# Patient Record
Sex: FEMALE | Race: WHITE | Employment: OTHER | URBAN - METROPOLITAN AREA
[De-identification: names, ages, dates, MRNs, and addresses within clinical notes are randomized per-mention and may not be internally consistent; named-entity substitution may affect disease eponyms.]

---

## 2018-02-09 ENCOUNTER — OFFICE VISIT (OUTPATIENT)
Dept: NEUROLOGY | Age: 78
End: 2018-02-09

## 2018-02-09 VITALS — BODY MASS INDEX: 28.49 KG/M2 | HEIGHT: 65 IN | RESPIRATION RATE: 20 BRPM | WEIGHT: 171 LBS

## 2018-02-09 DIAGNOSIS — R44.3 HALLUCINATIONS: ICD-10-CM

## 2018-02-09 DIAGNOSIS — F03.91 DEMENTIA WITH BEHAVIORAL DISTURBANCE, UNSPECIFIED DEMENTIA TYPE: Primary | ICD-10-CM

## 2018-02-09 RX ORDER — NITROGLYCERIN 80 MG/1
PATCH TRANSDERMAL
Refills: 0 | COMMUNITY
Start: 2018-02-06 | End: 2021-08-26

## 2018-02-09 RX ORDER — RANITIDINE 150 MG/1
TABLET, FILM COATED ORAL
Refills: 0 | COMMUNITY
Start: 2018-01-22 | End: 2021-08-26

## 2018-02-09 RX ORDER — CETIRIZINE HCL 10 MG
10 TABLET ORAL DAILY
COMMUNITY
End: 2021-09-11

## 2018-02-09 RX ORDER — QUETIAPINE FUMARATE 25 MG/1
TABLET, FILM COATED ORAL
Refills: 0 | COMMUNITY
Start: 2018-01-12 | End: 2018-02-09 | Stop reason: SDUPTHER

## 2018-02-09 RX ORDER — LEVOTHYROXINE SODIUM 50 UG/1
50 TABLET ORAL
Refills: 0 | COMMUNITY
Start: 2018-01-12

## 2018-02-09 RX ORDER — MEMANTINE HYDROCHLORIDE 10 MG/1
10 TABLET ORAL DAILY
Qty: 60 TAB | Refills: 5 | Status: SHIPPED | OUTPATIENT
Start: 2018-02-09 | End: 2021-08-26

## 2018-02-09 RX ORDER — GABAPENTIN 300 MG/1
CAPSULE ORAL
Refills: 0 | COMMUNITY
Start: 2018-01-20 | End: 2021-08-26

## 2018-02-09 RX ORDER — ERGOCALCIFEROL 1.25 MG/1
CAPSULE ORAL
COMMUNITY
End: 2021-08-26

## 2018-02-09 RX ORDER — LOSARTAN POTASSIUM 25 MG/1
TABLET ORAL
Refills: 0 | COMMUNITY
Start: 2017-12-30 | End: 2021-08-26

## 2018-02-09 RX ORDER — SODIUM BICARBONATE 325 MG/1
325 TABLET ORAL 2 TIMES DAILY
Refills: 0 | COMMUNITY
Start: 2018-01-20

## 2018-02-09 RX ORDER — QUETIAPINE FUMARATE 25 MG/1
50 TABLET, FILM COATED ORAL 2 TIMES DAILY
Qty: 120 TAB | Refills: 3 | Status: SHIPPED | OUTPATIENT
Start: 2018-02-09 | End: 2018-08-06 | Stop reason: SDUPTHER

## 2018-02-09 RX ORDER — ALPRAZOLAM 0.5 MG/1
TABLET ORAL
Refills: 0 | COMMUNITY
Start: 2018-01-16 | End: 2021-08-26

## 2018-02-09 NOTE — LETTER
Dear René Johnston MD, Thank you for allowing me to see your patient, Ania Rodriguez for a neurological consultation. Please see my impression and recommendations as outlined in my note. Sincerely, Wing Idania MD 
[facility] at [address] 
 
REFERRED BY: 
René Johnston MD 
 
CHIEF COMPLAINT: 
Dementia with hallucinations HISTORY OF PRESENT ILLNESS HISTORY PROVIDED BY: 
Patient Family Member: niece (pt lives with her) Tushar Peguero is a 65 y.o. female who I am asked to see in consultation for dementia with hallucinations. She has had memory issues since 2012 after she had a cardiac arrest. 
She lived in [facility] until recently. She has lived with family here for about 3 years ago. She did have an apartment on her own for some of the time but had family closely checking on her. She started to have issues with loud neighbors and the patient feeling like the neighbors were doing drugs. (niece reports this was not the caseparanoia on patient's part) Her long term memory is good. Her legs will get weak and her balance will be off and she will walk like she is drunk. This is not a constant event. She has no tremor. In the summer she had shingles and had to be hospitalized. She subsequently was home and was having hallucinations of seeing a man that wasn't there. This seemed to resolve, but then she had issues last night. Last night she was seeing a man on the couch when she got up to go the bathroom. This was obviously concerning to the patient. The patient does have a low-dose of Seroquel to take at night, but it does not seem to be helping. She had a significant cardiac event in 2012. She was on a ventilator during this time. She had a pacemaker placed. She went on HD. She is not on dialysis now. She has had memory issues since that time.   
Pt does confabulate details of her past.  
 She has never had memory medications or neuropsych. She had an episode 2 months ago with facial droop on the right that resolved. Family does cooking, cleaning, finances, and med management. She does her own ADLs. Patient is on Plavix due to her cardiac history Patient is taking gabapentin for neuropathy. [facility] Past Medical History:  
Diagnosis Date  Diabetes ([address]  Diverticulitis  Hypertension  Renal failure [address] Social History Social History  Marital status:  Spouse name: N/A  
 Number of children: N/A  
 Years of education: N/A Social History Main Topics  Smoking status: Never Smoker  Smokeless tobacco: Not on file  Alcohol use No  
 Drug use: Not on file  Sexual activity: Not on file Other Topics Concern  Not on file Social History Narrative  No narrative on file Keaton Wylie No significant family history ALLERGIES No Known Allergies CURRENT MEDS Current Outpatient Prescriptions Medication Sig Dispense Refill  gabapentin (NEURONTIN) 300 mg capsule Take one capsule by mouth in the morning and 2 capsules at night  0  
 levothyroxine (SYNTHROID) 50 mcg tablet Take one tablet by mouth in the am  0  
 losartan (COZAAR) 25 mg tablet take 1/2 tablet by mouth once daily  0  
 QUEtiapine (SEROQUEL) 25 mg tablet Take one tablet by mouth at HS  0  
 raNITIdine (ZANTAC) 150 mg tablet take 1 tablet by mouth at bedtime  0  
 sodium bicarbonate 325 mg tablet Take one tablet by mouth three times a day  0  
 ALPRAZolam (XANAX) 0.5 mg tablet take 1 tablet by mouth at bedtime if needed for 30 DAYS  0  
 BD INSULIN PEN NEEDLE UF SHORT 31 X 5/16 \" ndle   0  
 montelukast (SINGULAIR) 10 mg tablet   1  
 HUMALOG KWIKPEN 100 unit/mL kwikpen   4  
 LANTUS SOLOSTAR 100 unit/mL (3 mL) pen   4  
 sertraline (ZOLOFT) 50 mg tablet   0  
 spironolactone (ALDACTONE) 25 mg tablet   0  
 NOVOLOG FLEXPEN 100 unit/mL flexpen   0  
  clopidogrel (PLAVIX) 75 mg tablet   0  
 carvedilol (COREG) 12.5 mg tablet   0  
 amLODIPine (NORVASC) 2.5 mg tablet   0  
 aspirin 81 mg chewable tablet   0  
 atorvastatin (LIPITOR) 20 mg tablet   0  
 ONETOUCH ULTRA TEST strip   5  
 nitroglycerin (NITRODUR) 0.4 mg/hr apply 1 patch TO SKIN. REMOVE AFTER 24 HOURS ONCE A DAY  0  
 ofloxacin (FLOXIN) 0.3 % ophthalmic solution Administer 3 Drops to right eye four (4) times daily.  predniSONE (DELTASONE) 10 mg tablet Take 10 mg by mouth two (2) times a day.  cefUROXime (CEFTIN) 500 mg tablet Take 500 mg by mouth two (2) times a day.  NITROSTAT 0.4 mg SL tablet   0  
 hydrALAZINE (APRESOLINE) 50 mg tablet   0  
 fluticasone (FLONASE) 50 mcg/actuation nasal spray   0  
 amiodarone (CORDARONE) 200 mg tablet   0 REVIEW OF SYSTEMS:  
 
Y  N       Y  N  Y  N   Y  N 
  AIDS            Falls    Memory Loss     Shortness of breath Anxiety            Fatigue   Muscle Pain           Skipped beats Chest Pain     Frequent HA   Ms Weakness        Snoring Constipation  Hearing loss   Nause/Vomiting     Stomach Pain Cough           Hepatitis   Neuropathy            Swallowing difficulty Depression   Incontinence   Poor appetite         Vertigo Diarrhea         Joint Pain   Rash                      Visual disturbances Fainting          Leg Swelling   Ringing ears          Weight changes Unable to obtain  ROS due to  mental status change  sedated   intubated PREVIOUS WORKUP IMAGING: none LABS Results for orders placed or performed during the hospital encounter of 12/20/15 PROTHROMBIN TIME + INR Result Value Ref Range INR 1.1 0.9 - 1.1 Prothrombin time 10.4 9.0 - 11.1 sec PTT Result Value Ref Range aPTT 29.8 22.1 - 32.5 sec  
 aPTT, therapeutic range     58.0 - 77.0 SECS  
 
 
PHYSICAL EXAM 
Visit Vitals  Resp 20  
 Ht 5' 5\" (1.651 m)  Wt 77.6 kg (171 lb)  BMI 28.46 kg/m2 General:  Alert, cooperative, no distress. Head:  Normocephalic, without obvious abnormality, atraumatic. Eyes:  Conjunctivae/corneas clear. Pupils equal, round, reactive to light. Extraocular movements intact, VFF, NO papilledema Lungs: 
Heart:   Non labored breathing Regular rate and rhythm, no carotid bruits Abdomen:   Soft, non-distended Extremities: Extremities normal, atraumatic, no cyanosis or edema. Pulses: 2+ and symmetric all extremities. Skin: Skin color, texture, turgor normal. No rashes or lesions. Neurologic:  Gen: Attention normal 
           Language: naming, repetition, fluency normal 
           Memory: A&Ox3, 3/3 word recall Cranial Nerves: 
I: smell Not tested II: visual fields Full to confrontation II: pupils Equal, round, reactive to light II: optic disc No papilledema III,VII: ptosis none III,IV,VI: extraocular muscles  Full ROM V: mastication normal  
V: facial light touch sensation  normal  
VII: facial muscle function   symmetric VIII: hearing symmetric IX: soft palate elevation  normal  
XI: trapezius strength  5/5 XI: sternocleidomastoid strength 5/5 XI: neck flexion strength  5/5 XII: tongue  midline Motor: normal bulk and tone, no tremor Strength: moves all four extremities equally Sensory: intact to LT, PP, vibration, and temperature Coordination: FTN intact Gait: wide based Reflexes: 1+ throughout IMPRESSION Aydin Marie is a 65 y.o. female who presents for evaluation of dementia and hallucinations. She has never been formally evaluated for her dementia and is not having any medication for this. Additionally hallucinations are likely secondary to her dementia. She has had issues with vivid dreams so will not do Aricept. Will start Namenda. Will have to monitor closely to ensure hallucinations do not progress. We will also adjust Seroquel dosing to see if this will help patient with her hallucinations. RECOMMENDATIONS 1. CT head 2. Reversible memory labs completed by PCP and negative 3. We will check EEG 4.  Start Namenda 5 mg twice daily and titrate to 10 mg twice daily. Side effects discussed. Information given to family 5. We will hold off on Aricept due to history of vivid dreams 6. Will increase Seroquel up to 50 mg twice daily. Schedule given to family. Side effects discussed. 7.  Will do referral for neuropsychological testing. Family would like evaluation and help with safety factors, etc. 
 
FU 3 months Manuel Espinal MD 
 
CC: Serg Cuellar MD 
Fax: [phone] This note was created using voice recognition software. Despite editing, there may be syntax errors. This note will not be viewable in [address] Reviewed record in preparation for visit and have necessary documentation Pt did not bring medication to office visit for review Medication list reviewed and reconciled with patient Information was given to pt on Advanced Directives, Living Will 
opportunity was given for questions

## 2018-02-09 NOTE — MR AVS SNAPSHOT
[facility] 
 
 
 [place] [address] [address] [phone] Patient: Aisha Sanders MRN: [id number] :1940 Visit Information Date & Time Provider Department Dept. Phone Encounter #  
 2018  1:00 PM Doe Goode MD [facility] [phone] [id number] Your Appointments 2018  1:40 PM  
Follow Up with Doe Goode MD  
[facility]) Appt Note: memory loss [place]  [address] [address] [phone]  
  
   
 [place]  [address] [address] [address] Upcoming Health Maintenance Date Due DTaP/Tdap/Td series (1 - Tdap) 10/31/1960 ZOSTER VACCINE AGE 60> 1999 GLAUCOMA SCREENING Q2Y 10/31/2004 OSTEOPOROSIS SCREENING (DEXA) 10/31/2004 Pneumococcal 65+ Low/Medium Risk (1 of 2 - PCV13) 10/31/2004 MEDICARE YEARLY EXAM 10/31/2004 Influenza Age 5 to Adult 2017 Allergies as of 2018  Review Complete On: 2015 By: Inessa Read RN No Known Allergies Current Immunizations  Never Reviewed No immunizations on file. Not reviewed this visit You Were Diagnosed With   
  
 Codes Comments Dementia with behavioral disturbance, unspecified dementia type    -  Primary ICD-10-CM: F03.91 
ICD-9-CM: 294.21 Hallucinations     ICD-10-CM: R44.3 ICD-9-CM: 780.1 Vitals Resp Height(growth percentile) Weight(growth percentile) BMI OB Status Smoking Status 20 5' 5\" (1.651 m) 171 lb (77.6 kg) 28.46 kg/m2 Hysterectomy Never Smoker BMI and BSA Data Body Mass Index Body Surface Area  
 28.46 kg/m 2 1.89 m 2 Preferred Pharmacy Pharmacy Name Phone [place], [address] [place] [address] [place] [phone] Your Updated Medication List  
  
   
 This list is accurate as of: 2/9/18  1:50 PM.  Always use your most recent med list.  
  
  
  
  
 ALPRAZolam 0.5 mg tablet Commonly known as:  XANAX  
take 1 tablet by mouth at bedtime if needed for 30 DAYS  
  
 amLODIPine 2.5 mg tablet Commonly known as:  NORVASC  
  
 aspirin 81 mg chewable tablet  
  
 atorvastatin 20 mg tablet Commonly known as:  LIPITOR  
  
 BD INSULIN PEN NEEDLE UF SHORT 31 gauge x 5/16\" Ndle Generic drug:  Insulin Needles (Disposable)  
  
 carvedilol 12.5 mg tablet Commonly known as:  COREG  
  
 cetirizine 5 mg tablet Commonly known as:  ZYRTEC Take 5 mg by mouth daily. clopidogrel 75 mg Tab Commonly known as:  PLAVIX  
  
 ergocalciferol 50,000 unit capsule Commonly known as:  ERGOCALCIFEROL Take 50,000 Units by mouth every 7 days  
  
 gabapentin 300 mg capsule Commonly known as:  NEURONTIN Take one capsule by mouth in the morning and 2 capsules at night HumaLOG KwikPen 100 unit/mL kwikpen Generic drug:  insulin lispro  
  
 hydrALAZINE 50 mg tablet Commonly known as:  APRESOLINE  
  
 LANTUS SOLOSTAR 100 unit/mL (3 mL) Inpn Generic drug:  insulin glargine  
  
 levothyroxine 50 mcg tablet Commonly known as:  SYNTHROID Take one tablet by mouth in the am  
  
 losartan 25 mg tablet Commonly known as:  COZAAR  
take 1/2 tablet by mouth once daily  
  
 memantine 10 mg tablet Commonly known as:  Nabila Anis Take 1 Tab by mouth daily. montelukast 10 mg tablet Commonly known as:  SINGULAIR  
  
 * NITROSTAT 0.4 mg SL tablet Generic drug:  nitroglycerin * nitroglycerin 0.4 mg/hr Commonly known as:  NITRODUR  
apply 1 patch TO SKIN. REMOVE AFTER 24 HOURS ONCE A DAY NovoLOG Flexpen 100 unit/mL Inpn Generic drug:  insulin aspart ONETOUCH ULTRA TEST strip Generic drug:  glucose blood VI test strips QUEtiapine 25 mg tablet Commonly known as:  SEROquel Take 2 Tabs by mouth two (2) times a day. raNITIdine 150 mg tablet Commonly known as:  ZANTAC  
take 1 tablet by mouth at bedtime  
  
 sertraline 50 mg tablet Commonly known as:  ZOLOFT  
  
 sodium bicarbonate 325 mg tablet Take one tablet by mouth three times a day  
  
 spironolactone 25 mg tablet Commonly known as:  ALDACTONE  
  
 * Notice: This list has 2 medication(s) that are the same as other medications prescribed for you. Read the directions carefully, and ask your doctor or other care provider to review them with you. Prescriptions Sent to Pharmacy Refills  
 memantine (NAMENDA) 10 mg tablet 5 Sig: Take 1 Tab by mouth daily. Class: Normal  
 Pharmacy: [address] #: [phone] Route: Oral  
 QUEtiapine (SEROQUEL) 25 mg tablet 3 Sig: Take 2 Tabs by mouth two (2) times a day. Class: Normal  
 Pharmacy: [address] #: [phone] Route: Oral  
  
To-Do List   
 02/10/2018 Imaging:  CT HEAD WO CONT   
  
 02/10/2018 Neurology:  EEG Patient Instructions Jesse Ling 17[address] What is a living will? A living will is a legal form you use to write down the kind of care you want at the end of your life. It is used by the health professionals who will treat you if you aren't able to decide for yourself. If you put your wishes in writing, your loved ones and others will know what kind of care you want. They won't need to guess. This can ease your mind and be helpful to others. A living will is not the same as an estate or property will. An estate will explains what you want to happen with your money and property after you die. Is a living will a legal document? A living will is a legal document. Each state has its own laws about living frankel.  If you move to another state, make sure that your living will is legal in the state where you now live. Or you might use a universal form that has been approved by many states. This kind of form can sometimes be completed and stored online. Your electronic copy will then be available wherever you have a connection to the Internet. In most cases, doctors will respect your wishes even if you have a form from a different state. · You don't need an  to complete a living will. But legal advice can be helpful if your state's laws are unclear, your health history is complicated, or your family can't agree on what should be in your living will. · You can change your living will at any time. Some people find that their wishes about end-of-life care change as their health changes. · In addition to making a living will, think about completing a medical power of  form. This form lets you name the person you want to make end-of-life treatment decisions for you (your \"health care agent\") if you're not able to. Many hospitals and nursing homes will give you the forms you need to complete a living will and a medical power of . · Your living will is used only if you can't make or communicate decisions for yourself anymore. If you become able to make decisions again, you can accept or refuse any treatment, no matter what you wrote in your living will. · Your state may offer an online registry. This is a place where you can store your living will online so the doctors and nurses who need to treat you can find it right away. What should you think about when creating a living will? Talk about your end-of-life wishes with your family members and your doctor. Let them know what you want. That way the people making decisions for you won't be surprised by your choices. Think about these questions as you make your living will: · Do you know enough about life support methods that might be used?  If not, talk to your doctor so you know what might be done if you can't breathe on your own, your heart stops, or you're unable to swallow. · What things would you still want to be able to do after you receive life-support methods? Would you want to be able to walk? To speak? To eat on your own? To live without the help of machines? · If you have a choice, where do you want to be cared for? In your home? At a hospital or nursing home? · Do you want certain Alevism practices performed if you become very ill? · If you have a choice at the end of your life, where would you prefer to die? At home? In a hospital or nursing home? Somewhere else? · Would you prefer to be buried or cremated? · Do you want your organs to be donated after you die? What should you do with your living will? · Make sure that your family members and your health care agent have copies of your living will. · Give your doctor a copy of your living will to keep in your medical record. If you have more than one doctor, make sure that each one has a copy. · You may want to put a copy of your living will where it can be easily found. Where can you learn more? Go to http://carlos-summer.info/. Enter H037 in the search box to learn more about \"Learning About Living Perroy. \" Current as of: September 24, 2016 Content Version: 11.4 © 1551-2993 [employer]. Care instructions adapted under license by [employer] (which disclaims liability or warranty for this information). If you have questions about a medical condition or this instruction, always ask your healthcare professional. [place] [address] any warranty or liability for your use of this information. Advance Directives: Care Instructions Your Care Instructions An advance directive is a legal way to state your wishes at the end of your life. It tells your family and your doctor what to do if you can no longer say what you want. There are two main types of advance directives. You can change them any time that your wishes change. · A living will tells your family and your doctor your wishes about life support and other treatment. · A durable power of  for health care lets you name a person to make treatment decisions for you when you can't speak for yourself. This person is called a health care agent. If you do not have an advance directive, decisions about your medical care may be made by a doctor or a  who doesn't know you. It may help to think of an advance directive as a gift to the people who care for you. If you have one, they won't have to make tough decisions by themselves. Follow-up care is a key part of your treatment and safety. Be sure to make and go to all appointments, and call your doctor if you are having problems. It's also a good idea to know your test results and keep a list of the medicines you take. How can you care for yourself at home? · Discuss your wishes with your loved ones and your doctor. This way, there are no surprises. · Many states have a unique form. Or you might use a universal form that has been approved by many states. This kind of form can sometimes be completed and stored online. Your electronic copy will then be available wherever you have a connection to the Internet. In most cases, doctors will respect your wishes even if you have a form from a different state. · You don't need a  to do an advance directive. But you may want to get legal advice. · Think about these questions when you prepare an advance directive: ¨ Who do you want to make decisions about your medical care if you are not able to? Many people choose a family member or close friend. ¨ Do you know enough about life support methods that might be used? If not, talk to your doctor so you understand. ¨ What are you most afraid of that might happen?  You might be afraid of having pain, losing your independence, or being kept alive by machines. ¨ Where would you prefer to die? Choices include your home, a hospital, or a nursing home. ¨ Would you like to have information about hospice care to support you and your family? ¨ Do you want to donate organs when you die? ¨ Do you want certain Mandaen practices performed before you die? If so, put your wishes in the advance directive. · Read your advance directive every year, and make changes as needed. When should you call for help? Be sure to contact your doctor if you have any questions. Where can you learn more? Go to http://carlos-summer.info/. Enter R264 in the search box to learn more about \"Advance Directives: Care Instructions. \" Current as of: September 24, 2016 Content Version: 11.4 © 0086-0947 [employer]. Care instructions adapted under license by [employer] (which disclaims liability or warranty for this information). If you have questions about a medical condition or this instruction, always ask your healthcare professional. [place] [address] any warranty or liability for your use of this information. PRESCRIPTION REFILL POLICY [facility] Statement to Patients April 1, 2014 In an effort to ensure the large volume of patient prescription refills is processed in the most efficient and expeditious manner, we are asking our patients to assist us by calling your Pharmacy for all prescription refills, this will include also your  Mail Order Pharmacy. The pharmacy will contact our office electronically to continue the refill process. Please do not wait until the last minute to call your pharmacy. We need at least 48 hours (2days) to fill prescriptions. We also encourage you to call your pharmacy before going to  your prescription to make sure it is ready. With regard to controlled substance prescription refill requests (narcotic refills) that need to be picked up at our office, we ask your cooperation by providing us with at least 72 hours (3days) notice that you will need a refill. We will not refill narcotic prescription refill requests after 4:00pm on any weekday, Monday through Thursday, or after 2:00pm on Fridays, or on the weekends. We encourage everyone to explore another way of getting your prescription refill request processed using Gaopeng, our patient web portal through our electronic medical record system. Gaopeng is an efficient and effective way to communicate your medication request directly to the office and  downloadable as an duglas on your smart phone . [employer] also features a review functionality that allows you to view your medication list as well as leave messages for your physician. Are you ready to get connected? If so please review the attatched instructions or speak to any of our staff to get you set up right away! Thank you so much for your cooperation. Should you have any questions please contact our Practice Administrator. The Physicians and Staff,  [facility] Patient Instructions/Plans: For the Quetiapine 25 mg tabs: Take 1 tablet twice a day for 1 week then If needed take 1 tablet in a.m. and 2 tablets at night for 1 week then if needed Take 2 tablets twice a day For Namenda 10 mg tablets: Take one half tab twice a day for 2 weeks then Take 1 tablet twice a day Memantine (By mouth) Memantine (me-MAN-teen) Treats dementia associated with Alzheimer disease. Brand Name(s): Namenda, Namenda Titration Pack, Namenda XR, Namenda XR Titration Pack There may be other brand names for this medicine. When This Medicine Should Not Be Used: You should not use this medicine if you have had an allergic reaction to memantine. How to Use This Medicine:  
Long Acting Capsule, Liquid, Tablet · Take your medicine as directed. Your dose may need to be changed several times to find what works best for you. Most people need to wait at least one week between dose changes. · You may take this medicine with or without food. · Measure the oral liquid medicine with a marked measuring spoon, oral syringe, or medicine cup. · Swallow the extended-release capsule whole. Do not crush, break, or chew it. · If you cannot swallow the extended-release capsule, you may open it and pour the medicine into a small amount of soft food such as pudding, yogurt, or applesauce. Stir this mixture well and swallow it without chewing. · For the liquid and extended-release capsule form: Read and follow the patient instructions that come with this medicine. Talk to your doctor or pharmacist if you have any questions. If a dose is missed: · Take a dose as soon as you remember. If it is almost time for your next dose, wait until then and take a regular dose. Do not take extra medicine to make up for a missed dose. How to Store and Dispose of This Medicine: · Store the medicine in a closed container at room temperature, away from heat, moisture, and direct light. · Ask your pharmacist, doctor, or health caregiver about the best way to dispose of any outdated medicine or medicine no longer needed. · Keep all medicine out of the reach of children. Never share your medicine with anyone. Drugs and Foods to Avoid: Ask your doctor or pharmacist before using any other medicine, including over-the-counter medicines, vitamins, and herbal products. · Make sure your doctor knows if you are also using amantadine (Symmetrel®), cimetidine (Tagamet®), ketamine (Kristene Caldron), metformin (Glucophage®), quinidine (Cardioquin®, Alma Rosa Back), or ranitidine (Zantac®).  
· Tell your doctor if you are also using a diuretic or \"water pill\" (such as acetazolamide, hydrochlorothiazide [HCTZ], methazolamide, triamterene, Diamox®, Dyazide®, Dyrenium®, Maxzide®, or Neptazane®) or an antacid or laxative that contains sodium bicarbonate, baking soda, or bicarbonate of soda (such as Loretta-Laura®). · Tell your doctor if you smoke or if you are using a stop-smoking aid that contains nicotine (such as Nicoderm®, Nicorette®, or Nicotrol®) or a cold or cough medicine that contains dextromethorphan (DayQuil®, NyQuil®, Robitussin® DM, or TheraFlu®). Warnings While Using This Medicine: · Make sure your doctor knows if you are pregnant or breastfeeding, or if you have kidney disease, liver disease, bladder problems or difficulty with urination, or epilepsy or seizures. · Check with your doctor right away if you get a urinary tract infection. This includes any infection in your bladder or kidneys. Your dose of this medicine might need to be changed while you have an infection. · Your doctor will check your progress and the effects of this medicine at regular visits. Keep all appointments. Possible Side Effects While Using This Medicine:  
Call your doctor right away if you notice any of these side effects: · Allergic reaction: Itching or hives, swelling in your face or hands, swelling or tingling in your mouth or throat, chest tightness, trouble breathing · Change in how much or how often you urinate. · Chest pain. · Lightheadedness, dizziness, or fainting. · Seeing or hearing things that are not there. · Severe sleepiness, restlessness, or confusion. · Sudden or severe headache. If you notice these less serious side effects, talk with your doctor: · Back pain. · Constipation, diarrhea, vomiting, or stomach pain. · Feeling aggressive or depressed. · Mild headache. · Tiredness or weakness. · Weight gain. If you notice other side effects that you think are caused by this medicine, tell your doctor. Call your doctor for medical advice about side effects. You may report side effects to FDA at 4-193-FDA-9595 © 2017 [facility] Information is for End User's use only and may not be sold, redistributed or otherwise used for commercial purposes. The above information is an  only. It is not intended as medical advice for individual conditions or treatments. Talk to your doctor, nurse or pharmacist before following any medical regimen to see if it is safe and effective for you. Introducing [facility]! [employer] introduces BackType patient portal. Now you can access parts of your medical record, email your doctor's office, and request medication refills online. 1. In your internet browser, go to [email or web address]://[employer]. [employer]Page [employer] 2. Click on the First Time User? Click Here link in the Sign In box. You will see the New Member Sign Up page. 3. Enter your BackType Access Code exactly as it appears below. You will not need to use this code after youve completed the sign-up process. If you do not sign up before the expiration date, you must request a new code. · BackType Access Code: [email or web address] Expires: 5/10/2018 12:26 PM 
 
4. Enter the last four digits of your Social Security Number (xxxx) and Date of Birth (mm/dd/yyyy) as indicated and click Submit. You will be taken to the next sign-up page. 5. Create a BackType ID. This will be your BackType login ID and cannot be changed, so think of one that is secure and easy to remember. 6. Create a BackType password. You can change your password at any time. 7. Enter your Password Reset Question and Answer. This can be used at a later time if you forget your password. 8. Enter your e-mail address. You will receive e-mail notification when new information is available in [address] 9. Click Sign Up. You can now view and download portions of your medical record. 10. Click the Download Summary menu link to download a portable copy of your medical information. If you have questions, please visit the Frequently Asked Questions section of the [employer] website. Remember, WiFi Rail is NOT to be used for urgent needs. For medical emergencies, dial 911. Now available from your iPhone and Android! Please provide this summary of care documentation to your next provider. Your primary care clinician is listed as Jacque Ram. If you have any questions after today's visit, please call [phone].

## 2018-02-09 NOTE — PATIENT INSTRUCTIONS
Learning About Living Leslie  What is a living will? A living will is a legal form you use to write down the kind of care you want at the end of your life. It is used by the health professionals who will treat you if you aren't able to decide for yourself. If you put your wishes in writing, your loved ones and others will know what kind of care you want. They won't need to guess. This can ease your mind and be helpful to others. A living will is not the same as an estate or property will. An estate will explains what you want to happen with your money and property after you die. Is a living will a legal document? A living will is a legal document. Each state has its own laws about living frankel. If you move to another state, make sure that your living will is legal in the state where you now live. Or you might use a universal form that has been approved by many states. This kind of form can sometimes be completed and stored online. Your electronic copy will then be available wherever you have a connection to the Internet. In most cases, doctors will respect your wishes even if you have a form from a different state. · You don't need an  to complete a living will. But legal advice can be helpful if your state's laws are unclear, your health history is complicated, or your family can't agree on what should be in your living will. · You can change your living will at any time. Some people find that their wishes about end-of-life care change as their health changes. · In addition to making a living will, think about completing a medical power of  form. This form lets you name the person you want to make end-of-life treatment decisions for you (your \"health care agent\") if you're not able to. Many hospitals and nursing homes will give you the forms you need to complete a living will and a medical power of .   · Your living will is used only if you can't make or communicate decisions for yourself anymore. If you become able to make decisions again, you can accept or refuse any treatment, no matter what you wrote in your living will. · Your state may offer an online registry. This is a place where you can store your living will online so the doctors and nurses who need to treat you can find it right away. What should you think about when creating a living will? Talk about your end-of-life wishes with your family members and your doctor. Let them know what you want. That way the people making decisions for you won't be surprised by your choices. Think about these questions as you make your living will:  · Do you know enough about life support methods that might be used? If not, talk to your doctor so you know what might be done if you can't breathe on your own, your heart stops, or you're unable to swallow. · What things would you still want to be able to do after you receive life-support methods? Would you want to be able to walk? To speak? To eat on your own? To live without the help of machines? · If you have a choice, where do you want to be cared for? In your home? At a hospital or nursing home? · Do you want certain Episcopal practices performed if you become very ill? · If you have a choice at the end of your life, where would you prefer to die? At home? In a hospital or nursing home? Somewhere else? · Would you prefer to be buried or cremated? · Do you want your organs to be donated after you die? What should you do with your living will? · Make sure that your family members and your health care agent have copies of your living will. · Give your doctor a copy of your living will to keep in your medical record. If you have more than one doctor, make sure that each one has a copy. · You may want to put a copy of your living will where it can be easily found. Where can you learn more? Go to http://carlos-summer.info/.   Enter Q941 in the search box to learn more about \"Learning About Living Leslie. \"  Current as of: September 24, 2016  Content Version: 11.4  © 9112-5017 [employer]. Care instructions adapted under license by [employer] (which disclaims liability or warranty for this information). If you have questions about a medical condition or this instruction, always ask your healthcare professional. [address] any warranty or liability for your use of this information. Advance Directives: Care Instructions  Your Care Instructions  An advance directive is a legal way to state your wishes at the end of your life. It tells your family and your doctor what to do if you can no longer say what you want. There are two main types of advance directives. You can change them any time that your wishes change. · A living will tells your family and your doctor your wishes about life support and other treatment. · A durable power of  for health care lets you name a person to make treatment decisions for you when you can't speak for yourself. This person is called a health care agent. If you do not have an advance directive, decisions about your medical care may be made by a doctor or a  who doesn't know you. It may help to think of an advance directive as a gift to the people who care for you. If you have one, they won't have to make tough decisions by themselves. Follow-up care is a key part of your treatment and safety. Be sure to make and go to all appointments, and call your doctor if you are having problems. It's also a good idea to know your test results and keep a list of the medicines you take. How can you care for yourself at home? · Discuss your wishes with your loved ones and your doctor. This way, there are no surprises. · Many states have a unique form. Or you might use a universal form that has been approved by many states. This kind of form can sometimes be completed and stored online.  Your electronic copy will then be available wherever you have a connection to the Internet. In most cases, doctors will respect your wishes even if you have a form from a different state. · You don't need a  to do an advance directive. But you may want to get legal advice. · Think about these questions when you prepare an advance directive:  ¨ Who do you want to make decisions about your medical care if you are not able to? Many people choose a family member or close friend. ¨ Do you know enough about life support methods that might be used? If not, talk to your doctor so you understand. ¨ What are you most afraid of that might happen? You might be afraid of having pain, losing your independence, or being kept alive by machines. ¨ Where would you prefer to die? Choices include your home, a hospital, or a nursing home. ¨ Would you like to have information about hospice care to support you and your family? ¨ Do you want to donate organs when you die? ¨ Do you want certain Baptist practices performed before you die? If so, put your wishes in the advance directive. · Read your advance directive every year, and make changes as needed. When should you call for help? Be sure to contact your doctor if you have any questions. Where can you learn more? Go to http://carlos-summer.info/. Enter R264 in the search box to learn more about \"Advance Directives: Care Instructions. \"  Current as of: September 24, 2016  Content Version: 11.4  © 0506-0379 [employer]. Care instructions adapted under license by [employer] (which disclaims liability or warranty for this information). If you have questions about a medical condition or this instruction, always ask your healthcare professional. [place] [address] any warranty or liability for your use of this information.   [facility]   Statement to Patients  April 1, 2014      In an effort to ensure the large volume of patient prescription refills is processed in the most efficient and expeditious manner, we are asking our patients to assist us by calling your Pharmacy for all prescription refills, this will include also your  Mail Order Pharmacy. The pharmacy will contact our office electronically to continue the refill process. Please do not wait until the last minute to call your pharmacy. We need at least 48 hours (2days) to fill prescriptions. We also encourage you to call your pharmacy before going to  your prescription to make sure it is ready. With regard to controlled substance prescription refill requests (narcotic refills) that need to be picked up at our office, we ask your cooperation by providing us with at least 72 hours (3days) notice that you will need a refill. We will not refill narcotic prescription refill requests after 4:00pm on any weekday, Monday through Thursday, or after 2:00pm on Fridays, or on the weekends. We encourage everyone to explore another way of getting your prescription refill request processed using Gecko, our patient web portal through our electronic medical record system. Gecko is an efficient and effective way to communicate your medication request directly to the office and  downloadable as an duglas on your smart phone . Gecko also features a review functionality that allows you to view your medication list as well as leave messages for your physician. Are you ready to get connected? If so please review the attatched instructions or speak to any of our staff to get you set up right away! Thank you so much for your cooperation. Should you have any questions please contact our Practice Administrator. The Physicians and Staff,  [facility]   Patient Instructions/Plans: For the Quetiapine 25 mg tabs:   Take 1 tablet twice a day for 1 week then  If needed take 1 tablet in a.m. and 2 tablets at night for 1 week then if needed  Take 2 tablets twice a day     For Namenda 10 mg tablets: Take one half tab twice a day for 2 weeks then  Take 1 tablet twice a day    Memantine (By mouth)   Memantine (me-MAN-teen)  Treats dementia associated with Alzheimer disease. Brand Name(s): Namenda, Namenda Titration Pack, Namenda XR, Namenda XR Titration Pack   There may be other brand names for this medicine. When This Medicine Should Not Be Used: You should not use this medicine if you have had an allergic reaction to memantine. How to Use This Medicine:   Long Acting Capsule, Liquid, Tablet  · Take your medicine as directed. Your dose may need to be changed several times to find what works best for you. Most people need to wait at least one week between dose changes. · You may take this medicine with or without food. · Measure the oral liquid medicine with a marked measuring spoon, oral syringe, or medicine cup. · Swallow the extended-release capsule whole. Do not crush, break, or chew it. · If you cannot swallow the extended-release capsule, you may open it and pour the medicine into a small amount of soft food such as pudding, yogurt, or applesauce. Stir this mixture well and swallow it without chewing. · For the liquid and extended-release capsule form: Read and follow the patient instructions that come with this medicine. Talk to your doctor or pharmacist if you have any questions. If a dose is missed:   · Take a dose as soon as you remember. If it is almost time for your next dose, wait until then and take a regular dose. Do not take extra medicine to make up for a missed dose. How to Store and Dispose of This Medicine:   · Store the medicine in a closed container at room temperature, away from heat, moisture, and direct light. · Ask your pharmacist, doctor, or health caregiver about the best way to dispose of any outdated medicine or medicine no longer needed.   · Keep all medicine out of the reach of children. Never share your medicine with anyone. Drugs and Foods to Avoid:   Ask your doctor or pharmacist before using any other medicine, including over-the-counter medicines, vitamins, and herbal products. · Make sure your doctor knows if you are also using amantadine (Symmetrel®), cimetidine (Tagamet®), ketamine (Martinez Sabal), metformin (Glucophage®), quinidine (Cardioquin®, Rodell Chavez), or ranitidine (Zantac®). · Tell your doctor if you are also using a diuretic or \"water pill\" (such as acetazolamide, hydrochlorothiazide [HCTZ], methazolamide, triamterene, Diamox®, Dyazide®, Dyrenium®, Maxzide®, or Neptazane®) or an antacid or laxative that contains sodium bicarbonate, baking soda, or bicarbonate of soda (such as Loretta-Winchester®). · Tell your doctor if you smoke or if you are using a stop-smoking aid that contains nicotine (such as Nicoderm®, Nicorette®, or Nicotrol®) or a cold or cough medicine that contains dextromethorphan (DayQuil®, NyQuil®, Robitussin® DM, or TheraFlu®). Warnings While Using This Medicine:   · Make sure your doctor knows if you are pregnant or breastfeeding, or if you have kidney disease, liver disease, bladder problems or difficulty with urination, or epilepsy or seizures. · Check with your doctor right away if you get a urinary tract infection. This includes any infection in your bladder or kidneys. Your dose of this medicine might need to be changed while you have an infection. · Your doctor will check your progress and the effects of this medicine at regular visits. Keep all appointments. Possible Side Effects While Using This Medicine:   Call your doctor right away if you notice any of these side effects:  · Allergic reaction: Itching or hives, swelling in your face or hands, swelling or tingling in your mouth or throat, chest tightness, trouble breathing  · Change in how much or how often you urinate. · Chest pain. · Lightheadedness, dizziness, or fainting.   · Seeing or hearing things that are not there. · Severe sleepiness, restlessness, or confusion. · Sudden or severe headache. If you notice these less serious side effects, talk with your doctor:   · Back pain. · Constipation, diarrhea, vomiting, or stomach pain. · Feeling aggressive or depressed. · Mild headache. · Tiredness or weakness. · Weight gain. If you notice other side effects that you think are caused by this medicine, tell your doctor. Call your doctor for medical advice about side effects. You may report side effects to FDA at 7-281-LMI-0582  © 2017 [facility] Information is for End User's use only and may not be sold, redistributed or otherwise used for commercial purposes. The above information is an  only. It is not intended as medical advice for individual conditions or treatments. Talk to your doctor, nurse or pharmacist before following any medical regimen to see if it is safe and effective for you.

## 2018-02-09 NOTE — PROGRESS NOTES
NEUROLOGY NEW PATIENT CONSULTATION    REFERRED BY:  Rudolph Gaspar MD    CHIEF COMPLAINT:  Dementia with hallucinations    HISTORY OF PRESENT ILLNESS    HISTORY PROVIDED BY:  Patient  Family Member: niece (pt lives with her)      Martha Valente is a 65 y.o. female who I am asked to see in consultation for dementia with hallucinations. She has had memory issues since 2012 after she had a cardiac arrest.  She lived in [address] [place] [address] until recently. She has lived with family here for about 3 years ago. She did have an apartment on her own for some of the time but had family closely checking on her. She started to have issues with loud neighbors and the patient feeling like the neighbors were doing drugs. (niece reports this was not the caseparanoia on patient's part)  Her long term memory is good. Her legs will get weak and her balance will be off and she will walk like she is drunk. This is not a constant event. She has no tremor. In the summer she had shingles and had to be hospitalized. She subsequently was home and was having hallucinations of seeing a man that wasn't there. This seemed to resolve, but then she had issues last night. Last night she was seeing a man on the couch when she got up to go the bathroom. This was obviously concerning to the patient. The patient does have a low-dose of Seroquel to take at night, but it does not seem to be helping. She had a significant cardiac event in 2012. She was on a ventilator during this time. She had a pacemaker placed. She went on HD. She is not on dialysis now. She has had memory issues since that time. Pt does confabulate details of her past.   She has never had memory medications or neuropsych. She had an episode 2 months ago with facial droop on the right that resolved. Family does cooking, cleaning, finances, and med management. She does her own ADLs.   Patient is on Plavix due to her cardiac history  Patient is taking gabapentin for neuropathy. [facility]  Past Medical History:   Diagnosis Date    Diabetes ([address]     Diverticulitis     Hypertension     Renal failure        SH  Social History     Social History    Marital status:      Spouse name: N/A    Number of children: N/A    Years of education: N/A     Social History Main Topics    Smoking status: Never Smoker    Smokeless tobacco: Not on file    Alcohol use No    Drug use: Not on file    Sexual activity: Not on file     Other Topics Concern    Not on file     Social History Narrative    No narrative on file       FH  No significant family history    ALLERGIES  No Known Allergies    CURRENT MEDS  Current Outpatient Prescriptions   Medication Sig Dispense Refill    gabapentin (NEURONTIN) 300 mg capsule Take one capsule by mouth in the morning and 2 capsules at night  0    levothyroxine (SYNTHROID) 50 mcg tablet Take one tablet by mouth in the am  0    losartan (COZAAR) 25 mg tablet take 1/2 tablet by mouth once daily  0    QUEtiapine (SEROQUEL) 25 mg tablet Take one tablet by mouth at HS  0    raNITIdine (ZANTAC) 150 mg tablet take 1 tablet by mouth at bedtime  0    sodium bicarbonate 325 mg tablet Take one tablet by mouth three times a day  0    ALPRAZolam (XANAX) 0.5 mg tablet take 1 tablet by mouth at bedtime if needed for 30 DAYS  0    BD INSULIN PEN NEEDLE UF SHORT 31 X 5/16 \" ndle   0    montelukast (SINGULAIR) 10 mg tablet   1    HUMALOG KWIKPEN 100 unit/mL kwikpen   4    LANTUS SOLOSTAR 100 unit/mL (3 mL) pen   4    sertraline (ZOLOFT) 50 mg tablet   0    spironolactone (ALDACTONE) 25 mg tablet   0    NOVOLOG FLEXPEN 100 unit/mL flexpen   0    clopidogrel (PLAVIX) 75 mg tablet   0    carvedilol (COREG) 12.5 mg tablet   0    amLODIPine (NORVASC) 2.5 mg tablet   0    aspirin 81 mg chewable tablet   0    atorvastatin (LIPITOR) 20 mg tablet   0    ONETOUCH ULTRA TEST strip   5    nitroglycerin (NITRODUR) 0.4 mg/hr apply 1 patch TO SKIN.  REMOVE AFTER 24 HOURS ONCE A DAY  0    ofloxacin (FLOXIN) 0.3 % ophthalmic solution Administer 3 Drops to right eye four (4) times daily.  predniSONE (DELTASONE) 10 mg tablet Take 10 mg by mouth two (2) times a day.  cefUROXime (CEFTIN) 500 mg tablet Take 500 mg by mouth two (2) times a day.  NITROSTAT 0.4 mg SL tablet   0    hydrALAZINE (APRESOLINE) 50 mg tablet   0    fluticasone (FLONASE) 50 mcg/actuation nasal spray   0    amiodarone (CORDARONE) 200 mg tablet   0       REVIEW OF SYSTEMS:     Y  N       Y  N  Y  N   Y  N  [] [x] AIDS          [x] [] Falls  [x] [] Memory Loss  [x] []  Shortness of breath  [x] [] Anxiety          [x] [] Fatigue [x] [] Muscle Pain        [] [x]  Skipped beats  [x] [] Chest Pain   [x] [] Frequent HA [x] [] Ms Weakness     [x] []  Snoring  [] [x] Constipation [x] []Hearing loss [] [x] Nause/Vomiting  [] [x]  Stomach Pain  [x] [x] Cough          [] [x]Hepatitis [x] [x] Neuropathy         [] [x]  Swallowing difficulty  [x] [] Depression  [x] []Incontinence [x] [] Poor appetite      [x] []  Vertigo  [x] [] Diarrhea       [x] [] Joint Pain [] [x] Rash                   [] [x]  Visual disturbances  [x] [] Fainting        [] [x] Leg Swelling [x] [] Ringing ears       [] [x]  Weight changes      []Unable to obtain  ROS due to  []mental status change  []sedated   []intubated          PREVIOUS WORKUP  IMAGING: none      LABS  Results for orders placed or performed during the hospital encounter of 12/20/15   PROTHROMBIN TIME + INR   Result Value Ref Range    INR 1.1 0.9 - 1.1      Prothrombin time 10.4 9.0 - 11.1 sec   PTT   Result Value Ref Range    aPTT 29.8 22.1 - 32.5 sec    aPTT, therapeutic range     58.0 - 77.0 SECS       PHYSICAL EXAM  Visit Vitals    Resp 20    Ht 5' 5\" (1.651 m)    Wt 77.6 kg (171 lb)    BMI 28.46 kg/m2     General:  Alert, cooperative, no distress. Head:  Normocephalic, without obvious abnormality, atraumatic. Eyes:  Conjunctivae/corneas clear.  Pupils equal, round, reactive to light. Extraocular movements intact, VFF, NO papilledema   Lungs:  Heart:   Non labored breathing  Regular rate and rhythm, no carotid bruits   Abdomen:   Soft, non-distended   Extremities: Extremities normal, atraumatic, no cyanosis or edema. Pulses: 2+ and symmetric all extremities. Skin: Skin color, texture, turgor normal. No rashes or lesions. Neurologic:  Gen: Attention normal             Language: naming, repetition, fluency normal             Memory: A&Ox3, 3/3 word recall  Cranial Nerves:  I: smell Not tested   II: visual fields Full to confrontation   II: pupils Equal, round, reactive to light   II: optic disc No papilledema   III,VII: ptosis none   III,IV,VI: extraocular muscles  Full ROM   V: mastication normal   V: facial light touch sensation  normal   VII: facial muscle function   symmetric   VIII: hearing symmetric   IX: soft palate elevation  normal   XI: trapezius strength  5/5   XI: sternocleidomastoid strength 5/5   XI: neck flexion strength  5/5   XII: tongue  midline     Motor: normal bulk and tone, no tremor              Strength: moves all four extremities equally  Sensory: intact to LT, PP, vibration, and temperature  Coordination: FTN intact  Gait: wide based  Reflexes: 1+ throughout       [address] is a 65 y.o. female who presents for evaluation of dementia and hallucinations. She has never been formally evaluated for her dementia and is not having any medication for this. Additionally hallucinations are likely secondary to her dementia. She has had issues with vivid dreams so will not do Aricept. Will start Namenda. Will have to monitor closely to ensure hallucinations do not progress. We will also adjust Seroquel dosing to see if this will help patient with her hallucinations. RECOMMENDATIONS  1. CT head  2. Reversible memory labs completed by PCP and negative  3.   We will check EEG  4.  Start Namenda 5 mg twice daily and titrate to 10 mg twice daily. Side effects discussed. Information given to family  11. We will hold off on Aricept due to history of vivid dreams  6. Will increase Seroquel up to 50 mg twice daily. Schedule given to family. Side effects discussed. 7.  Will do referral for neuropsychological testing. Family would like evaluation and help with safety factors, etc.    FU 3 months    Daniel Taveras MD    CC: Mehran Rocha MD  Fax: [phone]    This note was created using voice recognition software. Despite editing, there may be syntax errors. This note will not be viewable in [address]

## 2018-09-04 RX ORDER — QUETIAPINE FUMARATE 25 MG/1
TABLET, FILM COATED ORAL
Qty: 120 TAB | Refills: 0 | Status: SHIPPED | OUTPATIENT
Start: 2018-09-04 | End: 2021-08-27

## 2018-12-03 ENCOUNTER — HOSPITAL ENCOUNTER (EMERGENCY)
Age: 78
Discharge: HOME | End: 2018-12-04
Attending: EMERGENCY MEDICINE
Payer: MEDICARE

## 2018-12-03 DIAGNOSIS — R07.9 CHEST PAIN, UNSPECIFIED TYPE: ICD-10-CM

## 2018-12-03 DIAGNOSIS — R73.9 HYPERGLYCEMIA: Primary | ICD-10-CM

## 2018-12-03 LAB
COMMENT, HOLDF: NORMAL
GLUCOSE BLD STRIP.AUTO-MCNC: 398 MG/DL (ref 65–100)
SAMPLES BEING HELD,HOLD: NORMAL
SERVICE CMNT-IMP: ABNORMAL
UR CULT HOLD, URHOLD: NORMAL

## 2018-12-03 PROCEDURE — 80053 COMPREHEN METABOLIC PANEL: CPT

## 2018-12-03 PROCEDURE — 83880 ASSAY OF NATRIURETIC PEPTIDE: CPT

## 2018-12-03 PROCEDURE — 94762 N-INVAS EAR/PLS OXIMTRY CONT: CPT

## 2018-12-03 PROCEDURE — 82962 GLUCOSE BLOOD TEST: CPT

## 2018-12-03 PROCEDURE — 93005 ELECTROCARDIOGRAM TRACING: CPT

## 2018-12-03 PROCEDURE — 84100 ASSAY OF PHOSPHORUS: CPT

## 2018-12-03 PROCEDURE — 36415 COLL VENOUS BLD VENIPUNCTURE: CPT

## 2018-12-03 PROCEDURE — 85025 COMPLETE CBC W/AUTO DIFF WBC: CPT

## 2018-12-03 PROCEDURE — 84484 ASSAY OF TROPONIN QUANT: CPT

## 2018-12-03 PROCEDURE — 83735 ASSAY OF MAGNESIUM: CPT

## 2018-12-03 PROCEDURE — 99285 EMERGENCY DEPT VISIT HI MDM: CPT

## 2018-12-03 PROCEDURE — 74011250637 HC RX REV CODE- 250/637: Performed by: EMERGENCY MEDICINE

## 2018-12-03 PROCEDURE — 81001 URINALYSIS AUTO W/SCOPE: CPT

## 2018-12-03 RX ORDER — GUAIFENESIN 100 MG/5ML
162 LIQUID (ML) ORAL
Status: COMPLETED | OUTPATIENT
Start: 2018-12-03 | End: 2018-12-03

## 2018-12-03 RX ORDER — SODIUM CHLORIDE 0.9 % (FLUSH) 0.9 %
5-10 SYRINGE (ML) INJECTION AS NEEDED
Status: DISCONTINUED | OUTPATIENT
Start: 2018-12-03 | End: 2018-12-04 | Stop reason: HOSPADM

## 2018-12-03 RX ORDER — SODIUM CHLORIDE 0.9 % (FLUSH) 0.9 %
5-10 SYRINGE (ML) INJECTION EVERY 8 HOURS
Status: DISCONTINUED | OUTPATIENT
Start: 2018-12-03 | End: 2018-12-04 | Stop reason: HOSPADM

## 2018-12-03 RX ADMIN — Medication 10 ML: at 23:08

## 2018-12-03 RX ADMIN — ASPIRIN 81 MG 162 MG: 81 TABLET ORAL at 23:30

## 2018-12-04 ENCOUNTER — APPOINTMENT (OUTPATIENT)
Dept: GENERAL RADIOLOGY | Age: 78
End: 2018-12-04
Attending: EMERGENCY MEDICINE
Payer: MEDICARE

## 2018-12-04 VITALS
HEIGHT: 67 IN | BODY MASS INDEX: 26.84 KG/M2 | DIASTOLIC BLOOD PRESSURE: 47 MMHG | RESPIRATION RATE: 18 BRPM | TEMPERATURE: 97.7 F | HEART RATE: 57 BPM | OXYGEN SATURATION: 96 % | WEIGHT: 171 LBS | SYSTOLIC BLOOD PRESSURE: 142 MMHG

## 2018-12-04 LAB
ALBUMIN SERPL-MCNC: 3.3 G/DL (ref 3.5–5)
ALBUMIN/GLOB SERPL: 1 {RATIO} (ref 1.1–2.2)
ALP SERPL-CCNC: 111 U/L (ref 45–117)
ALT SERPL-CCNC: 17 U/L (ref 12–78)
ANION GAP SERPL CALC-SCNC: 9 MMOL/L (ref 5–15)
APPEARANCE UR: CLEAR
AST SERPL-CCNC: 13 U/L (ref 15–37)
ATRIAL RATE: 76 BPM
BACTERIA URNS QL MICRO: ABNORMAL /HPF
BASOPHILS # BLD: 0 K/UL (ref 0–0.1)
BASOPHILS NFR BLD: 0 % (ref 0–1)
BILIRUB SERPL-MCNC: 0.4 MG/DL (ref 0.2–1)
BILIRUB UR QL: NEGATIVE
BNP SERPL-MCNC: 632 PG/ML (ref 0–450)
BUN SERPL-MCNC: 45 MG/DL (ref 6–20)
BUN/CREAT SERPL: 20 (ref 12–20)
CALCIUM SERPL-MCNC: 9.1 MG/DL (ref 8.5–10.1)
CALCULATED P AXIS, ECG09: 74 DEGREES
CALCULATED R AXIS, ECG10: -94 DEGREES
CALCULATED T AXIS, ECG11: 74 DEGREES
CHLORIDE SERPL-SCNC: 105 MMOL/L (ref 97–108)
CO2 SERPL-SCNC: 28 MMOL/L (ref 21–32)
COLOR UR: ABNORMAL
CREAT SERPL-MCNC: 2.3 MG/DL (ref 0.55–1.02)
DIAGNOSIS, 93000: NORMAL
DIFFERENTIAL METHOD BLD: ABNORMAL
EOSINOPHIL # BLD: 0.2 K/UL (ref 0–0.4)
EOSINOPHIL NFR BLD: 3 % (ref 0–7)
EPITH CASTS URNS QL MICRO: ABNORMAL /LPF
ERYTHROCYTE [DISTWIDTH] IN BLOOD BY AUTOMATED COUNT: 13 % (ref 11.5–14.5)
GLOBULIN SER CALC-MCNC: 3.3 G/DL (ref 2–4)
GLUCOSE BLD STRIP.AUTO-MCNC: 286 MG/DL (ref 65–100)
GLUCOSE SERPL-MCNC: 373 MG/DL (ref 65–100)
GLUCOSE UR STRIP.AUTO-MCNC: >1000 MG/DL
HCT VFR BLD AUTO: 34 % (ref 35–47)
HGB BLD-MCNC: 11 G/DL (ref 11.5–16)
HGB UR QL STRIP: NEGATIVE
IMM GRANULOCYTES # BLD: 0 K/UL (ref 0–0.04)
IMM GRANULOCYTES NFR BLD AUTO: 0 % (ref 0–0.5)
KETONES UR QL STRIP.AUTO: NEGATIVE MG/DL
LEUKOCYTE ESTERASE UR QL STRIP.AUTO: ABNORMAL
LYMPHOCYTES # BLD: 1.9 K/UL (ref 0.8–3.5)
LYMPHOCYTES NFR BLD: 27 % (ref 12–49)
MAGNESIUM SERPL-MCNC: 2 MG/DL (ref 1.6–2.4)
MCH RBC QN AUTO: 30.3 PG (ref 26–34)
MCHC RBC AUTO-ENTMCNC: 32.4 G/DL (ref 30–36.5)
MCV RBC AUTO: 93.7 FL (ref 80–99)
MONOCYTES # BLD: 0.6 K/UL (ref 0–1)
MONOCYTES NFR BLD: 8 % (ref 5–13)
NEUTS SEG # BLD: 4.3 K/UL (ref 1.8–8)
NEUTS SEG NFR BLD: 61 % (ref 32–75)
NITRITE UR QL STRIP.AUTO: NEGATIVE
NRBC # BLD: 0 K/UL (ref 0–0.01)
NRBC BLD-RTO: 0 PER 100 WBC
P-R INTERVAL, ECG05: 194 MS
PH UR STRIP: 7 [PH] (ref 5–8)
PHOSPHATE SERPL-MCNC: 3.9 MG/DL (ref 2.6–4.7)
PLATELET # BLD AUTO: 105 K/UL (ref 150–400)
PMV BLD AUTO: 13.7 FL (ref 8.9–12.9)
POTASSIUM SERPL-SCNC: 4.6 MMOL/L (ref 3.5–5.1)
PROT SERPL-MCNC: 6.6 G/DL (ref 6.4–8.2)
PROT UR STRIP-MCNC: NEGATIVE MG/DL
Q-T INTERVAL, ECG07: 474 MS
QRS DURATION, ECG06: 178 MS
QTC CALCULATION (BEZET), ECG08: 533 MS
RBC # BLD AUTO: 3.63 M/UL (ref 3.8–5.2)
RBC #/AREA URNS HPF: ABNORMAL /HPF (ref 0–5)
SERVICE CMNT-IMP: ABNORMAL
SODIUM SERPL-SCNC: 142 MMOL/L (ref 136–145)
SP GR UR REFRACTOMETRY: 1.01 (ref 1–1.03)
TROPONIN I SERPL-MCNC: <0.05 NG/ML
TROPONIN I SERPL-MCNC: <0.05 NG/ML
UROBILINOGEN UR QL STRIP.AUTO: 0.2 EU/DL (ref 0.2–1)
VENTRICULAR RATE, ECG03: 76 BPM
WBC # BLD AUTO: 7.1 K/UL (ref 3.6–11)
WBC URNS QL MICRO: ABNORMAL /HPF (ref 0–4)

## 2018-12-04 PROCEDURE — 82962 GLUCOSE BLOOD TEST: CPT

## 2018-12-04 PROCEDURE — 36415 COLL VENOUS BLD VENIPUNCTURE: CPT

## 2018-12-04 PROCEDURE — 84484 ASSAY OF TROPONIN QUANT: CPT

## 2018-12-04 PROCEDURE — 71045 X-RAY EXAM CHEST 1 VIEW: CPT

## 2018-12-04 PROCEDURE — 96360 HYDRATION IV INFUSION INIT: CPT

## 2018-12-04 PROCEDURE — 74011250636 HC RX REV CODE- 250/636: Performed by: EMERGENCY MEDICINE

## 2018-12-04 RX ADMIN — SODIUM CHLORIDE 500 ML: 900 INJECTION, SOLUTION INTRAVENOUS at 00:43

## 2018-12-04 NOTE — ED NOTES
Dr Sergio Pierre reviewed discharge instructions with the patient and caregiver. The patient and caregiver verbalized understanding.

## 2018-12-04 NOTE — ED TRIAGE NOTES
Pt arrives via EMS c/o high blood sugar and chest pain since this AM, has a nitro patch but it did not help. BG read \"High\" on EMS glucometer. Hx of CVA, pacemaker, dementia, stage 4 kidney disease. Pt received 350ml NS en route.

## 2018-12-04 NOTE — DISCHARGE INSTRUCTIONS
We hope that we have addressed all of your medical concerns. The examination and treatment you received in the Emergency Department were for an emergent problem and were not intended as complete care. It is important that you follow up with your healthcare provider(s) for ongoing care. If your symptoms worsen or do not improve as expected, and you are unable to reach your usual health care provider(s), you should return to the Emergency Department. Today's healthcare is undergoing tremendous change, and patient satisfaction surveys are one of the many tools to assess the quality of medical care. You may receive a survey from the [employer] regarding your experience in the Emergency Department. I hope that your experience has been completely positive, particularly the medical care that I provided. As such, please participate in the survey; anything less than excellent does not meet my expectations or intentions. [facility] and [facility] participate in nationally recognized quality of care measures. If your blood pressure is greater than 120/80, as reported below, we urge that you seek medical care to address the potential of high blood pressure, commonly known as hypertension. Hypertension can be hereditary or can be caused by certain medical conditions, pain, stress, or \"white coat syndrome. \"       Please make an appointment with your health care provider(s) for follow up of your Emergency Department visit. VITALS:   Patient Vitals for the past 8 hrs:   Temp Pulse Resp BP SpO2   12/04/18 0130 -- (!) 56 16 138/41 96 %   12/04/18 0100 -- 60 18 118/41 (!) 88 %   12/04/18 0030 -- (!) 58 18 148/78 93 %   12/04/18 0015 -- (!) 59 14 151/54 94 %   12/03/18 2317 -- 61 17 (!) 124/95 95 %   12/03/18 2309 97.7 °F (36.5 °C) 69 15 (!) 124/95 95 %          Thank you for allowing us to provide you with medical care today.   We realize that you have many choices for your emergency care needs. Please choose us in the future for any continued health care needs. Zulmacamille Garcia, [facility].   Office: [phone]            Recent Results (from the past 24 hour(s))   GLUCOSE, POC    Collection Time: 12/03/18 11:10 PM   Result Value Ref Range    Glucose (POC) 398 (H) 65 - 100 mg/dL    Performed by Vish Matthews    CBC WITH AUTOMATED DIFF    Collection Time: 12/03/18 11:25 PM   Result Value Ref Range    WBC 7.1 3.6 - 11.0 K/uL    RBC 3.63 (L) 3.80 - 5.20 M/uL    HGB 11.0 (L) 11.5 - 16.0 g/dL    HCT 34.0 (L) 35.0 - 47.0 %    MCV 93.7 80.0 - 99.0 FL    MCH 30.3 26.0 - 34.0 PG    MCHC 32.4 30.0 - 36.5 g/dL    RDW 13.0 11.5 - 14.5 %    PLATELET 103 (L) 112 - 400 K/uL    MPV 13.7 (H) 8.9 - 12.9 FL    NRBC 0.0 0  WBC    ABSOLUTE NRBC 0.00 0.00 - 0.01 K/uL    NEUTROPHILS 61 32 - 75 %    LYMPHOCYTES 27 12 - 49 %    MONOCYTES 8 5 - 13 %    EOSINOPHILS 3 0 - 7 %    BASOPHILS 0 0 - 1 %    IMMATURE GRANULOCYTES 0 0.0 - 0.5 %    ABS. NEUTROPHILS 4.3 1.8 - 8.0 K/UL    ABS. LYMPHOCYTES 1.9 0.8 - 3.5 K/UL    ABS. MONOCYTES 0.6 0.0 - 1.0 K/UL    ABS. EOSINOPHILS 0.2 0.0 - 0.4 K/UL    ABS. BASOPHILS 0.0 0.0 - 0.1 K/UL    ABS. IMM. GRANS. 0.0 0.00 - 0.04 K/UL    DF AUTOMATED     METABOLIC PANEL, COMPREHENSIVE    Collection Time: 12/03/18 11:25 PM   Result Value Ref Range    Sodium 142 136 - 145 mmol/L    Potassium 4.6 3.5 - 5.1 mmol/L    Chloride 105 97 - 108 mmol/L    CO2 28 21 - 32 mmol/L    Anion gap 9 5 - 15 mmol/L    Glucose 373 (H) 65 - 100 mg/dL    BUN 45 (H) 6 - 20 MG/DL    Creatinine 2.30 (H) 0.55 - 1.02 MG/DL    BUN/Creatinine ratio 20 12 - 20      GFR est AA 25 (L) >60 ml/min/1.73m2    GFR est non-AA 20 (L) >60 ml/min/1.73m2    Calcium 9.1 8.5 - 10.1 MG/DL    Bilirubin, total 0.4 0.2 - 1.0 MG/DL    ALT (SGPT) 17 12 - 78 U/L    AST (SGOT) 13 (L) 15 - 37 U/L    Alk.  phosphatase 111 45 - 117 U/L    Protein, total 6.6 6.4 - 8.2 g/dL    Albumin 3.3 (L) 3.5 - 5.0 g/dL    Globulin 3.3 2.0 - 4.0 g/dL    A-G Ratio 1.0 (L) 1.1 - 2.2     MAGNESIUM    Collection Time: 12/03/18 11:25 PM   Result Value Ref Range    Magnesium 2.0 1.6 - 2.4 mg/dL   PHOSPHORUS    Collection Time: 12/03/18 11:25 PM   Result Value Ref Range    Phosphorus 3.9 2.6 - 4.7 MG/DL   SAMPLES BEING HELD    Collection Time: 12/03/18 11:25 PM   Result Value Ref Range    SAMPLES BEING HELD SST,RD,MARILEE     COMMENT        Add-on orders for these samples will be processed based on acceptable specimen integrity and analyte stability, which may vary by analyte. TROPONIN I    Collection Time: 12/03/18 11:25 PM   Result Value Ref Range    Troponin-I, Qt. <0.05 <0.05 ng/mL   URINALYSIS W/MICROSCOPIC    Collection Time: 12/03/18 11:25 PM   Result Value Ref Range    Color YELLOW/STRAW      Appearance CLEAR CLEAR      Specific gravity 1.012 1.003 - 1.030      pH (UA) 7.0 5.0 - 8.0      Protein NEGATIVE  NEG mg/dL    Glucose >1,000 (A) NEG mg/dL    Ketone NEGATIVE  NEG mg/dL    Bilirubin NEGATIVE  NEG      Blood NEGATIVE  NEG      Urobilinogen 0.2 0.2 - 1.0 EU/dL    Nitrites NEGATIVE  NEG      Leukocyte Esterase TRACE (A) NEG      WBC 0-4 0 - 4 /hpf    RBC 0-5 0 - 5 /hpf    Epithelial cells FEW FEW /lpf    Bacteria 1+ (A) NEG /hpf   URINE CULTURE HOLD SAMPLE    Collection Time: 12/03/18 11:25 PM   Result Value Ref Range    Urine culture hold        URINE ON HOLD IN MICROBIOLOGY DEPT FOR 3 DAYS. IF UNPRESERVED URINE IS SUBMITTED, IT CANNOT BE USED FOR ADDITIONAL TESTING AFTER 24 HRS, RECOLLECTION WILL BE REQUIRED.    NT-PRO BNP    Collection Time: 12/03/18 11:25 PM   Result Value Ref Range    NT pro- (H) 0 - 450 PG/ML   GLUCOSE, POC    Collection Time: 12/04/18  1:09 AM   Result Value Ref Range    Glucose (POC) 286 (H) 65 - 100 mg/dL    Performed by Chiqui Dunlap    TROPONIN I    Collection Time: 12/04/18  1:25 AM   Result Value Ref Range    Troponin-I, Qt. <0.05 <0.05 ng/mL       Xr Chest Port    Result Date: 12/4/2018  EXAM:  XR CHEST PORT INDICATION:  chest pain COMPARISON:  September 2015 FINDINGS: A portable AP radiograph of the chest was obtained at 0012 hours. Right subclavian leads are seen in place. The patient is on a cardiac monitor. The lungs are clear. The cardiac and mediastinal contours and pulmonary vascularity are normal.  The bones and soft tissues are grossly within normal limits. IMPRESSION: No acute findings. Learning About High Blood Sugar  What is high blood sugar? Your body turns the food you eat into glucose (sugar), which it uses for energy. But if your body isn't able to use the sugar right away, it can build up in your blood and lead to high blood sugar. When the amount of sugar in your blood stays too high for too much of the time, you may have diabetes. Diabetes is a disease that can cause serious health problems. The good news is that lifestyle changes may help you get your blood sugar back to normal and avoid or delay diabetes. What causes high blood sugar? Sugar (glucose) can build up in your blood if you:  · Are overweight. · Have a family history of diabetes. · Take certain medicines, such as steroids. What are the symptoms? Having high blood sugar may not cause any symptoms at all. Or it may make you feel very thirsty or very hungry. You may also urinate more often than usual, have blurry vision, or lose weight without trying. How is high blood sugar treated? You can take steps to lower your blood sugar level if you understand what makes it get higher. Your doctor may want you to learn how to test your blood sugar level at home. Then you can see how illness, stress, or different kinds of food or medicine raise or lower your blood sugar level. Other tests may be needed to see if you have diabetes. How can you prevent high blood sugar? · Watch your weight.  If you're overweight, losing just a small amount of weight may help. Reducing fat around your waist is most important. · Limit the amount of calories, sweets, and unhealthy fat you eat. Ask your doctor if a dietitian can help you. A registered dietitian can help you create meal plans that fit your lifestyle. · Get at least 30 minutes of exercise on most days of the week. Exercise helps control your blood sugar. It also helps you maintain a healthy weight. Walking is a good choice. You also may want to do other activities, such as running, swimming, cycling, or playing tennis or team sports. · If your doctor prescribed medicines, take them exactly as prescribed. Call your doctor if you think you are having a problem with your medicine. You will get more details on the specific medicines your doctor prescribes. Follow-up care is a key part of your treatment and safety. Be sure to make and go to all appointments, and call your doctor if you are having problems. It's also a good idea to know your test results and keep a list of the medicines you take. Where can you learn more? Go to http://carlos-summer.info/. Enter O108 in the search box to learn more about \"Learning About High Blood Sugar. \"  Current as of: December 7, 2017  Content Version: 11.8  © 9194-2169 [employer]. Care instructions adapted under license by [employer] (which disclaims liability or warranty for this information). If you have questions about a medical condition or this instruction, always ask your healthcare professional. [place] [address] any warranty or liability for your use of this information. Chest Pain: Care Instructions  Your Care Instructions    There are many things that can cause chest pain. Some are not serious and will get better on their own in a few days. But some kinds of chest pain need more testing and treatment. Your doctor may have recommended a follow-up visit in the next 8 to 12 hours.  If you are not getting better, you may need more tests or treatment. Even though your doctor has released you, you still need to watch for any problems. The doctor carefully checked you, but sometimes problems can develop later. If you have new symptoms or if your symptoms do not get better, get medical care right away. If you have worse or different chest pain or pressure that lasts more than 5 minutes or you passed out (lost consciousness), call 911 or seek other emergency help right away. A medical visit is only one step in your treatment. Even if you feel better, you still need to do what your doctor recommends, such as going to all suggested follow-up appointments and taking medicines exactly as directed. This will help you recover and help prevent future problems. How can you care for yourself at home? · Rest until you feel better. · Take your medicine exactly as prescribed. Call your doctor if you think you are having a problem with your medicine. · Do not drive after taking a prescription pain medicine. When should you call for help? Call 911 if:    · You passed out (lost consciousness).     · You have severe difficulty breathing.     · You have symptoms of a heart attack. These may include:  ? Chest pain or pressure, or a strange feeling in your chest.  ? Sweating. ? Shortness of breath. ? Nausea or vomiting. ? Pain, pressure, or a strange feeling in your back, neck, jaw, or upper belly or in one or both shoulders or arms. ? Lightheadedness or sudden weakness. ? A fast or irregular heartbeat. After you call 911, the  may tell you to chew 1 adult-strength or 2 to 4 low-dose aspirin. Wait for an ambulance. Do not try to drive yourself.    Call your doctor today if:    · You have any trouble breathing.     · Your chest pain gets worse.     · You are dizzy or lightheaded, or you feel like you may faint.     · You are not getting better as expected.     · You are having new or different chest pain. Where can you learn more? Go to http://carlos-summer.info/. Enter A120 in the search box to learn more about \"Chest Pain: Care Instructions. \"  Current as of: November 20, 2017  Content Version: 11.8  © 0847-0940 [employer]. Care instructions adapted under license by [employer] (which disclaims liability or warranty for this information). If you have questions about a medical condition or this instruction, always ask your healthcare professional. [place] [address] any warranty or liability for your use of this information.

## 2018-12-04 NOTE — ED PROVIDER NOTES
This is a 65-year-old female who comes emergency room with chief complaint of hyperglycemia and chest pain. Patient has had chest pain and hyperglycemia since Monday morning. Patient put on a nitroglycerin patch but did not help her chest pain. Patient's blood sugars run high on her glucometer. EMS gave the patient 350 mL's of normal saline. Patient has a history of stroke, pacemaker, mild dementia, and chronic kidney disease. Patient lives with her niece and ambulates with a walker. The history is provided by the patient, the EMS personnel and a relative. No  was used. High Blood Sugar This is a new problem. The current episode started 12 to 24 hours ago. The problem occurs hourly. The problem has been gradually worsening. Associated symptoms include chest pain. Pertinent negatives include no fever, no diarrhea, no nausea, no vomiting, no constipation, no dysuria, no frequency, no hematuria, no myalgias and no back pain. Nothing worsens the pain. The pain is relieved by nothing. Her past medical history is significant for DM. The patient's surgical history includes appendectomy and hysterectomy. Chest Pain This is a new problem. The current episode started 12 to 24 hours ago. The problem has not changed since onset. The pain is at a severity of 8/10. The pain is moderate. The quality of the pain is described as dull. The pain does not radiate. Pertinent negatives include no abdominal pain, no back pain, no cough, no dizziness, no fever, no irregular heartbeat, no nausea, no numbness, no palpitations, no shortness of breath, no vomiting and no weakness. She has tried nitroglycerin for the symptoms. The treatment provided no relief. Risk factors include hypertension and diabetes mellitus. Her past medical history is significant for DM and HTN. Procedural history includes pacemaker. Past Medical History:  
Diagnosis Date  Diabetes ([facility] [address]  Diverticulitis  Hypertension  Renal failure Past Surgical History:  
Procedure Laterality Date  HX APPENDECTOMY  HX CYST REMOVAL    
 HX HYSTERECTOMY  HX IMPLANTABLE CARDIOVERTER DEFIBRILLATOR  HX PACEMAKER No family history on file. Social History Socioeconomic History  Marital status:  Spouse name: Not on file  Number of children: Not on file  Years of education: Not on file  Highest education level: Not on file Social Needs  Financial resource strain: Not on file  Food insecurity - worry: Not on file  Food insecurity - inability: Not on file  Transportation needs - medical: Not on file  Transportation needs - non-medical: Not on file Occupational History  Not on file Tobacco Use  Smoking status: Never Smoker Substance and Sexual Activity  Alcohol use: No  
 Drug use: Not on file  Sexual activity: Not on file Other Topics Concern  Not on file Social History Narrative  Not on file ALLERGIES: Codeine Review of Systems Constitutional: Negative for appetite change, chills, fever and unexpected weight change. HENT: Negative for ear pain, hearing loss, rhinorrhea and trouble swallowing. Eyes: Negative for pain and visual disturbance. Respiratory: Negative for cough, chest tightness and shortness of breath. Cardiovascular: Positive for chest pain. Negative for palpitations. Gastrointestinal: Negative for abdominal distention, abdominal pain, blood in stool, constipation, diarrhea, nausea and vomiting. Genitourinary: Negative for dysuria, frequency, hematuria and urgency. Musculoskeletal: Negative for back pain and myalgias. Skin: Negative for rash. Neurological: Negative for dizziness, syncope, weakness and numbness. Psychiatric/Behavioral: Negative for confusion and suicidal ideas. All other systems reviewed and are negative. Vitals:  
 12/04/18 0100 12/04/18 0130 12/04/18 0200 12/04/18 0230 BP: 118/41 138/41 (!) 128/38 142/47 Pulse: 60 (!) 56 63 (!) 57 Resp: 18 16 17 18 Temp:      
SpO2: (!) 88% 96% 94% 96% Weight:      
Height:      
      
 
Physical Exam  
Constitutional: She is oriented to person, place, and time. She appears well-developed and well-nourished. No distress. elderly HENT:  
Head: Normocephalic and atraumatic. Right Ear: External ear normal.  
Left Ear: External ear normal.  
Nose: Nose normal.  
Mouth/Throat: Oropharynx is clear and moist. No oropharyngeal exudate. Eyes: Conjunctivae and EOM are normal. Pupils are equal, round, and reactive to light. Right eye exhibits no discharge. Left eye exhibits no discharge. No scleral icterus. Neck: Normal range of motion. Neck supple. No JVD present. No tracheal deviation present. Cardiovascular: Normal rate, regular rhythm and intact distal pulses. Exam reveals no gallop and no friction rub. Murmur heard. Systolic murmur is present with a grade of 2/6. Pulmonary/Chest: Effort normal and breath sounds normal. No stridor. No respiratory distress. She has no decreased breath sounds. She has no wheezes. She has no rhonchi. She has no rales. She exhibits no tenderness. Abdominal: Soft. Bowel sounds are normal. She exhibits no distension. There is no tenderness. There is no rebound and no guarding. Musculoskeletal: Normal range of motion. She exhibits no edema or tenderness. Neurological: She is alert and oriented to person, place, and time. She has normal strength and normal reflexes. She displays normal reflexes. No cranial nerve deficit or sensory deficit. She exhibits normal muscle tone. Coordination normal. GCS eye subscore is 4. GCS verbal subscore is 5. GCS motor subscore is 6. Skin: Skin is warm and dry. Capillary refill takes less than 2 seconds. No rash noted. She is not diaphoretic. No erythema. No pallor. Psychiatric: She has a normal mood and affect.  Her behavior is normal. Judgment and thought content normal.  
Nursing note and vitals reviewed. MDM Number of Diagnoses or Management Options Amount and/or Complexity of Data Reviewed Clinical lab tests: ordered and reviewed Tests in the radiology section of CPT®: ordered and reviewed Tests in the medicine section of CPT®: ordered and reviewed Independent visualization of images, tracings, or specimens: yes (EKG) Risk of Complications, Morbidity, and/or Mortality Presenting problems: moderate Diagnostic procedures: low Management options: moderate Patient Progress Patient progress: stable Procedures Chief Complaint Patient presents with  
 High Blood Sugar  Chest Pain The patient's presenting problems have been discussed, and they are in agreement with the care plan formulated and outlined with them. I have encouraged them to ask questions as they arise throughout their visit. MEDICATIONS GIVEN: 
Medications  
sodium chloride (NS) flush 5-10 mL (10 mL IntraVENous Given 12/3/18 2808) sodium chloride (NS) flush 5-10 mL (not administered) aspirin chewable tablet 162 mg (162 mg Oral Given 12/3/18 2330)  
sodium chloride 0.9 % bolus infusion 500 mL (0 mL IntraVENous IV Completed 12/4/18 0150) LABS REVIEWED: 
Recent Results (from the past 24 hour(s)) GLUCOSE, POC Collection Time: 12/03/18 11:10 PM  
Result Value Ref Range Glucose (POC) 398 (H) 65 - 100 mg/dL Performed by Rodrigo Fernandes CBC WITH AUTOMATED DIFF Collection Time: 12/03/18 11:25 PM  
Result Value Ref Range WBC 7.1 3.6 - 11.0 K/uL  
 RBC 3.63 (L) 3.80 - 5.20 M/uL  
 HGB 11.0 (L) 11.5 - 16.0 g/dL HCT 34.0 (L) 35.0 - 47.0 % MCV 93.7 80.0 - 99.0 FL  
 MCH 30.3 26.0 - 34.0 PG  
 MCHC 32.4 30.0 - 36.5 g/dL  
 RDW 13.0 11.5 - 14.5 % PLATELET 036 (L) 990 - 400 K/uL MPV 13.7 (H) 8.9 - 12.9 FL  
 NRBC 0.0 0  WBC ABSOLUTE NRBC 0.00 0.00 - 0.01 K/uL NEUTROPHILS 61 32 - 75 % LYMPHOCYTES 27 12 - 49 % MONOCYTES 8 5 - 13 % EOSINOPHILS 3 0 - 7 % BASOPHILS 0 0 - 1 % IMMATURE GRANULOCYTES 0 0.0 - 0.5 % ABS. NEUTROPHILS 4.3 1.8 - 8.0 K/UL  
 ABS. LYMPHOCYTES 1.9 0.8 - 3.5 K/UL  
 ABS. MONOCYTES 0.6 0.0 - 1.0 K/UL  
 ABS. EOSINOPHILS 0.2 0.0 - 0.4 K/UL  
 ABS. BASOPHILS 0.0 0.0 - 0.1 K/UL  
 ABS. IMM. GRANS. 0.0 0.00 - 0.04 K/UL  
 DF AUTOMATED METABOLIC PANEL, COMPREHENSIVE Collection Time: 12/03/18 11:25 PM  
Result Value Ref Range Sodium 142 136 - 145 mmol/L Potassium 4.6 3.5 - 5.1 mmol/L Chloride 105 97 - 108 mmol/L  
 CO2 28 21 - 32 mmol/L Anion gap 9 5 - 15 mmol/L Glucose 373 (H) 65 - 100 mg/dL BUN 45 (H) 6 - 20 MG/DL Creatinine 2.30 (H) 0.55 - 1.02 MG/DL  
 BUN/Creatinine ratio 20 12 - 20 GFR est AA 25 (L) >60 ml/min/1.73m2 GFR est non-AA 20 (L) >60 ml/min/1.73m2 Calcium 9.1 8.5 - 10.1 MG/DL Bilirubin, total 0.4 0.2 - 1.0 MG/DL  
 ALT (SGPT) 17 12 - 78 U/L  
 AST (SGOT) 13 (L) 15 - 37 U/L Alk. phosphatase 111 45 - 117 U/L Protein, total 6.6 6.4 - 8.2 g/dL Albumin 3.3 (L) 3.5 - 5.0 g/dL Globulin 3.3 2.0 - 4.0 g/dL A-G Ratio 1.0 (L) 1.1 - 2.2 MAGNESIUM Collection Time: 12/03/18 11:25 PM  
Result Value Ref Range Magnesium 2.0 1.6 - 2.4 mg/dL PHOSPHORUS Collection Time: 12/03/18 11:25 PM  
Result Value Ref Range Phosphorus 3.9 2.6 - 4.7 MG/DL  
SAMPLES BEING HELD Collection Time: 12/03/18 11:25 PM  
Result Value Ref Range SAMPLES BEING HELD SST,RD,MARILEE   
 COMMENT Add-on orders for these samples will be processed based on acceptable specimen integrity and analyte stability, which may vary by analyte. TROPONIN I Collection Time: 12/03/18 11:25 PM  
Result Value Ref Range Troponin-I, Qt. <0.05 <0.05 ng/mL URINALYSIS W/MICROSCOPIC Collection Time: 12/03/18 11:25 PM  
Result Value Ref Range Color YELLOW/STRAW  Appearance CLEAR CLEAR    
 Specific gravity 1.012 1.003 - 1.030    
 pH (UA) 7.0 5.0 - 8.0 Protein NEGATIVE  NEG mg/dL Glucose >1,000 (A) NEG mg/dL Ketone NEGATIVE  NEG mg/dL Bilirubin NEGATIVE  NEG Blood NEGATIVE  NEG Urobilinogen 0.2 0.2 - 1.0 EU/dL Nitrites NEGATIVE  NEG Leukocyte Esterase TRACE (A) NEG    
 WBC 0-4 0 - 4 /hpf  
 RBC 0-5 0 - 5 /hpf Epithelial cells FEW FEW /lpf Bacteria 1+ (A) NEG /hpf URINE CULTURE HOLD SAMPLE Collection Time: 12/03/18 11:25 PM  
Result Value Ref Range Urine culture hold URINE ON HOLD IN MICROBIOLOGY DEPT FOR 3 DAYS. IF UNPRESERVED URINE IS SUBMITTED, IT CANNOT BE USED FOR ADDITIONAL TESTING AFTER 24 HRS, RECOLLECTION WILL BE REQUIRED. NT-PRO BNP Collection Time: 12/03/18 11:25 PM  
Result Value Ref Range NT pro- (H) 0 - 450 PG/ML  
GLUCOSE, POC Collection Time: 12/04/18  1:09 AM  
Result Value Ref Range Glucose (POC) 286 (H) 65 - 100 mg/dL Performed by Maria C Situ TROPONIN I Collection Time: 12/04/18  1:25 AM  
Result Value Ref Range Troponin-I, Qt. <0.05 <0.05 ng/mL VITAL SIGNS: 
Patient Vitals for the past 12 hrs: 
 Temp Pulse Resp BP SpO2  
12/04/18 0230  (!) 57 18 142/47 96 % 12/04/18 0200  63 17 (!) 128/38 94 % 12/04/18 0130  (!) 56 16 138/41 96 % 12/04/18 0100  60 18 118/41 (!) 88 % 12/04/18 0030  (!) 58 18 148/78 93 % 12/04/18 0015  (!) 59 14 151/54 94 % 12/03/18 2317  61 17 (!) 124/95 95 % 12/03/18 2309 97.7 °F (36.5 °C) 69 15 (!) 124/95 95 % RADIOLOGY RESULTS: 
The following have been ordered and reviewed: 
Xr Chest [facility] Result Date: 12/4/2018 EXAM:  XR CHEST PORT INDICATION:  chest pain COMPARISON:  September 2015 FINDINGS: A portable AP radiograph of the chest was obtained at 0012 hours. Right subclavian leads are seen in place. The patient is on a cardiac monitor. The lungs are clear.   The cardiac and mediastinal contours and pulmonary vascularity are normal.  The bones and soft tissues are grossly within normal limits. IMPRESSION: No acute findings. ED EKG interpretation: 
Rhythm: atrial-sensed ventricular-paced and occasional PVCs; and regular . Rate (approx.): 76; P wave: normal; QRS interval: normal ; ST/T wave: non-specific changes; Other findings: abnormal ekg. This EKG was interpreted by Lorna Cintron DO, ED Provider. PROGRESS NOTES: 
Discussed results and plan with patient and neice. Patient will be discharged home with PCP follow up. Patient instructed to return to the emergency room for any worsening symptoms or any other concerns. DIAGNOSIS: 
 
1. Hyperglycemia 2. Chest pain, unspecified type PLAN: 
Follow-up Information Follow up With Specialties Details Why Contact Info Keli Tijerina MD Family Practice Schedule an appointment as soon as possible for a visit  [address] [address] 
[phone] [facility] EMERGENCY DEPT Emergency Medicine  If symptoms worsen [address] 
[phone] Current Discharge Medication List  
  
CONTINUE these medications which have NOT CHANGED Details QUEtiapine (SEROQUEL) 25 mg tablet take 2 tablets by mouth twice a day 
Qty: 120 Tab, Refills: 0 Comments: Patient will need an appointment for further refills  
  
gabapentin (NEURONTIN) 300 mg capsule Take one capsule by mouth in the morning and 2 capsules at night Refills: 0  
  
levothyroxine (SYNTHROID) 50 mcg tablet Take one tablet by mouth in the am 
Refills: 0  
  
losartan (COZAAR) 25 mg tablet take 1/2 tablet by mouth once daily Refills: 0  
  
raNITIdine (ZANTAC) 150 mg tablet take 1 tablet by mouth at bedtime Refills: 0  
  
sodium bicarbonate 325 mg tablet Take one tablet by mouth three times a day Refills: 0 ALPRAZolam (XANAX) 0.5 mg tablet take 1 tablet by mouth at bedtime if needed for 30 DAYS Refills: 0  
  
nitroglycerin (NITRODUR) 0.4 mg/hr apply 1 patch TO SKIN. REMOVE AFTER 24 HOURS ONCE A DAY Refills: 0  
  
ergocalciferol (ERGOCALCIFEROL) 50,000 unit capsule Take 50,000 Units by mouth every 7 days  
  
cetirizine (ZYRTEC) 5 mg tablet Take 5 mg by mouth daily. memantine (NAMENDA) 10 mg tablet Take 1 Tab by mouth daily. Qty: 60 Tab, Refills: 5 Associated Diagnoses: Dementia with behavioral disturbance, unspecified dementia type; Hallucinations BD INSULIN PEN NEEDLE UF SHORT 31 X 5/16 \" ndle Refills: 0  
  
montelukast (SINGULAIR) 10 mg tablet Refills: 1 HUMALOG KWIKPEN 100 unit/mL kwikpen Refills: 4 LANTUS SOLOSTAR 100 unit/mL (3 mL) pen Refills: 4 NITROSTAT 0.4 mg SL tablet Refills: 0  
  
sertraline (ZOLOFT) 50 mg tablet Refills: 0  
  
spironolactone (ALDACTONE) 25 mg tablet Refills: 0 NOVOLOG FLEXPEN 100 unit/mL flexpen Refills: 0  
  
hydrALAZINE (APRESOLINE) 50 mg tablet Refills: 0  
  
clopidogrel (PLAVIX) 75 mg tablet Refills: 0  
  
carvedilol (COREG) 12.5 mg tablet Refills: 0  
  
amLODIPine (NORVASC) 2.5 mg tablet Refills: 0  
  
aspirin 81 mg chewable tablet Refills: 0  
  
atorvastatin (LIPITOR) 20 mg tablet Refills: 0  
  
ONETOUCH ULTRA TEST strip Refills: 5 ED COURSE: The patient's hospital course has been uncomplicated.

## 2021-08-26 ENCOUNTER — HOSPITAL ENCOUNTER (INPATIENT)
Age: 81
LOS: 16 days | Discharge: HOSPICE | DRG: 871 | End: 2021-09-11
Attending: EMERGENCY MEDICINE | Admitting: INTERNAL MEDICINE
Payer: MEDICARE

## 2021-08-26 ENCOUNTER — APPOINTMENT (OUTPATIENT)
Dept: GENERAL RADIOLOGY | Age: 81
DRG: 871 | End: 2021-08-26
Attending: EMERGENCY MEDICINE
Payer: MEDICARE

## 2021-08-26 DIAGNOSIS — E11.65 TYPE 2 DIABETES MELLITUS WITH HYPERGLYCEMIA, WITH LONG-TERM CURRENT USE OF INSULIN (HCC): ICD-10-CM

## 2021-08-26 DIAGNOSIS — A41.9 SEPSIS, DUE TO UNSPECIFIED ORGANISM, UNSPECIFIED WHETHER ACUTE ORGAN DYSFUNCTION PRESENT (HCC): Primary | ICD-10-CM

## 2021-08-26 DIAGNOSIS — F03.91 DEMENTIA WITH BEHAVIORAL DISTURBANCE, UNSPECIFIED DEMENTIA TYPE: ICD-10-CM

## 2021-08-26 DIAGNOSIS — F03.90 DEMENTIA WITHOUT BEHAVIORAL DISTURBANCE, UNSPECIFIED DEMENTIA TYPE: ICD-10-CM

## 2021-08-26 DIAGNOSIS — G93.41 METABOLIC ENCEPHALOPATHY: ICD-10-CM

## 2021-08-26 DIAGNOSIS — Z71.89 GOALS OF CARE, COUNSELING/DISCUSSION: ICD-10-CM

## 2021-08-26 DIAGNOSIS — Z79.4 TYPE 2 DIABETES MELLITUS WITH HYPERGLYCEMIA, WITH LONG-TERM CURRENT USE OF INSULIN (HCC): ICD-10-CM

## 2021-08-26 DIAGNOSIS — J18.9 PNEUMONIA OF BOTH LUNGS DUE TO INFECTIOUS ORGANISM, UNSPECIFIED PART OF LUNG: ICD-10-CM

## 2021-08-26 DIAGNOSIS — N18.9 CHRONIC KIDNEY DISEASE, UNSPECIFIED CKD STAGE: ICD-10-CM

## 2021-08-26 LAB
ALBUMIN SERPL-MCNC: 3.2 G/DL (ref 3.5–5)
ALBUMIN/GLOB SERPL: 0.8 {RATIO} (ref 1.1–2.2)
ALP SERPL-CCNC: 63 U/L (ref 45–117)
ALT SERPL-CCNC: 13 U/L (ref 12–78)
AMORPH CRY URNS QL MICRO: ABNORMAL
ANION GAP SERPL CALC-SCNC: 10 MMOL/L (ref 5–15)
APPEARANCE UR: CLEAR
AST SERPL-CCNC: 10 U/L (ref 15–37)
BACTERIA URNS QL MICRO: NEGATIVE /HPF
BASOPHILS # BLD: 0.1 K/UL (ref 0–0.1)
BASOPHILS NFR BLD: 0 % (ref 0–1)
BILIRUB SERPL-MCNC: 1.1 MG/DL (ref 0.2–1)
BILIRUB UR QL: NEGATIVE
BUN SERPL-MCNC: 45 MG/DL (ref 6–20)
BUN/CREAT SERPL: 19 (ref 12–20)
CALCIUM SERPL-MCNC: 9 MG/DL (ref 8.5–10.1)
CHLORIDE SERPL-SCNC: 106 MMOL/L (ref 97–108)
CO2 SERPL-SCNC: 25 MMOL/L (ref 21–32)
COLOR UR: ABNORMAL
COMMENT, HOLDF: NORMAL
COVID-19 RAPID TEST, COVR: NOT DETECTED
CREAT SERPL-MCNC: 2.42 MG/DL (ref 0.55–1.02)
DIFFERENTIAL METHOD BLD: ABNORMAL
EOSINOPHIL # BLD: 0.2 K/UL (ref 0–0.4)
EOSINOPHIL NFR BLD: 1 % (ref 0–7)
EPITH CASTS URNS QL MICRO: ABNORMAL /LPF
ERYTHROCYTE [DISTWIDTH] IN BLOOD BY AUTOMATED COUNT: 14.4 % (ref 11.5–14.5)
GLOBULIN SER CALC-MCNC: 3.8 G/DL (ref 2–4)
GLUCOSE SERPL-MCNC: 295 MG/DL (ref 65–100)
GLUCOSE UR STRIP.AUTO-MCNC: 250 MG/DL
HCT VFR BLD AUTO: 33.5 % (ref 35–47)
HGB BLD-MCNC: 10.9 G/DL (ref 11.5–16)
HGB UR QL STRIP: NEGATIVE
IMM GRANULOCYTES # BLD AUTO: 0.1 K/UL (ref 0–0.04)
IMM GRANULOCYTES NFR BLD AUTO: 1 % (ref 0–0.5)
KETONES UR QL STRIP.AUTO: NEGATIVE MG/DL
LACTATE BLD-SCNC: 2.6 MMOL/L (ref 0.4–2)
LACTATE SERPL-SCNC: 2 MMOL/L (ref 0.4–2)
LEUKOCYTE ESTERASE UR QL STRIP.AUTO: NEGATIVE
LYMPHOCYTES # BLD: 1.6 K/UL (ref 0.8–3.5)
LYMPHOCYTES NFR BLD: 8 % (ref 12–49)
MCH RBC QN AUTO: 29.5 PG (ref 26–34)
MCHC RBC AUTO-ENTMCNC: 32.5 G/DL (ref 30–36.5)
MCV RBC AUTO: 90.5 FL (ref 80–99)
MONOCYTES # BLD: 1.2 K/UL (ref 0–1)
MONOCYTES NFR BLD: 6 % (ref 5–13)
NEUTS SEG # BLD: 17 K/UL (ref 1.8–8)
NEUTS SEG NFR BLD: 84 % (ref 32–75)
NITRITE UR QL STRIP.AUTO: NEGATIVE
NRBC # BLD: 0 K/UL (ref 0–0.01)
NRBC BLD-RTO: 0 PER 100 WBC
PH UR STRIP: 6 [PH] (ref 5–8)
PLATELET # BLD AUTO: 105 K/UL (ref 150–400)
POTASSIUM SERPL-SCNC: 4.8 MMOL/L (ref 3.5–5.1)
PROT SERPL-MCNC: 7 G/DL (ref 6.4–8.2)
PROT UR STRIP-MCNC: 100 MG/DL
RBC # BLD AUTO: 3.7 M/UL (ref 3.8–5.2)
RBC #/AREA URNS HPF: ABNORMAL /HPF (ref 0–5)
SAMPLES BEING HELD,HOLD: NORMAL
SODIUM SERPL-SCNC: 141 MMOL/L (ref 136–145)
SOURCE, COVRS: NORMAL
SP GR UR REFRACTOMETRY: 1.02 (ref 1–1.03)
TROPONIN I SERPL-MCNC: <0.05 NG/ML
UR CULT HOLD, URHOLD: NORMAL
UROBILINOGEN UR QL STRIP.AUTO: 0.2 EU/DL (ref 0.2–1)
WBC # BLD AUTO: 20.2 K/UL (ref 3.6–11)
WBC URNS QL MICRO: ABNORMAL /HPF (ref 0–4)

## 2021-08-26 PROCEDURE — 87040 BLOOD CULTURE FOR BACTERIA: CPT

## 2021-08-26 PROCEDURE — 83605 ASSAY OF LACTIC ACID: CPT

## 2021-08-26 PROCEDURE — 96374 THER/PROPH/DIAG INJ IV PUSH: CPT

## 2021-08-26 PROCEDURE — 80053 COMPREHEN METABOLIC PANEL: CPT

## 2021-08-26 PROCEDURE — 99285 EMERGENCY DEPT VISIT HI MDM: CPT

## 2021-08-26 PROCEDURE — 87635 SARS-COV-2 COVID-19 AMP PRB: CPT

## 2021-08-26 PROCEDURE — 74011000250 HC RX REV CODE- 250: Performed by: EMERGENCY MEDICINE

## 2021-08-26 PROCEDURE — 65270000029 HC RM PRIVATE

## 2021-08-26 PROCEDURE — 74011250637 HC RX REV CODE- 250/637: Performed by: EMERGENCY MEDICINE

## 2021-08-26 PROCEDURE — 94640 AIRWAY INHALATION TREATMENT: CPT

## 2021-08-26 PROCEDURE — 74011250636 HC RX REV CODE- 250/636: Performed by: INTERNAL MEDICINE

## 2021-08-26 PROCEDURE — 93005 ELECTROCARDIOGRAM TRACING: CPT

## 2021-08-26 PROCEDURE — 71045 X-RAY EXAM CHEST 1 VIEW: CPT

## 2021-08-26 PROCEDURE — 81001 URINALYSIS AUTO W/SCOPE: CPT

## 2021-08-26 PROCEDURE — 36415 COLL VENOUS BLD VENIPUNCTURE: CPT

## 2021-08-26 PROCEDURE — 85025 COMPLETE CBC W/AUTO DIFF WBC: CPT

## 2021-08-26 PROCEDURE — 84484 ASSAY OF TROPONIN QUANT: CPT

## 2021-08-26 PROCEDURE — 74011250636 HC RX REV CODE- 250/636: Performed by: EMERGENCY MEDICINE

## 2021-08-26 RX ORDER — GABAPENTIN 300 MG/1
900 CAPSULE ORAL EVERY MORNING
COMMUNITY

## 2021-08-26 RX ORDER — ACETAMINOPHEN 500 MG
1000 TABLET ORAL
Status: COMPLETED | OUTPATIENT
Start: 2021-08-26 | End: 2021-08-26

## 2021-08-26 RX ORDER — SODIUM CHLORIDE 0.9 % (FLUSH) 0.9 %
5-40 SYRINGE (ML) INJECTION AS NEEDED
Status: DISCONTINUED | OUTPATIENT
Start: 2021-08-26 | End: 2021-09-11 | Stop reason: HOSPADM

## 2021-08-26 RX ORDER — ACETAMINOPHEN 650 MG/1
650 SUPPOSITORY RECTAL
Status: DISCONTINUED | OUTPATIENT
Start: 2021-08-26 | End: 2021-09-11 | Stop reason: HOSPADM

## 2021-08-26 RX ORDER — ESCITALOPRAM OXALATE 20 MG/1
20 TABLET ORAL DAILY
COMMUNITY
Start: 2021-08-02

## 2021-08-26 RX ORDER — NITROGLYCERIN 80 MG/1
1 PATCH TRANSDERMAL DAILY
COMMUNITY

## 2021-08-26 RX ORDER — SODIUM CHLORIDE 0.9 % (FLUSH) 0.9 %
5-40 SYRINGE (ML) INJECTION EVERY 8 HOURS
Status: DISCONTINUED | OUTPATIENT
Start: 2021-08-26 | End: 2021-09-11 | Stop reason: HOSPADM

## 2021-08-26 RX ORDER — FAMOTIDINE 40 MG/1
40 TABLET, FILM COATED ORAL
COMMUNITY

## 2021-08-26 RX ORDER — SODIUM CHLORIDE 0.9 % (FLUSH) 0.9 %
5-10 SYRINGE (ML) INJECTION AS NEEDED
Status: DISCONTINUED | OUTPATIENT
Start: 2021-08-26 | End: 2021-09-11 | Stop reason: HOSPADM

## 2021-08-26 RX ORDER — ALBUTEROL SULFATE 1.25 MG/3ML
1.25 SOLUTION RESPIRATORY (INHALATION)
COMMUNITY
Start: 2021-08-12

## 2021-08-26 RX ORDER — ACETAMINOPHEN 325 MG/1
650 TABLET ORAL
Status: DISCONTINUED | OUTPATIENT
Start: 2021-08-26 | End: 2021-09-11 | Stop reason: HOSPADM

## 2021-08-26 RX ORDER — GABAPENTIN 300 MG/1
600 CAPSULE ORAL
COMMUNITY

## 2021-08-26 RX ORDER — QUETIAPINE FUMARATE 100 MG/1
100 TABLET, FILM COATED ORAL 2 TIMES DAILY
COMMUNITY
Start: 2021-08-03

## 2021-08-26 RX ORDER — AMLODIPINE BESYLATE 5 MG/1
5 TABLET ORAL DAILY
COMMUNITY
Start: 2021-08-18

## 2021-08-26 RX ORDER — METOPROLOL SUCCINATE 25 MG/1
25 TABLET, EXTENDED RELEASE ORAL DAILY
COMMUNITY

## 2021-08-26 RX ORDER — POLYETHYLENE GLYCOL 3350 17 G/17G
17 POWDER, FOR SOLUTION ORAL DAILY PRN
Status: DISCONTINUED | OUTPATIENT
Start: 2021-08-26 | End: 2021-09-11 | Stop reason: HOSPADM

## 2021-08-26 RX ORDER — RANITIDINE 150 MG/1
150 CAPSULE ORAL AT BEDTIME
COMMUNITY
End: 2021-08-27

## 2021-08-26 RX ORDER — ERGOCALCIFEROL 1.25 MG/1
50000 CAPSULE ORAL
COMMUNITY
End: 2021-09-11

## 2021-08-26 RX ORDER — INSULIN ASPART 100 [IU]/ML
INJECTION, SOLUTION INTRAVENOUS; SUBCUTANEOUS
COMMUNITY

## 2021-08-26 RX ORDER — ALBUTEROL SULFATE 90 UG/1
4 AEROSOL, METERED RESPIRATORY (INHALATION)
Status: COMPLETED | OUTPATIENT
Start: 2021-08-26 | End: 2021-08-26

## 2021-08-26 RX ORDER — ALBUTEROL SULFATE 90 UG/1
2 AEROSOL, METERED RESPIRATORY (INHALATION)
COMMUNITY

## 2021-08-26 RX ADMIN — AZITHROMYCIN MONOHYDRATE 500 MG: 500 INJECTION, POWDER, LYOPHILIZED, FOR SOLUTION INTRAVENOUS at 20:33

## 2021-08-26 RX ADMIN — METHYLPREDNISOLONE SODIUM SUCCINATE 60 MG: 125 INJECTION, POWDER, FOR SOLUTION INTRAMUSCULAR; INTRAVENOUS at 21:00

## 2021-08-26 RX ADMIN — SODIUM CHLORIDE 1000 ML: 9 INJECTION, SOLUTION INTRAVENOUS at 19:48

## 2021-08-26 RX ADMIN — ACETAMINOPHEN 1000 MG: 500 TABLET, FILM COATED ORAL at 20:01

## 2021-08-26 RX ADMIN — ALBUTEROL SULFATE 4 PUFF: 90 AEROSOL, METERED RESPIRATORY (INHALATION) at 21:01

## 2021-08-26 RX ADMIN — SODIUM CHLORIDE 1000 ML: 9 INJECTION, SOLUTION INTRAVENOUS at 20:27

## 2021-08-26 RX ADMIN — WATER 1 G: 1 INJECTION INTRAMUSCULAR; INTRAVENOUS; SUBCUTANEOUS at 20:28

## 2021-08-26 RX ADMIN — PHENYLEPHRINE HYDROCHLORIDE 30 MCG/MIN: 10 INJECTION INTRAVENOUS at 23:20

## 2021-08-26 NOTE — ED PROVIDER NOTES
Pt arrives via wheelchair with niece. Pt has advanced dementia per niece, but pt has more confusion than normal. Pt began with fever yesterday, has had balance issues for about a week, normally walks without assistance. Per niece, pt has grabbed her chest and screamed out in pain since yesterday, but then denies pain when asked. Urine output is lower than normal, pt's intake is normal, 4-5 liters of water per day, per niece, pt drinks out of a cup from the hospital    79year-old female presenting ER with report of increased confusion with fever at home. Patient also reportedly grabbing at her chest intermittently complain of pain. Family member also reports patient has had urine output. Patient has reported history of COPD, diabetes, hypertension. Patient's oxygen saturation initially 91%. Increased respiratory rate. No respiratory distress. Patient denies any chest pain at this time. No abdominal pain no reported nausea or vomiting. No report of rash. Patient is poor and vague historian. Majority history obtained from medical records and family member. Past Medical History:   Diagnosis Date    Chronic obstructive pulmonary disease ([address]     Diabetes ([facility] [address]     Diverticulitis     DVT (deep venous thrombosis) (HCC)     History of heart artery stent     Hypertension     Hypothyroid     Renal failure        Past Surgical History:   Procedure Laterality Date    HX APPENDECTOMY      HX CYST REMOVAL      HX HYSTERECTOMY      HX IMPLANTABLE CARDIOVERTER DEFIBRILLATOR      HX PACEMAKER           History reviewed. No pertinent family history.     Social History     Socioeconomic History    Marital status:      Spouse name: Not on file    Number of children: Not on file    Years of education: Not on file    Highest education level: Not on file   Occupational History    Not on file   Tobacco Use    Smoking status: Never Smoker    Smokeless tobacco: Never Used   Substance and Sexual Activity    Alcohol use: No    Drug use: Never    Sexual activity: Not on file   Other Topics Concern    Not on file   Social History Narrative    Not on file     Social Determinants of Health     Financial Resource Strain:     Difficulty of Paying Living Expenses:    Food Insecurity:     Worried About Running Out of Food in the Last Year:     [address] in the Last Year:    Transportation Needs:     Lack of Transportation (Medical):  Lack of Transportation (Non-Medical):    Physical Activity:     Days of Exercise per Week:     Minutes of Exercise per Session:    Stress:     Feeling of Stress :    Social Connections:     Frequency of Communication with Friends and Family:     Frequency of Social Gatherings with Friends and Family:     Attends [employer] Services:     Active Member of Clubs or Organizations:     Attends Club or Organization Meetings:     Marital Status:    Intimate Partner Violence:     Fear of Current or Ex-Partner:     Emotionally Abused:     Physically Abused:     Sexually Abused: ALLERGIES: Codeine    Review of Systems   Unable to perform ROS: Dementia   Constitutional: Positive for chills, fatigue and fever. Respiratory: Positive for cough and shortness of breath. Psychiatric/Behavioral: Positive for confusion. Vitals:    08/26/21 2315 08/26/21 2330 08/26/21 2345 08/27/21 0001   BP: (!) 103/38 (!) 125/59 (!) 126/50 (!) 154/57   Pulse: (!) 58 (!) 55 (!) 55 61   Resp: 21 22 21 27   Temp:       SpO2: 93% 94% 95% (!) 89%   Weight:                Physical Exam  Constitutional:       Appearance: She is well-developed. HENT:      Head: Normocephalic. Mouth/Throat:      Mouth: Mucous membranes are dry. Eyes:      Conjunctiva/sclera: Conjunctivae normal.   Cardiovascular:      Rate and Rhythm: Regular rhythm. Bradycardia present. Pulmonary:      Effort: Pulmonary effort is normal. Tachypnea present. No respiratory distress. Breath sounds: Rhonchi present. Abdominal:      General: Bowel sounds are normal.      Palpations: Abdomen is soft. Tenderness: There is no abdominal tenderness. Musculoskeletal:         General: Normal range of motion. Cervical back: Normal range of motion and neck supple. Skin:     General: Skin is warm. Capillary Refill: Capillary refill takes less than 2 seconds. Findings: No rash. Neurological:      Mental Status: She is alert. She is disoriented.       Comments: No gross motor or sensory deficits          MDM  Number of Diagnoses or Management Options  Chronic kidney disease, unspecified CKD stage  Metabolic encephalopathy  Pneumonia of both lungs due to infectious organism, unspecified part of lung  Sepsis, due to unspecified organism, unspecified whether acute organ dysfunction present ([facility] [address]  Type 2 diabetes mellitus with hyperglycemia, with long-term current use of insulin ([facility] [address]  Diagnosis management comments: IMPRESSION:  Sepsis, due to unspecified organism, unspecified whether acute organ dysfunction present ([facility] [address]  (primary encounter diagnosis)  Pneumonia of both lungs due to infectious organism, unspecified part of lung   Type 2 diabetes mellitus with hyperglycemia, with long-term current use of insulin (HCC)  Metabolic encephalopathy  Chronic kidney disease, unspecified CKD stage    - Broad Spectrum Antibiotics ordered: Ceftriaxone and Azithromycin  - Repeat lactic acid ordered for time 2100  - Re-assessment performed at time 2200 and clinical condition worsening.    - Hypotension or Lactic Acidosis present (SBP<90, MAP<65, Lactate >4): YES IVF:  30cc/kg actual Body Weight  - Persistent Hypotension despite IVF resuscitation: YES  Vasopressors: Norepinephrine    Plan:  Admit to ICU    Total critical care time spent exclusive of procedures:  45 minutes    Tamie Carpio MD           Amount and/or Complexity of Data Reviewed  Clinical lab tests: reviewed  Tests in the radiology section of CPT®: reviewed  Tests in the medicine section of CPT®: reviewed      ED Course as of Aug 27 0055   Thu Aug 26, 2021   1915 Temp(!): 101.8 °F (38.8 °C) [ZD]   1915 O2 Sat (%): 90 % [ZD]   1927 WBC(!): 20.2 [ZD]   1939 EKG atrial sensed ventricular paced rhythm. Left axis, left bundle branch block pattern, no signs of ischemia. EKG interpreted by Magdaleno Aguillon MD      [ZD]   4858 Spoke with family. And the power of . They do not want a central line. They are okay with peripheral pressors at this time. [ZD]      ED Course User Index  [ZD] Jimbo Velasquez MD       Procedures               Perfect Serve Consult for Admission  8:30 PM    ED Room Number: WER03/03  Patient Name and age:  Soraida Suarez 79 y.o.  female  Working Diagnosis:   1. Sepsis, due to unspecified organism, unspecified whether acute organ dysfunction present ([facility] [address]    2. Pneumonia of both lungs due to infectious organism, unspecified part of lung    3. Type 2 diabetes mellitus with hyperglycemia, with long-term current use of insulin ([facility] [address]    4. Metabolic encephalopathy    5.  Chronic kidney disease, unspecified CKD stage        COVID-19 Suspicion:  Other pending Rapid   Sepsis present:  yes  Reassessment needed: no  Code Status:  Do Not Resuscitate  Readmission: no  Isolation Requirements:  Other  Recommended Level of Care:  telemetry  Department:[place] [facility] - [phone]  Other:      Recent Results (from the past 24 hour(s))   EKG, 12 LEAD, INITIAL    Collection Time: 08/26/21  6:48 PM   Result Value Ref Range    Ventricular Rate 69 BPM    Atrial Rate 69 BPM    P-R Interval 196 ms    QRS Duration 162 ms    Q-T Interval 438 ms    QTC Calculation (Bezet) 469 ms    Calculated P Axis 12 degrees    Calculated R Axis -81 degrees    Calculated T Axis 87 degrees    Diagnosis       Atrial-sensed ventricular-paced rhythm  Abnormal ECG  When compared with ECG of 03-DEC-2018 23:13,  premature ventricular complexes are no longer present  Vent. rate has decreased BY   7 BPM     CBC WITH AUTOMATED DIFF    Collection Time: 08/26/21  6:56 PM   Result Value Ref Range    WBC 20.2 (H) 3.6 - 11.0 K/uL    RBC 3.70 (L) 3.80 - 5.20 M/uL    HGB 10.9 (L) 11.5 - 16.0 g/dL    HCT 33.5 (L) 35.0 - 47.0 %    MCV 90.5 80.0 - 99.0 FL    MCH 29.5 26.0 - 34.0 PG    MCHC 32.5 30.0 - 36.5 g/dL    RDW 14.4 11.5 - 14.5 %    PLATELET 216 (L) 942 - 400 K/uL    NRBC 0.0 0.0  WBC    ABSOLUTE NRBC 0.00 0.00 - 0.01 K/uL    NEUTROPHILS 84 (H) 32 - 75 %    LYMPHOCYTES 8 (L) 12 - 49 %    MONOCYTES 6 5 - 13 %    EOSINOPHILS 1 0 - 7 %    BASOPHILS 0 0 - 1 %    IMMATURE GRANULOCYTES 1 (H) 0 - 0.5 %    ABS. NEUTROPHILS 17.0 (H) 1.8 - 8.0 K/UL    ABS. LYMPHOCYTES 1.6 0.8 - 3.5 K/UL    ABS. MONOCYTES 1.2 (H) 0.0 - 1.0 K/UL    ABS. EOSINOPHILS 0.2 0.0 - 0.4 K/UL    ABS. BASOPHILS 0.1 0.0 - 0.1 K/UL    ABS. IMM. GRANS. 0.1 (H) 0.00 - 0.04 K/UL    DF AUTOMATED     TROPONIN I    Collection Time: 08/26/21  6:56 PM   Result Value Ref Range    Troponin-I, Qt. <0.05 <0.59 ng/mL   METABOLIC PANEL, COMPREHENSIVE    Collection Time: 08/26/21  6:56 PM   Result Value Ref Range    Sodium 141 136 - 145 mmol/L    Potassium 4.8 3.5 - 5.1 mmol/L    Chloride 106 97 - 108 mmol/L    CO2 25 21 - 32 mmol/L    Anion gap 10 5 - 15 mmol/L    Glucose 295 (H) 65 - 100 mg/dL    BUN 45 (H) 6 - 20 MG/DL    Creatinine 2.42 (H) 0.55 - 1.02 MG/DL    BUN/Creatinine ratio 19 12 - 20      GFR est AA 23 (L) >60 ml/min/1.73m2    GFR est non-AA 19 (L) >60 ml/min/1.73m2    Calcium 9.0 8.5 - 10.1 MG/DL    Bilirubin, total 1.1 (H) 0.2 - 1.0 MG/DL    ALT (SGPT) 13 12 - 78 U/L    AST (SGOT) 10 (L) 15 - 37 U/L    Alk.  phosphatase 63 45 - 117 U/L    Protein, total 7.0 6.4 - 8.2 g/dL    Albumin 3.2 (L) 3.5 - 5.0 g/dL    Globulin 3.8 2.0 - 4.0 g/dL    A-G Ratio 0.8 (L) 1.1 - 2.2     SAMPLES BEING HELD    Collection Time: 08/26/21  6:56 PM   Result Value Ref Range    SAMPLES BEING HELD 1 RED 1 SST 1 BLUE 1 BC     COMMENT        Add-on orders for these samples will be processed based on acceptable specimen integrity and analyte stability, which may vary by analyte. POC LACTIC ACID    Collection Time: 08/26/21  7:26 PM   Result Value Ref Range    Lactic Acid (POC) 2.60 (HH) 0.40 - 2.00 mmol/L   LACTIC ACID    Collection Time: 08/26/21  7:44 PM   Result Value Ref Range    Lactic acid 2.0 0.4 - 2.0 MMOL/L   URINALYSIS W/MICROSCOPIC    Collection Time: 08/26/21  8:02 PM   Result Value Ref Range    Color YELLOW/STRAW      Appearance CLEAR CLEAR      Specific gravity 1.020 1.003 - 1.030      pH (UA) 6.0 5.0 - 8.0      Protein 100 (A) NEG mg/dL    Glucose 250 (A) NEG mg/dL    Ketone Negative NEG mg/dL    Bilirubin Negative NEG      Blood Negative NEG      Urobilinogen 0.2 0.2 - 1.0 EU/dL    Nitrites Negative NEG      Leukocyte Esterase Negative NEG      WBC 0-4 0 - 4 /hpf    RBC 0-5 0 - 5 /hpf    Epithelial cells FEW FEW /lpf    Bacteria Negative NEG /hpf    Amorphous Crystals FEW (A) NEG     URINE CULTURE HOLD SAMPLE    Collection Time: 08/26/21  8:02 PM    Specimen: Serum; Urine   Result Value Ref Range    Urine culture hold        Urine on hold in Microbiology dept for 2 days. If unpreserved urine is submitted, it cannot be used for addtional testing after 24 hours, recollection will be required. COVID-19 RAPID TEST    Collection Time: 08/26/21  8:02 PM   Result Value Ref Range    Specimen source Nasopharyngeal      COVID-19 rapid test Not detected NOTD         XR CHEST PORT    Result Date: 8/26/2021  INDICATION: fever, cough EXAM:  AP CHEST RADIOGRAPH COMPARISON: December 4, 2018 FINDINGS: AP portable view of the chest demonstrates right subclavian pacemaker. Heart size is normal. Patchy airspace disease throughout the right lung and left lung base. No pneumothorax. The osseous structures are unremarkable. Patchy bilateral airspace disease.

## 2021-08-26 NOTE — ED TRIAGE NOTES
Pt arrives via wheelchair with niece. Pt has advanced dementia per niece, but pt has more confusion than normal. Pt began with fever yesterday, has had balance issues for about a week, normally walks without assistance. Per niece, pt has grabbed her chest and screamed out in pain since yesterday, but then denies pain when asked.  Urine output is lower than normal, pt's intake is normal, 4-5 liters of water per day, per niece, pt drinks out of a cup from the hospital.

## 2021-08-27 PROBLEM — D69.6 THROMBOCYTOPENIA (HCC): Status: ACTIVE | Noted: 2021-08-27

## 2021-08-27 LAB
ALBUMIN SERPL-MCNC: 3 G/DL (ref 3.5–5)
ALBUMIN/GLOB SERPL: 0.8 {RATIO} (ref 1.1–2.2)
ALP SERPL-CCNC: 59 U/L (ref 45–117)
ALT SERPL-CCNC: 13 U/L (ref 12–78)
ANION GAP SERPL CALC-SCNC: 9 MMOL/L (ref 5–15)
AST SERPL-CCNC: 11 U/L (ref 15–37)
ATRIAL RATE: 55 BPM
ATRIAL RATE: 69 BPM
B PERT DNA SPEC QL NAA+PROBE: NOT DETECTED
BASOPHILS # BLD: 0 K/UL (ref 0–0.1)
BASOPHILS NFR BLD: 0 % (ref 0–1)
BILIRUB SERPL-MCNC: 0.6 MG/DL (ref 0.2–1)
BORDETELLA PARAPERTUSSIS PCR, BORPAR: NOT DETECTED
BUN SERPL-MCNC: 41 MG/DL (ref 6–20)
BUN/CREAT SERPL: 19 (ref 12–20)
C PNEUM DNA SPEC QL NAA+PROBE: NOT DETECTED
CALCIUM SERPL-MCNC: 8.3 MG/DL (ref 8.5–10.1)
CALCULATED P AXIS, ECG09: 12 DEGREES
CALCULATED R AXIS, ECG10: -81 DEGREES
CALCULATED R AXIS, ECG10: -91 DEGREES
CALCULATED T AXIS, ECG11: 115 DEGREES
CALCULATED T AXIS, ECG11: 87 DEGREES
CHLORIDE SERPL-SCNC: 111 MMOL/L (ref 97–108)
CO2 SERPL-SCNC: 24 MMOL/L (ref 21–32)
CREAT SERPL-MCNC: 2.14 MG/DL (ref 0.55–1.02)
DIAGNOSIS, 93000: NORMAL
DIAGNOSIS, 93000: NORMAL
DIFFERENTIAL METHOD BLD: ABNORMAL
EOSINOPHIL # BLD: 0 K/UL (ref 0–0.4)
EOSINOPHIL NFR BLD: 0 % (ref 0–7)
ERYTHROCYTE [DISTWIDTH] IN BLOOD BY AUTOMATED COUNT: 14.6 % (ref 11.5–14.5)
FLUAV H1 2009 PAND RNA SPEC QL NAA+PROBE: NOT DETECTED
FLUAV H1 RNA SPEC QL NAA+PROBE: NOT DETECTED
FLUAV H3 RNA SPEC QL NAA+PROBE: NOT DETECTED
FLUAV SUBTYP SPEC NAA+PROBE: NOT DETECTED
FLUBV RNA SPEC QL NAA+PROBE: NOT DETECTED
GLOBULIN SER CALC-MCNC: 3.8 G/DL (ref 2–4)
GLUCOSE BLD STRIP.AUTO-MCNC: 128 MG/DL (ref 65–117)
GLUCOSE BLD STRIP.AUTO-MCNC: 227 MG/DL (ref 65–117)
GLUCOSE SERPL-MCNC: 218 MG/DL (ref 65–100)
HADV DNA SPEC QL NAA+PROBE: NOT DETECTED
HCOV 229E RNA SPEC QL NAA+PROBE: NOT DETECTED
HCOV HKU1 RNA SPEC QL NAA+PROBE: NOT DETECTED
HCOV NL63 RNA SPEC QL NAA+PROBE: NOT DETECTED
HCOV OC43 RNA SPEC QL NAA+PROBE: NOT DETECTED
HCT VFR BLD AUTO: 32.5 % (ref 35–47)
HGB BLD-MCNC: 10.4 G/DL (ref 11.5–16)
HMPV RNA SPEC QL NAA+PROBE: NOT DETECTED
HPIV1 RNA SPEC QL NAA+PROBE: NOT DETECTED
HPIV2 RNA SPEC QL NAA+PROBE: NOT DETECTED
HPIV3 RNA SPEC QL NAA+PROBE: NOT DETECTED
HPIV4 RNA SPEC QL NAA+PROBE: NOT DETECTED
IMM GRANULOCYTES # BLD AUTO: 0 K/UL
IMM GRANULOCYTES NFR BLD AUTO: 0 %
LACTATE SERPL-SCNC: 1.3 MMOL/L (ref 0.4–2)
LYMPHOCYTES # BLD: 1.6 K/UL (ref 0.8–3.5)
LYMPHOCYTES NFR BLD: 8 % (ref 12–49)
M PNEUMO DNA SPEC QL NAA+PROBE: NOT DETECTED
MAGNESIUM SERPL-MCNC: 1.6 MG/DL (ref 1.6–2.4)
MCH RBC QN AUTO: 29.5 PG (ref 26–34)
MCHC RBC AUTO-ENTMCNC: 32 G/DL (ref 30–36.5)
MCV RBC AUTO: 92.3 FL (ref 80–99)
MONOCYTES # BLD: 0 K/UL (ref 0–1)
MONOCYTES NFR BLD: 0 % (ref 5–13)
NEUTS BAND NFR BLD MANUAL: 3 % (ref 0–6)
NEUTS SEG # BLD: 18.1 K/UL (ref 1.8–8)
NEUTS SEG NFR BLD: 89 % (ref 32–75)
NRBC # BLD: 0 K/UL (ref 0–0.01)
NRBC BLD-RTO: 0 PER 100 WBC
P-R INTERVAL, ECG05: 196 MS
PHOSPHATE SERPL-MCNC: 3.1 MG/DL (ref 2.6–4.7)
PLATELET # BLD AUTO: 97 K/UL (ref 150–400)
PMV BLD AUTO: 13.8 FL (ref 8.9–12.9)
POTASSIUM SERPL-SCNC: 4.5 MMOL/L (ref 3.5–5.1)
PROCALCITONIN SERPL-MCNC: 12.29 NG/ML
PROT SERPL-MCNC: 6.8 G/DL (ref 6.4–8.2)
Q-T INTERVAL, ECG07: 438 MS
Q-T INTERVAL, ECG07: 662 MS
QRS DURATION, ECG06: 162 MS
QRS DURATION, ECG06: 170 MS
QTC CALCULATION (BEZET), ECG08: 469 MS
QTC CALCULATION (BEZET), ECG08: 633 MS
RBC # BLD AUTO: 3.52 M/UL (ref 3.8–5.2)
RBC MORPH BLD: ABNORMAL
RBC MORPH BLD: ABNORMAL
RSV RNA SPEC QL NAA+PROBE: NOT DETECTED
RV+EV RNA SPEC QL NAA+PROBE: NOT DETECTED
SARS-COV-2 PCR, COVPCR: NOT DETECTED
SERVICE CMNT-IMP: ABNORMAL
SERVICE CMNT-IMP: ABNORMAL
SODIUM SERPL-SCNC: 144 MMOL/L (ref 136–145)
VENTRICULAR RATE, ECG03: 55 BPM
VENTRICULAR RATE, ECG03: 69 BPM
WBC # BLD AUTO: 19.7 K/UL (ref 3.6–11)

## 2021-08-27 PROCEDURE — 74011000258 HC RX REV CODE- 258: Performed by: FAMILY MEDICINE

## 2021-08-27 PROCEDURE — 93005 ELECTROCARDIOGRAM TRACING: CPT

## 2021-08-27 PROCEDURE — 65660000000 HC RM CCU STEPDOWN

## 2021-08-27 PROCEDURE — 86738 MYCOPLASMA ANTIBODY: CPT

## 2021-08-27 PROCEDURE — 83735 ASSAY OF MAGNESIUM: CPT

## 2021-08-27 PROCEDURE — 77030013140 HC MSK NEB VYRM -A

## 2021-08-27 PROCEDURE — 85025 COMPLETE CBC W/AUTO DIFF WBC: CPT

## 2021-08-27 PROCEDURE — 80053 COMPREHEN METABOLIC PANEL: CPT

## 2021-08-27 PROCEDURE — 0202U NFCT DS 22 TRGT SARS-COV-2: CPT

## 2021-08-27 PROCEDURE — 83605 ASSAY OF LACTIC ACID: CPT

## 2021-08-27 PROCEDURE — 87899 AGENT NOS ASSAY W/OPTIC: CPT

## 2021-08-27 PROCEDURE — 84145 PROCALCITONIN (PCT): CPT

## 2021-08-27 PROCEDURE — 87449 NOS EACH ORGANISM AG IA: CPT

## 2021-08-27 PROCEDURE — 74011250636 HC RX REV CODE- 250/636: Performed by: INTERNAL MEDICINE

## 2021-08-27 PROCEDURE — 77030038269 HC DRN EXT URIN PURWCK BARD -A

## 2021-08-27 PROCEDURE — 84100 ASSAY OF PHOSPHORUS: CPT

## 2021-08-27 PROCEDURE — 74011000250 HC RX REV CODE- 250: Performed by: INTERNAL MEDICINE

## 2021-08-27 PROCEDURE — 82962 GLUCOSE BLOOD TEST: CPT

## 2021-08-27 PROCEDURE — 36415 COLL VENOUS BLD VENIPUNCTURE: CPT

## 2021-08-27 PROCEDURE — 74011250636 HC RX REV CODE- 250/636

## 2021-08-27 PROCEDURE — 74011636637 HC RX REV CODE- 636/637: Performed by: INTERNAL MEDICINE

## 2021-08-27 PROCEDURE — 74011250636 HC RX REV CODE- 250/636: Performed by: FAMILY MEDICINE

## 2021-08-27 RX ORDER — HALOPERIDOL 5 MG/ML
INJECTION INTRAMUSCULAR
Status: COMPLETED
Start: 2021-08-27 | End: 2021-08-27

## 2021-08-27 RX ORDER — MAGNESIUM SULFATE 100 %
4 CRYSTALS MISCELLANEOUS AS NEEDED
Status: DISCONTINUED | OUTPATIENT
Start: 2021-08-27 | End: 2021-09-06

## 2021-08-27 RX ORDER — SODIUM CHLORIDE 9 MG/ML
50 INJECTION, SOLUTION INTRAVENOUS CONTINUOUS
Status: DISCONTINUED | OUTPATIENT
Start: 2021-08-27 | End: 2021-08-31

## 2021-08-27 RX ORDER — HALOPERIDOL 5 MG/ML
1 INJECTION INTRAMUSCULAR
Status: DISCONTINUED | OUTPATIENT
Start: 2021-08-27 | End: 2021-09-11 | Stop reason: HOSPADM

## 2021-08-27 RX ORDER — HEPARIN SODIUM 5000 [USP'U]/ML
5000 INJECTION, SOLUTION INTRAVENOUS; SUBCUTANEOUS EVERY 8 HOURS
Status: DISCONTINUED | OUTPATIENT
Start: 2021-08-27 | End: 2021-09-06

## 2021-08-27 RX ORDER — PEPPERMINT OIL
OIL (ML) MISCELLANEOUS ONCE
Status: DISPENSED | OUTPATIENT
Start: 2021-08-27 | End: 2021-08-28

## 2021-08-27 RX ORDER — INSULIN LISPRO 100 [IU]/ML
INJECTION, SOLUTION INTRAVENOUS; SUBCUTANEOUS
Status: DISCONTINUED | OUTPATIENT
Start: 2021-08-27 | End: 2021-09-06

## 2021-08-27 RX ORDER — DEXTROSE 50 % IN WATER (D50W) INTRAVENOUS SYRINGE
12.5-25 AS NEEDED
Status: DISCONTINUED | OUTPATIENT
Start: 2021-08-27 | End: 2021-09-06

## 2021-08-27 RX ADMIN — SODIUM CHLORIDE 75 ML/HR: 9 INJECTION, SOLUTION INTRAVENOUS at 18:32

## 2021-08-27 RX ADMIN — DOXYCYCLINE 100 MG: 100 INJECTION, POWDER, LYOPHILIZED, FOR SOLUTION INTRAVENOUS at 21:20

## 2021-08-27 RX ADMIN — HALOPERIDOL LACTATE 1 MG: 5 INJECTION, SOLUTION INTRAMUSCULAR at 13:17

## 2021-08-27 RX ADMIN — INSULIN LISPRO 3 UNITS: 100 INJECTION, SOLUTION INTRAVENOUS; SUBCUTANEOUS at 08:28

## 2021-08-27 RX ADMIN — HALOPERIDOL 1 MG: 5 INJECTION INTRAMUSCULAR at 13:17

## 2021-08-27 RX ADMIN — SODIUM CHLORIDE 75 ML/HR: 9 INJECTION, SOLUTION INTRAVENOUS at 03:42

## 2021-08-27 RX ADMIN — Medication 10 ML: at 21:21

## 2021-08-27 RX ADMIN — HEPARIN SODIUM 5000 UNITS: 5000 INJECTION INTRAVENOUS; SUBCUTANEOUS at 05:25

## 2021-08-27 RX ADMIN — INSULIN HUMAN 15 UNITS: 100 INJECTION, SUSPENSION SUBCUTANEOUS at 08:28

## 2021-08-27 RX ADMIN — Medication 10 ML: at 05:25

## 2021-08-27 RX ADMIN — HEPARIN SODIUM 5000 UNITS: 5000 INJECTION INTRAVENOUS; SUBCUTANEOUS at 21:21

## 2021-08-27 RX ADMIN — CEFEPIME 2 G: 20 INJECTION, POWDER, FOR SOLUTION INTRAVENOUS at 03:42

## 2021-08-27 NOTE — ED NOTES
Patient is very agitated and very combative towards RN at this time. Patient found trying to climb out of stretcher. Patient refused BG check     MD oneill aware. Awaiting  new orders at this time. Will continue to monitor.

## 2021-08-27 NOTE — PROGRESS NOTES
8/27/2021  11:55 AM    Case management note    Reason for Admission:   Severe sepsis    Patient lives with niece and is normally ambulatory with out assistance. Patient came to hospital for increased confusion and loss of balance. Patient has severe dementia. She also has history of CAD, HTN, DM. CKD4, and COPD. [facility]    Niece wants to speak to hospice rep. Sent referral to [facility] for informative session. Left message for Med Assist to call family for financial screen. RUR Score:      25%             PCP: First and Last name:   Rajani Dougherty MD     Name of Practice:    Are you a current patient: Yes/No: yes   Approximate date of last visit: 3 weeks   Can you participate in a virtual visit if needed:     Do you (patient/family) have any concerns for transition/discharge? Patient is requiring constant watching and will need 24/7 care              Plan for utilizing home health:       Current Advanced Directive/Advance Care Plan:  DNR      Healthcare Decision Maker:   [facility]                 Transition of Care Plan:          1. Long term planning  2. PCP follow up  3. Hospice/ Palliative consult  4. CM to follow for discharge needs    Care Management Interventions  PCP Verified by CM:  Yes (Dr. Antony Monsalve)  Mode of Transport at Discharge: Self  Current Support Network: [address] Follow Up Transport: Family  The Plan for Transition of Care is Related to the Following Treatment Goals : severe sepsis  Discharge Location  Discharge Placement: Unable to determine at this time  [address]

## 2021-08-27 NOTE — PROGRESS NOTES
Daily Progress Note: 8/27/2021  Aubree Link, Jaimee Albarran MD    Assessment/Plan:   Severe sepsis / septic shock: 2/2 PNA. -CXR showed patchy bilateral airspace disease. Rapid COVID test negative. Check RVP/COVID-PCR.   -Start cefepime and azithromycin. -Given IVF boluses and was still hypotensive   -vasopressor as needed. - Follow cultures. -Check procalcitonin.   -Send sputum culture, PNA workup (Legionella and Strep Pneumo urine ag, Mycoplasma IgM)     Chronic obstructive pulmonary disease: no wheezing.   -Combivent PRN     Hypertension: now hypotensive.   -Holding Norvasc and metoprolol     Hypothyroid:   -check TSH. Continue LT4     CAD (coronary artery disease) / History of heart artery stent:   -cont DAPT, statin.   -Holding BB     Diabetes: type 2, associated w/ CKD4.   -Poor control.   -On determir at home.   -Use NPH here.      CKD (chronic kidney disease) stage 4, GFR 15-29 ml/min: stable. Follow BMP     Acute metabolic encephalopathy 2/2 sepsis.   -Has dementia and is at risk for delirium.   -Avoid benzos, opioids, anticholinergics. -Verbally re-direct whenever possible.   -Avoid chemical restraints unless pt is a danger to self or others.  -Haldol as needed      Thrombocytopenia: chronic, stable.    -Follow PLT     Code Status: DNR     Problem List:  Problem List as of 8/27/2021 Date Reviewed: 8/26/2021        Codes Class Noted - Resolved    Thrombocytopenia ([facility] ICD-10-CM: D69.6  ICD-9-CM: 287.5  8/27/2021 - Present        Chronic obstructive pulmonary disease (HCC) ICD-10-CM: J44.9  ICD-9-CM: 80  Unknown - Present        Hypertension ICD-10-CM: I10  ICD-9-CM: 401.9  Unknown - Present        Hypothyroid ICD-10-CM: E03.9  ICD-9-CM: 244.9  Unknown - Present        CAD (coronary artery disease) ICD-10-CM: I25.10  ICD-9-CM: 414.00  Unknown - Present        History of heart artery stent ICD-10-CM: Z95.5  ICD-9-CM: V45.82  Unknown - Present        Diabetes ([facility] ICD-10-CM: E11.9  ICD-9-CM: 250.00  Unknown - Present        CKD (chronic kidney disease) stage 4, GFR 15-29 ml/min (HCC) ICD-10-CM: N18.4  ICD-9-CM: 472. 4  Unknown - Present        Severe sepsis (HCC) ICD-10-CM: A41.9, R65.20  ICD-9-CM: 038.9, 995.92  Unknown - Present        Pneumonia ICD-10-CM: J18.9  ICD-9-CM: 757  Unknown - Present        Dementia ([facility] ICD-10-CM: F03.90  ICD-9-CM: 294.20  2021 - Present        Acute metabolic encephalopathy JNR-56-TM: G93.41  ICD-9-CM: 348.31  2021 - Present              Subjective:    79 y.o. female w/ hx of CAD, HTN, DM, CKD4, dementia presenting w/ fever, weakness. Started yesterday, febrile at home, associated with AMS and lethargy per family. Pt also c/o chest pain and cough. ED workup showed 3/4 SIRS with bilateral PNA evident on CXR. Ms. Sylvia Alvarez is admitted for further evaluation and management. (Dr Ayaan Beltran)    : She is confused this am. Not able to provide any history and family not present. Off pressors. BP stable.      Review of Systems:   Review of systems not obtained due to patient factors. Objective:   Physical Exam:     Visit Vitals  BP (!) 129/54   Pulse (!) 55   Temp 98.6 °F (37 °C)   Resp 12   Wt 69.4 kg (153 lb)   SpO2 96%   BMI 23.96 kg/m²    O2 Flow Rate (L/min): 2 l/min O2 Device: Nasal cannula  Temp (24hrs), Av.3 °F (37.4 °C), Min:97.8 °F (36.6 °C), Max:101.8 °F (38.8 °C)    1901 -  0700  In: 2250 [I.V.:2250]  Out: -    No intake/output data recorded. General:  Confused,  no distress, frail appearing. Head:  Normocephalic, without obvious abnormality, atraumatic. Eyes:  Conjunctivae/corneas clear   Nose: Nares normal. Septum midline. Mucosa normal. No drainage or sinus tenderness. Throat: Lips, mucosa, and tongue moist..   Neck: Supple, symmetrical, trachea midline, no adenopathy, thyroid: no enlargement/tenderness/nodules, no carotid bruit and no JVD. Back:   Symmetric, no curvature. ROM normal. No CVA tenderness. Lungs:   Clear to auscultation bilaterally. Chest wall:  No tenderness or deformity. Heart:  Regular rate and rhythm, S1, S2 normal, no murmur, click, rub or gallop. Abdomen:   Soft, non-tender. Bowel sounds normal. No masses,  No organomegaly. Extremities: no cyanosis or edema. No calf tenderness or cords. Pulses: 2+ and symmetric all extremities. Skin: Skin color, texture, turgor normal. No rashes or lesions   Neurologic: CNII-XII intact. Alert and oriented X 1. Fine motor of hands and fingers normal.   equal.  No cogwheeling or rigidity. Gait not tested at this time. Sensation grossly normal to touch. Gross motor of extremities normal.       Data Review:    Port CXR 8/27/21   INDICATION: fever, cough  EXAM:  AP CHEST RADIOGRAPH  COMPARISON: December 4, 2018  FINDINGS:  AP portable view of the chest demonstrates right subclavian pacemaker. Heart  size is normal. Patchy airspace disease throughout the right lung and left lung  base. No pneumothorax. The osseous structures are unremarkable. IMPRESSION  Patchy bilateral airspace disease. Recent Days:  Recent Labs     08/27/21  0144 08/26/21  1856   WBC 19.7* 20.2*   HGB 10.4* 10.9*   HCT 32.5* 33.5*   PLT 97* 105*     Recent Labs     08/27/21  0144 08/26/21  1856    141   K 4.5 4.8   * 106   CO2 24 25   * 295*   BUN 41* 45*   CREA 2.14* 2.42*   CA 8.3* 9.0   MG 1.6  --    PHOS 3.1  --    ALB 3.0* 3.2*   TBILI 0.6 1.1*   ALT 13 13     No results for input(s): PH, PCO2, PO2, HCO3, FIO2 in the last 72 hours.     24 Hour Results:  Recent Results (from the past 24 hour(s))   EKG, 12 LEAD, INITIAL    Collection Time: 08/26/21  6:48 PM   Result Value Ref Range    Ventricular Rate 69 BPM    Atrial Rate 69 BPM    P-R Interval 196 ms    QRS Duration 162 ms    Q-T Interval 438 ms    QTC Calculation (Bezet) 469 ms    Calculated P Axis 12 degrees    Calculated R Axis -81 degrees    Calculated T Axis 87 degrees    Diagnosis Atrial-sensed ventricular-paced rhythm  Abnormal ECG  When compared with ECG of 03-DEC-2018 23:13,  premature ventricular complexes are no longer present  Vent. rate has decreased BY   7 BPM     CBC WITH AUTOMATED DIFF    Collection Time: 08/26/21  6:56 PM   Result Value Ref Range    WBC 20.2 (H) 3.6 - 11.0 K/uL    RBC 3.70 (L) 3.80 - 5.20 M/uL    HGB 10.9 (L) 11.5 - 16.0 g/dL    HCT 33.5 (L) 35.0 - 47.0 %    MCV 90.5 80.0 - 99.0 FL    MCH 29.5 26.0 - 34.0 PG    MCHC 32.5 30.0 - 36.5 g/dL    RDW 14.4 11.5 - 14.5 %    PLATELET 010 (L) 031 - 400 K/uL    NRBC 0.0 0.0  WBC    ABSOLUTE NRBC 0.00 0.00 - 0.01 K/uL    NEUTROPHILS 84 (H) 32 - 75 %    LYMPHOCYTES 8 (L) 12 - 49 %    MONOCYTES 6 5 - 13 %    EOSINOPHILS 1 0 - 7 %    BASOPHILS 0 0 - 1 %    IMMATURE GRANULOCYTES 1 (H) 0 - 0.5 %    ABS. NEUTROPHILS 17.0 (H) 1.8 - 8.0 K/UL    ABS. LYMPHOCYTES 1.6 0.8 - 3.5 K/UL    ABS. MONOCYTES 1.2 (H) 0.0 - 1.0 K/UL    ABS. EOSINOPHILS 0.2 0.0 - 0.4 K/UL    ABS. BASOPHILS 0.1 0.0 - 0.1 K/UL    ABS. IMM. GRANS. 0.1 (H) 0.00 - 0.04 K/UL    DF AUTOMATED     TROPONIN I    Collection Time: 08/26/21  6:56 PM   Result Value Ref Range    Troponin-I, Qt. <0.05 <9.07 ng/mL   METABOLIC PANEL, COMPREHENSIVE    Collection Time: 08/26/21  6:56 PM   Result Value Ref Range    Sodium 141 136 - 145 mmol/L    Potassium 4.8 3.5 - 5.1 mmol/L    Chloride 106 97 - 108 mmol/L    CO2 25 21 - 32 mmol/L    Anion gap 10 5 - 15 mmol/L    Glucose 295 (H) 65 - 100 mg/dL    BUN 45 (H) 6 - 20 MG/DL    Creatinine 2.42 (H) 0.55 - 1.02 MG/DL    BUN/Creatinine ratio 19 12 - 20      GFR est AA 23 (L) >60 ml/min/1.73m2    GFR est non-AA 19 (L) >60 ml/min/1.73m2    Calcium 9.0 8.5 - 10.1 MG/DL    Bilirubin, total 1.1 (H) 0.2 - 1.0 MG/DL    ALT (SGPT) 13 12 - 78 U/L    AST (SGOT) 10 (L) 15 - 37 U/L    Alk.  phosphatase 63 45 - 117 U/L    Protein, total 7.0 6.4 - 8.2 g/dL    Albumin 3.2 (L) 3.5 - 5.0 g/dL    Globulin 3.8 2.0 - 4.0 g/dL    A-G Ratio 0.8 (L) 1.1 - 2.2 SAMPLES BEING HELD    Collection Time: 08/26/21  6:56 PM   Result Value Ref Range    SAMPLES BEING HELD 1 RED 1 SST 1 BLUE 1 BC     COMMENT        Add-on orders for these samples will be processed based on acceptable specimen integrity and analyte stability, which may vary by analyte. POC LACTIC ACID    Collection Time: 08/26/21  7:26 PM   Result Value Ref Range    Lactic Acid (POC) 2.60 (HH) 0.40 - 2.00 mmol/L   LACTIC ACID    Collection Time: 08/26/21  7:44 PM   Result Value Ref Range    Lactic acid 2.0 0.4 - 2.0 MMOL/L   URINALYSIS W/MICROSCOPIC    Collection Time: 08/26/21  8:02 PM   Result Value Ref Range    Color YELLOW/STRAW      Appearance CLEAR CLEAR      Specific gravity 1.020 1.003 - 1.030      pH (UA) 6.0 5.0 - 8.0      Protein 100 (A) NEG mg/dL    Glucose 250 (A) NEG mg/dL    Ketone Negative NEG mg/dL    Bilirubin Negative NEG      Blood Negative NEG      Urobilinogen 0.2 0.2 - 1.0 EU/dL    Nitrites Negative NEG      Leukocyte Esterase Negative NEG      WBC 0-4 0 - 4 /hpf    RBC 0-5 0 - 5 /hpf    Epithelial cells FEW FEW /lpf    Bacteria Negative NEG /hpf    Amorphous Crystals FEW (A) NEG     URINE CULTURE HOLD SAMPLE    Collection Time: 08/26/21  8:02 PM    Specimen: Serum; Urine   Result Value Ref Range    Urine culture hold        Urine on hold in Microbiology dept for 2 days. If unpreserved urine is submitted, it cannot be used for addtional testing after 24 hours, recollection will be required.    COVID-19 RAPID TEST    Collection Time: 08/26/21  8:02 PM   Result Value Ref Range    Specimen source Nasopharyngeal      COVID-19 rapid test Not detected NOTD     LACTIC ACID    Collection Time: 08/27/21  1:44 AM   Result Value Ref Range    Lactic acid 1.3 0.4 - 2.0 MMOL/L   METABOLIC PANEL, COMPREHENSIVE    Collection Time: 08/27/21  1:44 AM   Result Value Ref Range    Sodium 144 136 - 145 mmol/L    Potassium 4.5 3.5 - 5.1 mmol/L    Chloride 111 (H) 97 - 108 mmol/L    CO2 24 21 - 32 mmol/L Anion gap 9 5 - 15 mmol/L    Glucose 218 (H) 65 - 100 mg/dL    BUN 41 (H) 6 - 20 MG/DL    Creatinine 2.14 (H) 0.55 - 1.02 MG/DL    BUN/Creatinine ratio 19 12 - 20      GFR est AA 27 (L) >60 ml/min/1.73m2    GFR est non-AA 22 (L) >60 ml/min/1.73m2    Calcium 8.3 (L) 8.5 - 10.1 MG/DL    Bilirubin, total 0.6 0.2 - 1.0 MG/DL    ALT (SGPT) 13 12 - 78 U/L    AST (SGOT) 11 (L) 15 - 37 U/L    Alk. phosphatase 59 45 - 117 U/L    Protein, total 6.8 6.4 - 8.2 g/dL    Albumin 3.0 (L) 3.5 - 5.0 g/dL    Globulin 3.8 2.0 - 4.0 g/dL    A-G Ratio 0.8 (L) 1.1 - 2.2     MAGNESIUM    Collection Time: 08/27/21  1:44 AM   Result Value Ref Range    Magnesium 1.6 1.6 - 2.4 mg/dL   CBC WITH AUTOMATED DIFF    Collection Time: 08/27/21  1:44 AM   Result Value Ref Range    WBC 19.7 (H) 3.6 - 11.0 K/uL    RBC 3.52 (L) 3.80 - 5.20 M/uL    HGB 10.4 (L) 11.5 - 16.0 g/dL    HCT 32.5 (L) 35.0 - 47.0 %    MCV 92.3 80.0 - 99.0 FL    MCH 29.5 26.0 - 34.0 PG    MCHC 32.0 30.0 - 36.5 g/dL    RDW 14.6 (H) 11.5 - 14.5 %    PLATELET 97 (L) 425 - 400 K/uL    MPV 13.8 (H) 8.9 - 12.9 FL    NRBC 0.0 0.0  WBC    ABSOLUTE NRBC 0.00 0.00 - 0.01 K/uL    NEUTROPHILS 89 (H) 32 - 75 %    BAND NEUTROPHILS 3 0 - 6 %    LYMPHOCYTES 8 (L) 12 - 49 %    MONOCYTES 0 (L) 5 - 13 %    EOSINOPHILS 0 0 - 7 %    BASOPHILS 0 0 - 1 %    IMMATURE GRANULOCYTES 0 %    ABS. NEUTROPHILS 18.1 (H) 1.8 - 8.0 K/UL    ABS. LYMPHOCYTES 1.6 0.8 - 3.5 K/UL    ABS. MONOCYTES 0.0 0.0 - 1.0 K/UL    ABS. EOSINOPHILS 0.0 0.0 - 0.4 K/UL    ABS. BASOPHILS 0.0 0.0 - 0.1 K/UL    ABS. IMM.  GRANS. 0.0 K/UL    DF AUTOMATED      RBC COMMENTS POIKILOCYTOSIS  1+        RBC COMMENTS OVALOCYTES  1+       PHOSPHORUS    Collection Time: 08/27/21  1:44 AM   Result Value Ref Range    Phosphorus 3.1 2.6 - 4.7 MG/DL   EKG, 12 LEAD, SUBSEQUENT    Collection Time: 08/27/21  3:55 AM   Result Value Ref Range    Ventricular Rate 55 BPM    Atrial Rate 55 BPM    QRS Duration 170 ms    Q-T Interval 662 ms    QTC Calculation (Bezet) 633 ms    Calculated R Axis -91 degrees    Calculated T Axis 115 degrees    Diagnosis       Ventricular-paced rhythm  Abnormal ECG  When compared with ECG of 26-AUG-2021 18:48,  MANUAL COMPARISON REQUIRED, DATA IS UNCONFIRMED         Medications reviewed  Current Facility-Administered Medications   Medication Dose Route Frequency    azithromycin (ZITHROMAX) 500 mg in 0.9% sodium chloride 250 mL (VIAL-MATE)  500 mg IntraVENous Q24H    cefepime (MAXIPIME) 2 g in sterile water (preservative free) 10 mL IV syringe  2 g IntraVENous Q24H    0.9% sodium chloride infusion  75 mL/hr IntraVENous CONTINUOUS    insulin lispro (HUMALOG) injection   SubCUTAneous AC&HS    glucose chewable tablet 16 g  4 Tablet Oral PRN    dextrose (D50W) injection syrg 12.5-25 g  12.5-25 g IntraVENous PRN    glucagon (GLUCAGEN) injection 1 mg  1 mg IntraMUSCular PRN    ipratropium-albuterol (COMBIVENT RESPIMAT) 20 mcg-100 mcg inhalation spray  1 Puff Inhalation Q6H PRN    insulin NPH (NOVOLIN N, HUMULIN N) injection 15 Units  15 Units SubCUTAneous ACB&D    heparin (porcine) injection 5,000 Units  5,000 Units SubCUTAneous Q8H    sodium chloride (NS) flush 5-10 mL  5-10 mL IntraVENous PRN    sodium chloride (NS) flush 5-40 mL  5-40 mL IntraVENous Q8H    sodium chloride (NS) flush 5-40 mL  5-40 mL IntraVENous PRN    acetaminophen (TYLENOL) tablet 650 mg  650 mg Oral Q6H PRN    Or    acetaminophen (TYLENOL) suppository 650 mg  650 mg Rectal Q6H PRN    polyethylene glycol (MIRALAX) packet 17 g  17 g Oral DAILY PRN    PHENYLephrine (CHIRAG-SYNEPHRINE) 30 mg in 0.9% sodium chloride 250 mL infusion   mcg/min IntraVENous TITRATE     Current Outpatient Medications   Medication Sig    ergocalciferol (Vitamin D2) 1,250 mcg (50,000 unit) capsule Take 50,000 Units by mouth every seven (7) days.  gabapentin (NEURONTIN) 300 mg capsule Take 900 mg by mouth Every morning.     gabapentin (NEURONTIN) 300 mg capsule Take 600 mg by mouth nightly.  insulin detemir U-100 (LEVEMIR FLEXTOUCH) 100 unit/mL (3 mL) inpn 30 Units by SubCUTAneous route two (2) times a day.  insulin aspart U-100 (NovoLOG Flexpen U-100 Insulin) 100 unit/mL (3 mL) inpn 4-12 Units by SubCUTAneous route Before breakfast, lunch, dinner and at bedtime.  metoprolol succinate (TOPROL-XL) 25 mg XL tablet Take 25 mg by mouth daily.  albuterol (PROVENTIL HFA, VENTOLIN HFA, PROAIR HFA) 90 mcg/actuation inhaler Take 2 Puffs by inhalation every four (4) hours as needed for Wheezing.  famotidine (PEPCID) 40 mg tablet Take 40 mg by mouth nightly.  escitalopram oxalate (LEXAPRO) 20 mg tablet Take 20 mg by mouth daily.  amLODIPine (NORVASC) 5 mg tablet Take 5 mg by mouth daily.  nitroglycerin (NITRODUR) 0.1 mg/hr nitroglycerin 0.1 mg/hr transdermal 24 hour patch   Apply 1 patch every day by transdermal route.  QUEtiapine (SEROquel) 100 mg tablet Take 100 mg by mouth two (2) times a day.  raNITIdine hcl 150 mg capsule Take 150 mg by mouth At bedtime.  levothyroxine (SYNTHROID) 50 mcg tablet Take 50 mcg by mouth Daily (before breakfast).  sodium bicarbonate 325 mg tablet Take 325 mg by mouth two (2) times a day.  cetirizine (ZYRTEC) 5 mg tablet Take 5 mg by mouth daily as needed.  montelukast (SINGULAIR) 10 mg tablet Take 10 mg by mouth daily.  spironolactone (ALDACTONE) 25 mg tablet Take 25 mg by mouth every other day.  clopidogrel (PLAVIX) 75 mg tablet Take 75 mg by mouth daily.  aspirin 81 mg chewable tablet Take 81 mg by mouth daily.  atorvastatin (LIPITOR) 20 mg tablet     albuterol sulfate (PROAIR RESPICLICK) 90 mcg/actuation breath activated inhaler albuterol sulfate 90 mcg/actuation breath activated powder inhaler   Inhale 2 puffs every 4 hours by inhalation route.     albuterol (ACCUNEB) 1.25 mg/3 mL nebu INHALE THE CONTENTS OF 1 VIAL VIA NEBULIZER EVERY 8 HOURS AS NEEDED    QUEtiapine (SEROQUEL) 25 mg tablet take 2 tablets by mouth twice a day    NITROSTAT 0.4 mg SL tablet  (Patient not taking: Reported on 8/26/2021)       Care Plan discussed with: Patient/Family and Nurse    Total time spent with patient: 30 minutes.     Tarsha Jim MD

## 2021-08-27 NOTE — ED NOTES
TRANSFER - OUT REPORT:    Verbal report given to [facility]EN IVERSON RN(name) on Wale Ovalles  being transferred to ED(unit) for routine progression of care       Report consisted of patients Situation, Background, Assessment and   Recommendations(SBAR). Information from the following report(s) ED Summary was reviewed with the receiving nurse. Lines:   Peripheral IV 08/26/21 Anterior; Left Forearm (Active)       Peripheral IV 08/26/21 Right Antecubital (Active)   Site Assessment Clean, dry, & intact 08/26/21 2019   Phlebitis Assessment 0 08/26/21 2019   Infiltration Assessment 0 08/26/21 2019   Dressing Status Clean, dry, & intact 08/26/21 2019   Action Taken Blood drawn 08/26/21 2019        Opportunity for questions and clarification was provided.       Patient transported with:   Monitor  O2 @ 2 liters   IV fluids and Olegario-synephrine

## 2021-08-27 NOTE — PROGRESS NOTES
Admission Medication Reconciliation:     Information obtained from:    Anson Eldridge   RxQuery data available¹:  YES    Comments/Recommendations:   Patient's family member is able to confirm name, , allergies, and preferred pharmacy  Updated PTA medication list  It is reported the patient's gabapentin was recently increased by the PCP to 900 mg in the morning and 600 mg at night. The increase occurred about two weeks ago. ¹RxQuery pharmacy benefit data reflects medications filled and processed through the patient's insurance, however   this data does NOT capture whether the medication was picked up or is currently being taken by the patient. Prior to Admission Medications   Prescriptions Last Dose Informant Taking? QUEtiapine (SEROquel) 100 mg tablet 2021 at Unknown time Self Yes   Sig: Take 100 mg by mouth two (2) times a day. albuterol (ACCUNEB) 1.25 mg/3 mL nebu  Self Yes   Sig: Take 1.25 mg by inhalation every eight (8) hours as needed for Wheezing or Shortness of Breath. albuterol (PROVENTIL HFA, VENTOLIN HFA, PROAIR HFA) 90 mcg/actuation inhaler  Self No   Sig: Take 2 Puffs by inhalation every four (4) hours as needed for Wheezing. amLODIPine (NORVASC) 5 mg tablet 2021 at pm Self Yes   Sig: Take 5 mg by mouth daily. aspirin 81 mg chewable tablet 2021 at am Self Yes   Sig: Take 81 mg by mouth daily. cetirizine (ZYRTEC) 10 mg tablet 2021 at Unknown time Self Yes   Sig: Take 10 mg by mouth daily. clopidogrel (PLAVIX) 75 mg tablet 2021 at pm Self Yes   Sig: Take 75 mg by mouth daily. ergocalciferol (Vitamin D2) 1,250 mcg (50,000 unit) capsule 2021 at Unknown time Self Yes   Sig: Take 50,000 Units by mouth every Wednesday. escitalopram oxalate (LEXAPRO) 20 mg tablet 2021 at Unknown time Self Yes   Sig: Take 20 mg by mouth daily. famotidine (PEPCID) 40 mg tablet 2021 at Unknown time Self Yes   Sig: Take 40 mg by mouth nightly. gabapentin (NEURONTIN) 300 mg capsule 2021 at Unknown time Self Yes   Sig: Take 900 mg by mouth Every morning.   gabapentin (NEURONTIN) 300 mg capsule 2021 at Unknown time Self Yes   Sig: Take 600 mg by mouth nightly. insulin aspart U-100 (NovoLOG Flexpen U-100 Insulin) 100 unit/mL (3 mL) inpn 2021 at Unknown time Self Yes   Sig: by SubCUTAneous route Before breakfast, lunch, and dinner. Sliding scale   insulin detemir U-100 (LEVEMIR FLEXTOUCH) 100 unit/mL (3 mL) inpn 2021 at Unknown time Self Yes   Si Units by SubCUTAneous route two (2) times a day. levothyroxine (SYNTHROID) 50 mcg tablet 2021 at Unknown time Self Yes   Sig: Take 50 mcg by mouth Daily (before breakfast). metoprolol succinate (TOPROL-XL) 25 mg XL tablet 2021 at Unknown time Self Yes   Sig: Take 25 mg by mouth daily. montelukast (SINGULAIR) 10 mg tablet 2021 at Unknown time Self Yes   Sig: Take 10 mg by mouth daily. nitroglycerin (NITRODUR) 0.4 mg/hr 2021 at Unknown time Self Yes   Si Patch by TransDERmal route daily. sodium bicarbonate 325 mg tablet 2021 at Unknown time Self Yes   Sig: Take 325 mg by mouth two (2) times a day. Facility-Administered Medications: None         Please contact the main inpatient pharmacy with any questions or concerns at [phone] and we will direct you to the clinical pharmacist covering this patient's care while in-house.    Randy Parish, PharmD, BCPS

## 2021-08-27 NOTE — ED NOTES
[facility] here to transport pt to [facility] ED  EMTALA given to [employer]  Repot given, care transferred at this time

## 2021-08-27 NOTE — PROGRESS NOTES
1645 TRANSFER - IN REPORT:    Verbal report received from Masoud Priest RN(name) on Kati Fniney  being received from ED(unit) for routine progression of care      Report consisted of patients Situation, Background, Assessment and   Recommendations(SBAR). Information from the following report(s) SBAR, Kardex, ED Summary, Intake/Output, MAR, Recent Results and Cardiac Rhythm Ventricular Paced was reviewed with the receiving nurse. Opportunity for questions and clarification was provided. Assessment completed upon patients arrival to unit and care assumed. This patient was assisted with Intentional Toileting every 2 hours during this shift as appropriate. Documentation of ambulation and output reflected on Flowsheet as appropriate. Purposeful hourly rounding was completed using AIDET and 5Ps. Outcomes of PHR documented as they occurred. Bed alarm in use as appropriate. Dual Suction and ambubag in place. 1802 Pt arrived to unit. Pt refusing to have BG checked. Refusing to participate in [facility] unit Dysphagia screening. Notified Dr. Shavonne Fermin of Pt repeatedly attempting to get out of bed and pulling out lines. Received orders for Restraints. Dr. Shavonne Nos also aware of Pt /72. No further orders at this time. 1815 Soft Wrist Restraints applied to Pt. Notified Julio (Pt POA). Family members to come to bedside to help re-orient Pt.     1930 Bedside and Verbal shift change report given to Nelly Crespo (oncoming nurse) by [facility] RN (offgoing nurse). Report included the following information SBAR, Kardex, ED Summary, Intake/Output, MAR, Recent Results and Cardiac Rhythm V-Paced.

## 2021-08-27 NOTE — ED NOTES
Patient refused medication and BG check. Patient became combative and verbally abusive towards RN. Will continue to monitor.

## 2021-08-27 NOTE — ED NOTES
Bedside and Verbal shift change report given to Kaya BAE (oncoming nurse) by Jey Gong (offgoing nurse). Report included the following information SBAR, Kardex, ED Summary, Intake/Output, Recent Results and Cardiac Rhythm Miguel Ángel/Paced. Pt in bed resting quietly w/ lights dimmed.

## 2021-08-27 NOTE — ED NOTES
Patient was pulling at all lines and getting out of bed at this time. Patient has been reoriented to room and physician notified. No new orders at this time. Will continue to monitor.

## 2021-08-27 NOTE — ED NOTES
Appears asleep at this time, respirations even and unlabored, no signs of distress noted at this time  Remains on 2L-O2, con't cardiac monitor, vitals updated in chart    Received call from [address] [place] [address], pt's niece, updated her on transport time of approx 0330 to [facility] ED

## 2021-08-27 NOTE — H&P
[address]  [phone]    Hospitalist Admission Note      NAME:  Lázaro Mera   :   1940   MRN:  [id number]     PCP:  Jonnie Enriquez MD     Date of Service/Time:  2021 3:45 AM         Assessment / Plan:       79 y.o. female with hx of CAD, HTN, DM, CKD4, dementia presenting w/ fever, weakness, AMS found to have PNA, admitted for severe sepsis      Severe sepsis / septic shock: 2/2 PNA. CXR showed patchy bilateral airspace disease. Rapid COVID test negative. Check RVP/COVID-PCR. Start cefepime and azithromycin. Given IVF boluses and was still hypotensive so started on vasopressor. ED attending discussed w/ family who preferred no CVC placement for now. Use phenylephrine for now. Follow cultures. Check procalcitonin. Send sputum culture, PNA workup (Legionella and Strep Pneumo urine ag, Mycoplasma IgM)      Chronic obstructive pulmonary disease: no wheezing. Combivent PRN      Hypertension: now hypotensive. Holding Norvasc and metoprolol      Hypothyroid: check TSH. Continue LT4      CAD (coronary artery disease) / History of heart artery stent: cont DAPT, statin. Holding BB      Diabetes: type 2, associated w/ CKD4. Poor control. On determir at home. Use NPH here. CKD (chronic kidney disease) stage 4, GFR 15-29 ml/min: stable. Follow BMP       Acute metabolic encephalopathy 2/2 sepsis. Had dementia and is at risk for delirium. Avoid benzos, opioids, anticholinergics. Verbally re-direct whenever possible. Avoid chemical restraints unless pt is a danger to self or others. Thrombocytopenia: chronic, stable. Follow PLT         Code Status: DNR     Surrogate decision maker: child      ED notes, lab results, and imaging studies reviewed.        Total time spent with patient: 50 Minutes CC  Time spent in the care of this patient included reviewing records, discussing with nursing, obtaining history and examining the patient, and discussing treatment plans, with >50% time spent counseling/coordinating care  Critical Care:  I personally spent 50 minutes in providing critical care. The reason for providing this level of medical care for this critically ill patient was due to a critical illness (septic shock) that impaired one or more vital organ systems such that there was a high probability of imminent or life threatening deterioration in the patient's condition. This care involved high complexity decision making to assess, manipulate, and support vital system functions. Risk of deterioration: High                 Care Plan discussed with: ED provider, Patient, Nursing Staff and >50% of time spent in counseling and coordination of care    Discussed:  Care Plan and D/C Planning    Prophylaxis:  Hep SQ    Disposition:  SNF/LTC                 Subjective:     CHIEF COMPLAINT: fever, weak    HISTORY OF PRESENT ILLNESS:     Ms. Enoc Chua is a 79 y.o. female w/ hx of CAD, HTN, DM, CKD4, dementia presenting w/ fever, weakness. Started yesterday, febrile at home, associated with AMS and lethargy per family. Pt also c/o chest pain and cough. ED workup showed 3/4 SIRS with bilateral PNA evident on CXR. Ms. Enoc Chua is admitted for further evaluation and management.       Past Medical History:   Diagnosis Date    Chronic obstructive [facility] disease ([facility]     Diabetes ([facility]     Diverticulitis     DVT (deep venous thrombosis) (HCC)     History of heart artery stent     Hypertension     Hypothyroid     Renal failure         Past Surgical History:   Procedure Laterality Date    HX APPENDECTOMY      HX CYST REMOVAL      HX HYSTERECTOMY      HX IMPLANTABLE CARDIOVERTER DEFIBRILLATOR      HX PACEMAKER         Social History     Tobacco Use    Smoking status: Never Smoker    Smokeless tobacco: Never Used   Substance Use Topics    Alcohol use: No         Family History: No family history of CV disease    Allergies   Allergen Reactions    Codeine Nausea and Vomiting        Prior to Admission medications    Medication Sig Start Date End Date Taking? Authorizing Provider   ergocalciferol (Vitamin D2) 1,250 mcg (50,000 unit) capsule Take 50,000 Units by mouth every seven (7) days. Yes Provider, Historical   gabapentin (NEURONTIN) 300 mg capsule Take 900 mg by mouth Every morning. Yes Provider, Historical   gabapentin (NEURONTIN) 300 mg capsule Take 600 mg by mouth nightly. Yes Provider, Historical   insulin detemir U-100 (LEVEMIR FLEXTOUCH) 100 unit/mL (3 mL) inpn 30 Units by SubCUTAneous route two (2) times a day. Yes Provider, Historical   insulin aspart U-100 (NovoLOG Flexpen U-100 Insulin) 100 unit/mL (3 mL) inpn 4-12 Units by SubCUTAneous route Before breakfast, lunch, dinner and at bedtime. Yes Provider, Historical   metoprolol succinate (TOPROL-XL) 25 mg XL tablet Take 25 mg by mouth daily. Yes Provider, Historical   albuterol (PROVENTIL HFA, VENTOLIN HFA, PROAIR HFA) 90 mcg/actuation inhaler Take 2 Puffs by inhalation every four (4) hours as needed for Wheezing. Yes Provider, Historical   famotidine (PEPCID) 40 mg tablet Take 40 mg by mouth nightly. Yes Provider, Historical   escitalopram oxalate (LEXAPRO) 20 mg tablet Take 20 mg by mouth daily. 8/2/21  Yes Other, MD Sarah   amLODIPine (NORVASC) 5 mg tablet Take 5 mg by mouth daily. 8/18/21  Yes Other, MD Sarah   nitroglycerin (NITRODUR) 0.1 mg/hr nitroglycerin 0.1 mg/hr transdermal 24 hour patch   Apply 1 patch every day by transdermal route. Yes Other, MD Sarah   QUEtiapine (SEROquel) 100 mg tablet Take 100 mg by mouth two (2) times a day. 8/3/21  Yes Other, MD Sarah   raNITIdine hcl 150 mg capsule Take 150 mg by mouth At bedtime. Yes Other, MD Sarah   levothyroxine (SYNTHROID) 50 mcg tablet Take 50 mcg by mouth Daily (before breakfast).  1/12/18  Yes Provider, Historical   sodium bicarbonate 325 mg tablet Take 325 mg by mouth two (2) times a day. 1/20/18  Yes Provider, Historical   cetirizine (ZYRTEC) 5 mg tablet Take 5 mg by mouth daily as needed. Yes Provider, Historical   montelukast (SINGULAIR) 10 mg tablet Take 10 mg by mouth daily. 8/27/15  Yes Other, MD Sarah   spironolactone (ALDACTONE) 25 mg tablet Take 25 mg by mouth every other day. 7/5/15  Yes Other, MD Sarah   clopidogrel (PLAVIX) 75 mg tablet Take 75 mg by mouth daily. 7/5/15  Yes Other, MD Sarah   aspirin 81 mg chewable tablet Take 81 mg by mouth daily. 7/5/15  Yes Other, MD Sarah   atorvastatin (LIPITOR) 20 mg tablet  7/5/15  Yes Other, MD Sarah   albuterol sulfate (PROAIR RESPICLICK) 90 mcg/actuation breath activated inhaler albuterol sulfate 90 mcg/actuation breath activated powder inhaler   Inhale 2 puffs every 4 hours by inhalation route. Other, MD Sarah   albuterol (ACCUNEB) 1.25 mg/3 mL nebu INHALE THE CONTENTS OF 1 VIAL VIA NEBULIZER EVERY 8 HOURS AS NEEDED 8/12/21   Other, MD Sarah   QUEtiapine (SEROQUEL) 25 mg tablet take 2 tablets by mouth twice a day 9/4/18   Amy Gomez MD   NITROSTAT 0.4 mg SL tablet  7/5/15   Other, MD Sarah       Review of Systems:  (bold if positive, if negative)    Gen:   fever, chills, fatigueEyes:  ENT:  CVS:  chest painPulm:  Cough, dyspnea,GI:  :  MS:  Skin:  Psych:  Endo:  Hem:  Renal:  Neuro:            Objective:      VITALS:    Vital signs reviewed; most recent are:    Visit Vitals  BP (!) 121/53   Pulse (!) 55   Temp 99.1 °F (37.3 °C)   Resp 25   Wt 69.4 kg (153 lb)   SpO2 96%   BMI 23.96 kg/m²     SpO2 Readings from Last 6 Encounters:   08/27/21 96%   12/04/18 96%   12/20/15 98%   09/16/15 95%    O2 Flow Rate (L/min): 2 l/min       Intake/Output Summary (Last 24 hours) at 8/27/2021 0345  Last data filed at 8/26/2021 2221  Gross per 24 hour   Intake 2250 ml   Output    Net 2250 ml            Exam:     Physical Exam:    Gen:  Elderly, frail, chronically ill-appearing.  NAD  EENT:  No scleral icterus, hearing intact to voice, dry mucous membranes  Neck:  Supple, without masses. Resp:  No accessory muscle use. Mildly diminished without wheezing, rales, rhonchi  Card: distant, RRR. Normal S1 and S2, 2/6 systolic murmur, rubs, or gallops. Trace peripheral lower extremity edema. No JVD. Peripheral pulses in tact. Abd:  Normoactive bowel sounds. Soft, non-tender, non-distended. No rebound, no guarding. No appreciable hepatosplenomegaly   Musc:  No cyanosis or clubbing  Skin:  No rashes or ulcers; turgor intact. Cap refill ~2 secs  Neuro:  Cranial nerves are grossly intact, no focal motor weakness, follows simple commands appropriately. Generalized weakness  Psych:  Poor insight, normal affect. Alert, oriented to self and hospital. Answers simple questions appropriately       Labs:    Recent Labs     08/27/21  0144   WBC 19.7*   HGB 10.4*   HCT 32.5*   PLT 97*     Recent Labs     08/27/21  0144      K 4.5   *   CO2 24   *   BUN 41*   CREA 2.14*   CA 8.3*   MG 1.6   PHOS 3.1   ALB 3.0*   ALT 13     No components found for: GLPOC  No results for input(s): PH, PCO2, PO2, HCO3, FIO2 in the last 72 hours. No results for input(s): INR, INREXT in the last 72 hours. No results found for: SDES  No results found for: CULT  All other current labs reviewed in the computer. Imaging/Studies:    XR CHEST PORT    Result Date: 8/26/2021  Patchy bilateral airspace disease. Imaging personally reviewed.     EKG: paced  EKG personally reviewed    ___________________________________________________    Attending Physician: Prosper Mccallum MD

## 2021-08-27 NOTE — ED NOTES
Left VM w/ CB # for MD Martines in regards to d/g pt. Olegario gtt stopped by offgoing RN prior to bedside shift report this AM. BP/MAPs remain stable. Awaiting CB/transfer order.

## 2021-08-27 NOTE — ED NOTES
TRANSFER - OUT REPORT:    Verbal report given to Chen (name) on Molly Gottron  being transferred to [facility] (unit) for routine progression of care       Report consisted of patients Situation, Background, Assessment and   Recommendations(SBAR). Information from the following report(s) SBAR, Kardex, ED Summary, [facility] and Recent Results was reviewed with the receiving nurse. Lines:   Peripheral IV 08/26/21 Anterior; Left Forearm (Active)   Site Assessment Clean, dry, & intact 08/27/21 0831   Phlebitis Assessment 0 08/27/21 0831   Infiltration Assessment 0 08/27/21 0831   Dressing Status Clean, dry, & intact 08/27/21 0831   Dressing Type Transparent 08/27/21 0831   Hub Color/Line Status Pink 08/27/21 0831   Action Taken Open ports on tubing capped 08/27/21 0831   Alcohol Cap Used Yes 08/27/21 0831       Peripheral IV 08/26/21 Right Antecubital (Active)   Site Assessment Clean, dry, & intact 08/27/21 0831   Phlebitis Assessment 0 08/27/21 0831   Infiltration Assessment 0 08/27/21 0831   Dressing Status Clean, dry, & intact 08/27/21 0831   Dressing Type Transparent 08/27/21 0831   Hub Color/Line Status Pink 08/27/21 0831   Action Taken Open ports on tubing capped 08/27/21 0831   Alcohol Cap Used Yes 08/27/21 0831        Opportunity for questions and clarification was provided.       Patient transported with:   [employer]

## 2021-08-27 NOTE — ED NOTES
Bedside and Verbal shift change report given to [address] [place] [address] (oncoming nurse) by Kaya BAE (offgoing nurse). Report included the following information SBAR, Kardex, ED Summary, Intake/Output, Recent Results and Cardiac Rhythm V-Paced.

## 2021-08-27 NOTE — PROGRESS NOTES
Dr. Hercules Section consulted pharmacy to renally dose cefepime. Will order Cefepime 2 grams IV q24H for CrCl of 20 ml/min.

## 2021-08-27 NOTE — ED NOTES
While administering medications, pt awoke and stated to this RN, \"I had some discomfort a few minutes ago. \" points to her L chest, denies nausea, stated \"I guess I'm a little short of breath but not much\", noted slight expiratory wheeze otherwise no signs of distress at this time  Dr Taisha Martinez aware, EKG ordered

## 2021-08-27 NOTE — ED NOTES
Pt stated that she thought she had to urinate, sat on bedpan for several minutes with no success  Placed purewick catheter on pt, instructed on use, pt able to urinate, voided approx 250mL urine at this time

## 2021-08-27 NOTE — PROGRESS NOTES
Gave verbal sign-out over the phone at 5:59 AM to Dr. Kelly Wright whose practice has accepted care of this patient.       Abby Olsen MD  8/27/2021

## 2021-08-27 NOTE — ED NOTES
Pt's niece, Tatum Chester, leaving at this time, she is pt's next of kin, call for anything, #[phone]

## 2021-08-28 ENCOUNTER — APPOINTMENT (OUTPATIENT)
Dept: GENERAL RADIOLOGY | Age: 81
DRG: 871 | End: 2021-08-28
Attending: FAMILY MEDICINE
Payer: MEDICARE

## 2021-08-28 LAB
EST. AVERAGE GLUCOSE BLD GHB EST-MCNC: 177 MG/DL
GLUCOSE BLD STRIP.AUTO-MCNC: 126 MG/DL (ref 65–117)
GLUCOSE BLD STRIP.AUTO-MCNC: 130 MG/DL (ref 65–117)
GLUCOSE BLD STRIP.AUTO-MCNC: 214 MG/DL (ref 65–117)
GLUCOSE BLD STRIP.AUTO-MCNC: 278 MG/DL (ref 65–117)
HBA1C MFR BLD: 7.8 % (ref 4–5.6)
SERVICE CMNT-IMP: ABNORMAL
TSH SERPL DL<=0.05 MIU/L-ACNC: 2.91 UIU/ML (ref 0.36–3.74)

## 2021-08-28 PROCEDURE — 74011250637 HC RX REV CODE- 250/637: Performed by: FAMILY MEDICINE

## 2021-08-28 PROCEDURE — 74011636637 HC RX REV CODE- 636/637: Performed by: INTERNAL MEDICINE

## 2021-08-28 PROCEDURE — 74011000258 HC RX REV CODE- 258: Performed by: FAMILY MEDICINE

## 2021-08-28 PROCEDURE — 84443 ASSAY THYROID STIM HORMONE: CPT

## 2021-08-28 PROCEDURE — 74011250636 HC RX REV CODE- 250/636: Performed by: INTERNAL MEDICINE

## 2021-08-28 PROCEDURE — 65660000000 HC RM CCU STEPDOWN

## 2021-08-28 PROCEDURE — 71045 X-RAY EXAM CHEST 1 VIEW: CPT

## 2021-08-28 PROCEDURE — 74011250637 HC RX REV CODE- 250/637: Performed by: INTERNAL MEDICINE

## 2021-08-28 PROCEDURE — 36415 COLL VENOUS BLD VENIPUNCTURE: CPT

## 2021-08-28 PROCEDURE — 82962 GLUCOSE BLOOD TEST: CPT

## 2021-08-28 PROCEDURE — 74011250636 HC RX REV CODE- 250/636: Performed by: FAMILY MEDICINE

## 2021-08-28 PROCEDURE — 83036 HEMOGLOBIN GLYCOSYLATED A1C: CPT

## 2021-08-28 PROCEDURE — 74011000250 HC RX REV CODE- 250: Performed by: INTERNAL MEDICINE

## 2021-08-28 RX ORDER — METOPROLOL SUCCINATE 25 MG/1
25 TABLET, EXTENDED RELEASE ORAL DAILY
Status: DISCONTINUED | OUTPATIENT
Start: 2021-08-28 | End: 2021-09-07

## 2021-08-28 RX ORDER — FAMOTIDINE 20 MG/1
20 TABLET, FILM COATED ORAL
Status: DISCONTINUED | OUTPATIENT
Start: 2021-08-28 | End: 2021-09-11 | Stop reason: HOSPADM

## 2021-08-28 RX ORDER — LEVOTHYROXINE SODIUM 50 UG/1
50 TABLET ORAL
Status: DISCONTINUED | OUTPATIENT
Start: 2021-08-29 | End: 2021-09-06

## 2021-08-28 RX ORDER — GUAIFENESIN 100 MG/5ML
81 LIQUID (ML) ORAL DAILY
Status: DISCONTINUED | OUTPATIENT
Start: 2021-08-28 | End: 2021-09-11 | Stop reason: HOSPADM

## 2021-08-28 RX ORDER — AMLODIPINE BESYLATE 5 MG/1
5 TABLET ORAL DAILY
Status: DISCONTINUED | OUTPATIENT
Start: 2021-08-28 | End: 2021-09-02

## 2021-08-28 RX ORDER — QUETIAPINE FUMARATE 100 MG/1
100 TABLET, FILM COATED ORAL 2 TIMES DAILY
Status: DISCONTINUED | OUTPATIENT
Start: 2021-08-28 | End: 2021-09-06

## 2021-08-28 RX ORDER — CLOPIDOGREL BISULFATE 75 MG/1
75 TABLET ORAL DAILY
Status: DISCONTINUED | OUTPATIENT
Start: 2021-08-28 | End: 2021-09-11 | Stop reason: HOSPADM

## 2021-08-28 RX ORDER — ESCITALOPRAM OXALATE 10 MG/1
20 TABLET ORAL DAILY
Status: DISCONTINUED | OUTPATIENT
Start: 2021-08-28 | End: 2021-09-06

## 2021-08-28 RX ORDER — MONTELUKAST SODIUM 10 MG/1
10 TABLET ORAL DAILY
Status: DISCONTINUED | OUTPATIENT
Start: 2021-08-28 | End: 2021-09-06

## 2021-08-28 RX ORDER — FAMOTIDINE 20 MG/1
40 TABLET, FILM COATED ORAL
Status: DISCONTINUED | OUTPATIENT
Start: 2021-08-28 | End: 2021-08-28

## 2021-08-28 RX ORDER — ATORVASTATIN CALCIUM 20 MG/1
20 TABLET, FILM COATED ORAL
Status: DISCONTINUED | OUTPATIENT
Start: 2021-08-28 | End: 2021-08-28

## 2021-08-28 RX ADMIN — Medication 10 ML: at 21:07

## 2021-08-28 RX ADMIN — Medication 10 ML: at 13:41

## 2021-08-28 RX ADMIN — HEPARIN SODIUM 5000 UNITS: 5000 INJECTION INTRAVENOUS; SUBCUTANEOUS at 05:46

## 2021-08-28 RX ADMIN — QUETIAPINE FUMARATE 100 MG: 100 TABLET ORAL at 17:12

## 2021-08-28 RX ADMIN — MONTELUKAST 10 MG: 10 TABLET, FILM COATED ORAL at 11:01

## 2021-08-28 RX ADMIN — HEPARIN SODIUM 5000 UNITS: 5000 INJECTION INTRAVENOUS; SUBCUTANEOUS at 21:07

## 2021-08-28 RX ADMIN — CEFEPIME 2 G: 20 INJECTION, POWDER, FOR SOLUTION INTRAVENOUS at 05:46

## 2021-08-28 RX ADMIN — INSULIN LISPRO 3 UNITS: 100 INJECTION, SOLUTION INTRAVENOUS; SUBCUTANEOUS at 12:12

## 2021-08-28 RX ADMIN — ESCITALOPRAM OXALATE 20 MG: 10 TABLET ORAL at 11:01

## 2021-08-28 RX ADMIN — METOPROLOL SUCCINATE 25 MG: 25 TABLET, EXTENDED RELEASE ORAL at 11:01

## 2021-08-28 RX ADMIN — DOXYCYCLINE 100 MG: 100 INJECTION, POWDER, LYOPHILIZED, FOR SOLUTION INTRAVENOUS at 21:07

## 2021-08-28 RX ADMIN — INSULIN HUMAN 15 UNITS: 100 INJECTION, SUSPENSION SUBCUTANEOUS at 08:33

## 2021-08-28 RX ADMIN — QUETIAPINE FUMARATE 100 MG: 100 TABLET ORAL at 11:01

## 2021-08-28 RX ADMIN — CLOPIDOGREL BISULFATE 75 MG: 75 TABLET, FILM COATED ORAL at 11:01

## 2021-08-28 RX ADMIN — DOXYCYCLINE 100 MG: 100 INJECTION, POWDER, LYOPHILIZED, FOR SOLUTION INTRAVENOUS at 08:34

## 2021-08-28 RX ADMIN — ASPIRIN 81 MG: 81 TABLET, CHEWABLE ORAL at 11:01

## 2021-08-28 RX ADMIN — AMLODIPINE BESYLATE 5 MG: 5 TABLET ORAL at 11:01

## 2021-08-28 RX ADMIN — INSULIN LISPRO 5 UNITS: 100 INJECTION, SOLUTION INTRAVENOUS; SUBCUTANEOUS at 08:33

## 2021-08-28 RX ADMIN — FAMOTIDINE 20 MG: 20 TABLET ORAL at 21:07

## 2021-08-28 RX ADMIN — HEPARIN SODIUM 5000 UNITS: 5000 INJECTION INTRAVENOUS; SUBCUTANEOUS at 13:41

## 2021-08-28 NOTE — PROGRESS NOTES
Pharmacy Dosing Services:     Pepcid dose decreased from 40 mg daily to 20 mg daily per P&T protocol.  - CrCL < 50 ml/min    Thank you,  Antonia Santana, PharmD

## 2021-08-28 NOTE — PROGRESS NOTES
0700 Bedside and Verbal shift change report given to Manolo Morin (oncoming nurse) by Imelad Saldana (offgoing nurse). Report included the following information SBAR, Kardex, ED Summary, Intake/Output, MAR, Recent Results and Cardiac Rhythm Ventricular Paced. This patient was assisted with Intentional Toileting every 2 hours during this shift as appropriate. Documentation of ambulation and output reflected on Flowsheet as appropriate. Purposeful hourly rounding was completed using AIDET and 5Ps. Outcomes of PHR documented as they occurred. Bed alarm in use as appropriate. Dual Suction and ambubag in place. Required Restraint Documentation: delete if NA    Restraint type: Soft restraint:  right wrist and left wrist  Reason for restraints: Interference with medical treatment  Duration: 24 hours    Restraints must be removed when an alternative is available and effective and/or patient no longer meets criteria. Orders must be renewed every calendar day or when discontinued. The MD must conduct a face to face assessment within 1 calendar day of initiation when initial restraint order is verbal.    0900 Notified Dr. Shazia Lakhani of Pt /83 and O2 90 on 2L oxygen. No further orders at this time will continue to monitor. 26 Notified Dr. Shazia Lakhani about crackles noted in Pt lungs and elevated /85. Orders received to continue to hold IV fluids and order chest X-ray. Orders additionally received to continue Bilateral Soft Wrist Restraints until 1330 tomorrow 8/29.     1833 Notified Dr. Shazia Lakhani of results of Chest X-Ray. No further orders at this time. 1930 Bedside and Verbal shift change report given to Imelda Saldana (oncoming nurse) by Aziza Faulkner RN (offgoing nurse). Report included the following information SBAR, Kardex, ED Summary, Intake/Output, MAR, Recent Results and Cardiac Rhythm Ventricular Paced.

## 2021-08-28 NOTE — PROGRESS NOTES
Daily Progress Note: 8/28/2021  Soraida Link, Mateo Patel MD    Assessment/Plan:   Severe sepsis / septic shock: 2/2 PNA. -CXR showed patchy bilateral airspace disease. Rapid COVID test negative.   -Check RVP/COVID-PCR.   -Start cefepime and azithromycin. -IVF  - Follow cultures.   -Check procalcitonin.   -Send sputum culture, PNA workup (Legionella and Strep Pneumo urine ag, Mycoplasma IgM)     Chronic obstructive pulmonary disease: no wheezing.   -Combivent PRN     Hypertension: now hypotensive.   -Holding Norvasc and metoprolol-will restart     Hypothyroid:   -check TSH. Continue LT4     CAD (coronary artery disease) / History of heart artery stent:   -cont DAPT, statin.      Diabetes: type 2, associated w/ CKD4.   -Poor control.   -On determir at home.   -Use NPH here.      CKD (chronic kidney disease) stage 4, GFR 15-29 ml/min: stable. Follow BMP     Acute metabolic encephalopathy 2/2 sepsis.   -Has dementia and is at risk for delirium.   -Avoid benzos, opioids, anticholinergics. -Verbally re-direct whenever possible.   -Avoid chemical restraints unless pt is a danger to self or others.  -Haldol as needed  -Wrist restraints as needed when family not present      Thrombocytopenia: chronic, stable.    -Follow PLT     Code Status: DNR     Problem List:  Problem List as of 8/28/2021 Date Reviewed: 8/26/2021        Codes Class Noted - Resolved    Thrombocytopenia ([facility] ICD-10-CM: D69.6  ICD-9-CM: 287.5  8/27/2021 - Present        Chronic obstructive pulmonary disease (HCC) ICD-10-CM: J44.9  ICD-9-CM: 80  Unknown - Present        Hypertension ICD-10-CM: I10  ICD-9-CM: 401.9  Unknown - Present        Hypothyroid ICD-10-CM: E03.9  ICD-9-CM: 244.9  Unknown - Present        CAD (coronary artery disease) ICD-10-CM: I25.10  ICD-9-CM: 414.00  Unknown - Present        History of heart artery stent ICD-10-CM: Z95.5  ICD-9-CM: V45.82  Unknown - Present        Diabetes ([facility] ICD-10-CM: E11.9  ICD-9-CM: 250.00  Unknown - Present        CKD (chronic kidney disease) stage 4, GFR 15-29 ml/min (HCC) ICD-10-CM: N18.4  ICD-9-CM: 390. 4  Unknown - Present        Severe sepsis (HCC) ICD-10-CM: A41.9, R65.20  ICD-9-CM: 038.9, 995.92  Unknown - Present        Pneumonia ICD-10-CM: J18.9  ICD-9-CM: 164  Unknown - Present        Dementia ([address] ICD-10-CM: F03.90  ICD-9-CM: 294.20  2021 - Present        Acute metabolic encephalopathy Z-07-LA: G93.41  ICD-9-CM: 348.31  2021 - Present              Subjective:    79 y.o. female w/ hx of CAD, HTN, DM, CKD4, dementia presenting w/ fever, weakness. Started yesterday, febrile at home, associated with AMS and lethargy per family. Pt also c/o chest pain and cough. ED workup showed 3/4 SIRS with bilateral PNA evident on CXR. Ms. Usman Moura is admitted for further evaluation and management. (Dr Hanny Crow)    : She is confused this am. Not able to provide any history and family not present. Off pressors. BP stable. : Confused. Has been more agitated during the night requiring restraints. Change antibiotic to Doxy so that we could use Haldol. BP is higher so will restart meds. Ask case management to see.       Review of Systems:   Review of systems not obtained due to patient factors. Objective:   Physical Exam:     Visit Vitals  BP (!) 169/83 (BP 1 Location: Left upper arm, BP Patient Position: Semi fowlers)   Pulse 85   Temp 98.6 °F (37 °C)   Resp 18   Ht 5' 2\" (1.575 m)   Wt 155 lb 9.6 oz (70.6 kg)   SpO2 90%   BMI 28.46 kg/m²    O2 Flow Rate (L/min): 2 l/min O2 Device: None (Room air)  Temp (24hrs), Av.2 °F (36.8 °C), Min:98.1 °F (36.7 °C), Max:98.6 °F (37 °C)    No intake/output data recorded. 1 -  0700  In: 2250 [I.V.:2250]  Out: 600 [Urine:600]  General:  Confused,  no distress, frail appearing. Head:  Normocephalic, without obvious abnormality, atraumatic.    Eyes:  Conjunctivae/corneas clear   Nose: Nares normal. Septum midline. Mucosa normal. No drainage or sinus tenderness. Throat: Lips, mucosa, and tongue moist..   Neck: Supple, symmetrical, trachea midline, no adenopathy, thyroid: no enlargement/tenderness/nodules, no carotid bruit and no JVD. Back:   Symmetric, no curvature. ROM normal. No CVA tenderness. Lungs:   Clear to auscultation bilaterally. Chest wall:  No tenderness or deformity. Heart:  Regular rate and rhythm, S1, S2 normal, no murmur, click, rub or gallop. Abdomen:   Soft, non-tender. Bowel sounds normal. No masses,  No organomegaly. Extremities: no cyanosis or edema. No calf tenderness or cords. Pulses: 2+ and symmetric all extremities. Skin: Skin color, texture, turgor normal. No rashes or lesions   Neurologic: CNII-XII intact. Alert and oriented X 1. Fine motor of hands and fingers normal.   equal.  No cogwheeling or rigidity. Gait not tested at this time. Sensation grossly normal to touch. Gross motor of extremities normal.       Data Review:    Port CXR 8/27/21   INDICATION: fever, cough  EXAM:  AP CHEST RADIOGRAPH  COMPARISON: December 4, 2018  FINDINGS:  AP portable view of the chest demonstrates right subclavian pacemaker. Heart  size is normal. Patchy airspace disease throughout the right lung and left lung  base. No pneumothorax. The osseous structures are unremarkable. IMPRESSION  Patchy bilateral airspace disease. Recent Days:  Recent Labs     08/27/21  0144 08/26/21  1856   WBC 19.7* 20.2*   HGB 10.4* 10.9*   HCT 32.5* 33.5*   PLT 97* 105*     Recent Labs     08/27/21  0144 08/26/21  1856    141   K 4.5 4.8   * 106   CO2 24 25   * 295*   BUN 41* 45*   CREA 2.14* 2.42*   CA 8.3* 9.0   MG 1.6  --    PHOS 3.1  --    ALB 3.0* 3.2*   TBILI 0.6 1.1*   ALT 13 13     No results for input(s): PH, PCO2, PO2, HCO3, FIO2 in the last 72 hours.     24 Hour Results:  Recent Results (from the past 24 hour(s))   GLUCOSE, POC    Collection Time: 08/27/21  9:37 PM Result Value Ref Range    Glucose (POC) 128 (H) 65 - 117 mg/dL    Performed by Caryle Terri Taylor    TSH 3RD GENERATION    Collection Time: 08/28/21  6:06 AM   Result Value Ref Range    TSH 2.91 0.36 - 3.74 uIU/mL   GLUCOSE, POC    Collection Time: 08/28/21  8:10 AM   Result Value Ref Range    Glucose (POC) 278 (H) 65 - 117 mg/dL    Performed by Nguyen Dalton        Medications reviewed  Current Facility-Administered Medications   Medication Dose Route Frequency    insulin NPH (NOVOLIN N, HUMULIN N) injection 18 Units  18 Units SubCUTAneous ACB&D    cefepime (MAXIPIME) 2 g in sterile water (preservative free) 10 mL IV syringe  2 g IntraVENous Q24H    0.9% sodium chloride infusion  75 mL/hr IntraVENous CONTINUOUS    insulin lispro (HUMALOG) injection   SubCUTAneous AC&HS    glucose chewable tablet 16 g  4 Tablet Oral PRN    dextrose (D50W) injection syrg 12.5-25 g  12.5-25 g IntraVENous PRN    glucagon (GLUCAGEN) injection 1 mg  1 mg IntraMUSCular PRN    ipratropium-albuterol (COMBIVENT RESPIMAT) 20 mcg-100 mcg inhalation spray  1 Puff Inhalation Q6H PRN    heparin (porcine) injection 5,000 Units  5,000 Units SubCUTAneous Q8H    haloperidol lactate (HALDOL) injection 1 mg  1 mg IntraMUSCular Q6H PRN    doxycycline (VIBRAMYCIN) 100 mg in 0.9% sodium chloride (MBP/ADV) 100 mL MBP  100 mg IntraVENous Q12H    sodium chloride (NS) flush 5-10 mL  5-10 mL IntraVENous PRN    sodium chloride (NS) flush 5-40 mL  5-40 mL IntraVENous Q8H    sodium chloride (NS) flush 5-40 mL  5-40 mL IntraVENous PRN    acetaminophen (TYLENOL) tablet 650 mg  650 mg Oral Q6H PRN    Or    acetaminophen (TYLENOL) suppository 650 mg  650 mg Rectal Q6H PRN    polyethylene glycol (MIRALAX) packet 17 g  17 g Oral DAILY PRN    PHENYLephrine (CHIRAG-SYNEPHRINE) 30 mg in 0.9% sodium chloride 250 mL infusion   mcg/min IntraVENous TITRATE       Care Plan discussed with: Patient/Family and Nurse    Total time spent with patient: 30 minutes.     René Velez MD

## 2021-08-28 NOTE — PROGRESS NOTES
2:08 PM    CM spoke to MD who feels patient will need placement at dc. CM called and spoke to patient's niece, she would like a referral placed with the [facility]. She is aware authorization will be required for discharge. Patient will also need PT and OT evaluations. CM will send referral through [facility]. Patient is fully vaccinated.  Brittany Patterson

## 2021-08-28 NOTE — PROGRESS NOTES
Bedside and Verbal shift change report given to Zhao Price (oncoming nurse) by Cortez Huggins (offgoing nurse). Report included the following information SBAR, Kardex and Cardiac Rhythm V paced. This patient was assisted with Intentional Toileting every 2 hours during this shift as appropriate. Documentation of ambulation and output reflected on Flowsheet as appropriate. Purposeful hourly rounding was completed using AIDET and 5Ps. Outcomes of PHR documented as they occurred. Bed alarm in use as appropriate. Dual Suction and ambubag in place. 46- RN stopped cnt. Infusion due to expiratory wheezing/crackles audible on auscultation. Pt. Unable to cough up secretions. Will make provider aware. Bedside and Verbal shift change report given to Cortez Huggins (oncoming nurse) by Zhao Price (offgoing nurse). Report included the following information SBAR, Kardex and Cardiac Rhythm V Paced.

## 2021-08-29 LAB
ALBUMIN SERPL-MCNC: 3 G/DL (ref 3.5–5)
ALBUMIN/GLOB SERPL: 0.8 {RATIO} (ref 1.1–2.2)
ALP SERPL-CCNC: 60 U/L (ref 45–117)
ALT SERPL-CCNC: 16 U/L (ref 12–78)
ANION GAP SERPL CALC-SCNC: 6 MMOL/L (ref 5–15)
AST SERPL-CCNC: 17 U/L (ref 15–37)
BASOPHILS # BLD: 0 K/UL (ref 0–0.1)
BASOPHILS NFR BLD: 0 % (ref 0–1)
BILIRUB SERPL-MCNC: 1.1 MG/DL (ref 0.2–1)
BUN SERPL-MCNC: 38 MG/DL (ref 6–20)
BUN/CREAT SERPL: 28 (ref 12–20)
CALCIUM SERPL-MCNC: 9.1 MG/DL (ref 8.5–10.1)
CHLORIDE SERPL-SCNC: 115 MMOL/L (ref 97–108)
CO2 SERPL-SCNC: 22 MMOL/L (ref 21–32)
CREAT SERPL-MCNC: 1.34 MG/DL (ref 0.55–1.02)
DIFFERENTIAL METHOD BLD: ABNORMAL
EOSINOPHIL # BLD: 0.1 K/UL (ref 0–0.4)
EOSINOPHIL NFR BLD: 1 % (ref 0–7)
ERYTHROCYTE [DISTWIDTH] IN BLOOD BY AUTOMATED COUNT: 14.3 % (ref 11.5–14.5)
GLOBULIN SER CALC-MCNC: 3.6 G/DL (ref 2–4)
GLUCOSE BLD STRIP.AUTO-MCNC: 117 MG/DL (ref 65–117)
GLUCOSE BLD STRIP.AUTO-MCNC: 128 MG/DL (ref 65–117)
GLUCOSE BLD STRIP.AUTO-MCNC: 144 MG/DL (ref 65–117)
GLUCOSE BLD STRIP.AUTO-MCNC: 93 MG/DL (ref 65–117)
GLUCOSE SERPL-MCNC: 122 MG/DL (ref 65–100)
HCT VFR BLD AUTO: 30.7 % (ref 35–47)
HGB BLD-MCNC: 9.7 G/DL (ref 11.5–16)
IMM GRANULOCYTES # BLD AUTO: 0.1 K/UL (ref 0–0.04)
IMM GRANULOCYTES NFR BLD AUTO: 1 % (ref 0–0.5)
LYMPHOCYTES # BLD: 1.9 K/UL (ref 0.8–3.5)
LYMPHOCYTES NFR BLD: 16 % (ref 12–49)
MCH RBC QN AUTO: 29 PG (ref 26–34)
MCHC RBC AUTO-ENTMCNC: 31.6 G/DL (ref 30–36.5)
MCV RBC AUTO: 91.6 FL (ref 80–99)
MONOCYTES # BLD: 0.9 K/UL (ref 0–1)
MONOCYTES NFR BLD: 8 % (ref 5–13)
NEUTS SEG # BLD: 8.9 K/UL (ref 1.8–8)
NEUTS SEG NFR BLD: 75 % (ref 32–75)
NRBC # BLD: 0 K/UL (ref 0–0.01)
NRBC BLD-RTO: 0 PER 100 WBC
PLATELET # BLD AUTO: 118 K/UL (ref 150–400)
POTASSIUM SERPL-SCNC: 3.5 MMOL/L (ref 3.5–5.1)
PROT SERPL-MCNC: 6.6 G/DL (ref 6.4–8.2)
RBC # BLD AUTO: 3.35 M/UL (ref 3.8–5.2)
SERVICE CMNT-IMP: ABNORMAL
SERVICE CMNT-IMP: ABNORMAL
SERVICE CMNT-IMP: NORMAL
SERVICE CMNT-IMP: NORMAL
SODIUM SERPL-SCNC: 143 MMOL/L (ref 136–145)
WBC # BLD AUTO: 11.8 K/UL (ref 3.6–11)

## 2021-08-29 PROCEDURE — 36415 COLL VENOUS BLD VENIPUNCTURE: CPT

## 2021-08-29 PROCEDURE — 74011250636 HC RX REV CODE- 250/636: Performed by: FAMILY MEDICINE

## 2021-08-29 PROCEDURE — 74011000258 HC RX REV CODE- 258: Performed by: FAMILY MEDICINE

## 2021-08-29 PROCEDURE — 74011250637 HC RX REV CODE- 250/637: Performed by: INTERNAL MEDICINE

## 2021-08-29 PROCEDURE — 74011636637 HC RX REV CODE- 636/637: Performed by: INTERNAL MEDICINE

## 2021-08-29 PROCEDURE — 74011000250 HC RX REV CODE- 250: Performed by: INTERNAL MEDICINE

## 2021-08-29 PROCEDURE — 74011250636 HC RX REV CODE- 250/636: Performed by: INTERNAL MEDICINE

## 2021-08-29 PROCEDURE — 82962 GLUCOSE BLOOD TEST: CPT

## 2021-08-29 PROCEDURE — 85025 COMPLETE CBC W/AUTO DIFF WBC: CPT

## 2021-08-29 PROCEDURE — 74011250637 HC RX REV CODE- 250/637: Performed by: FAMILY MEDICINE

## 2021-08-29 PROCEDURE — 65660000000 HC RM CCU STEPDOWN

## 2021-08-29 PROCEDURE — 80053 COMPREHEN METABOLIC PANEL: CPT

## 2021-08-29 PROCEDURE — 74011000250 HC RX REV CODE- 250: Performed by: FAMILY MEDICINE

## 2021-08-29 PROCEDURE — 74011636637 HC RX REV CODE- 636/637: Performed by: FAMILY MEDICINE

## 2021-08-29 RX ADMIN — ASPIRIN 81 MG: 81 TABLET, CHEWABLE ORAL at 08:40

## 2021-08-29 RX ADMIN — CLOPIDOGREL BISULFATE 75 MG: 75 TABLET, FILM COATED ORAL at 08:40

## 2021-08-29 RX ADMIN — HEPARIN SODIUM 5000 UNITS: 5000 INJECTION INTRAVENOUS; SUBCUTANEOUS at 13:47

## 2021-08-29 RX ADMIN — Medication 10 ML: at 05:12

## 2021-08-29 RX ADMIN — Medication 10 ML: at 13:48

## 2021-08-29 RX ADMIN — DOXYCYCLINE 100 MG: 100 INJECTION, POWDER, LYOPHILIZED, FOR SOLUTION INTRAVENOUS at 21:46

## 2021-08-29 RX ADMIN — FAMOTIDINE 20 MG: 20 TABLET ORAL at 21:46

## 2021-08-29 RX ADMIN — IPRATROPIUM BROMIDE AND ALBUTEROL 1 PUFF: 20; 100 SPRAY, METERED RESPIRATORY (INHALATION) at 15:14

## 2021-08-29 RX ADMIN — HUMAN INSULIN 18 UNITS: 100 INJECTION, SUSPENSION SUBCUTANEOUS at 08:40

## 2021-08-29 RX ADMIN — METOPROLOL SUCCINATE 25 MG: 25 TABLET, EXTENDED RELEASE ORAL at 08:40

## 2021-08-29 RX ADMIN — QUETIAPINE FUMARATE 100 MG: 100 TABLET ORAL at 08:40

## 2021-08-29 RX ADMIN — CEFEPIME 2 G: 20 INJECTION, POWDER, FOR SOLUTION INTRAVENOUS at 03:39

## 2021-08-29 RX ADMIN — Medication 10 ML: at 21:46

## 2021-08-29 RX ADMIN — DOXYCYCLINE 100 MG: 100 INJECTION, POWDER, LYOPHILIZED, FOR SOLUTION INTRAVENOUS at 08:41

## 2021-08-29 RX ADMIN — LEVOTHYROXINE SODIUM 50 MCG: 0.05 TABLET ORAL at 08:40

## 2021-08-29 RX ADMIN — QUETIAPINE FUMARATE 100 MG: 100 TABLET ORAL at 17:53

## 2021-08-29 RX ADMIN — MONTELUKAST 10 MG: 10 TABLET, FILM COATED ORAL at 08:40

## 2021-08-29 RX ADMIN — CEFEPIME HYDROCHLORIDE 2 G: 2 INJECTION, POWDER, FOR SOLUTION INTRAVENOUS at 17:51

## 2021-08-29 RX ADMIN — ESCITALOPRAM OXALATE 20 MG: 10 TABLET ORAL at 08:40

## 2021-08-29 RX ADMIN — AMLODIPINE BESYLATE 5 MG: 5 TABLET ORAL at 08:40

## 2021-08-29 RX ADMIN — HEPARIN SODIUM 5000 UNITS: 5000 INJECTION INTRAVENOUS; SUBCUTANEOUS at 05:12

## 2021-08-29 RX ADMIN — INSULIN LISPRO 2 UNITS: 100 INJECTION, SOLUTION INTRAVENOUS; SUBCUTANEOUS at 08:40

## 2021-08-29 RX ADMIN — HEPARIN SODIUM 5000 UNITS: 5000 INJECTION INTRAVENOUS; SUBCUTANEOUS at 21:46

## 2021-08-29 NOTE — PROGRESS NOTES
[facility] Dosing Services: 08/29/21  Cefepime dose change per renal protocol  Physician Dr Justo Funes  Indication: CAP/Sepsis  Previous Regimen Cefepime 2 gm IV q24hr   Serum Creatinine Lab Results   Component Value Date/Time    Creatinine 1.34 (H) 08/29/2021 12:39 AM      Creatinine Clearance Estimated Creatinine Clearance: 30.3 mL/min (A) (based on SCr of 1.34 mg/dL (H)).    BUN Lab Results   Component Value Date/Time    BUN 38 (H) 08/29/2021 12:39 AM         Plan: Changed Cefepime to 2 gm IV q12hr  Thank you  Apple Ramachandran, PharmD  [phone]

## 2021-08-29 NOTE — PROGRESS NOTES
BEDSIDE_VERBAL_RECORDED_WRITTEN:[id number]::\"Bedside\"} shift change report given to KATHIA Herrera (oncoming nurse) by Adam Irby RN (offgoing nurse). Report included the following information SBAR, Kardex, MAR, Med Rec Status and Cardiac Rhythm ventricular paced.

## 2021-08-29 NOTE — PROGRESS NOTES
Daily Progress Note: 8/29/2021  Molly Gottron Motsinger, Winnie Cotton MD    Assessment/Plan:   Severe sepsis / septic shock: 2/2 PNA. -CXR showed patchy bilateral airspace disease. Rapid COVID test negative.   -Check RVP/COVID-PCR.   -Start cefepime and azithromycin. -IVF  - Follow cultures.   -Check procalcitonin.   -Send sputum culture, PNA workup (Legionella and Strep Pneumo urine ag, Mycoplasma IgM)     Chronic obstructive pulmonary disease: no wheezing.   -Combivent PRN     Hypertension: now hypotensive.   -Restarted Norvasc and metoprolol 8/28     Hypothyroid:   -check TSH. Continue LT4     CAD (coronary artery disease) / History of heart artery stent:   -cont DAPT, statin.      Diabetes: type 2, associated w/ CKD4.   -Poor control.   -On determir at home.   -Use NPH with SSI here     CKD (chronic kidney disease) stage 4, GFR 15-29 ml/min: stable. Follow BMP     Acute metabolic encephalopathy 2/2 sepsis.   -Has dementia and is at risk for delirium.   -Avoid benzos, opioids, anticholinergics. -Verbally re-direct whenever possible.   -Avoid chemical restraints unless pt is a danger to self or others.  -Haldol as needed  -Wrist restraints as needed when family not present  -Home Seroquel restarted      Thrombocytopenia: chronic, stable.    -Follow PLT     Code Status: DNR     Problem List:  Problem List as of 8/29/2021 Date Reviewed: 8/26/2021        Codes Class Noted - Resolved    [facility] ICD-10-CM: D69.6  ICD-9-CM: 287.5  8/27/2021 - Present        Chronic obstructive pulmonary disease (HCC) ICD-10-CM: J44.9  ICD-9-CM: 80  Unknown - Present        Hypertension ICD-10-CM: I10  ICD-9-CM: 401.9  Unknown - Present        Hypothyroid ICD-10-CM: E03.9  ICD-9-CM: 244.9  Unknown - Present        CAD (coronary artery disease) ICD-10-CM: I25.10  ICD-9-CM: 414.00  Unknown - Present        History of heart artery stent ICD-10-CM: Z95.5  ICD-9-CM: V45.82  Unknown - Present        Diabetes [facility]) ICD-10-CM: E11.9  ICD-9-CM: 250.00  Unknown - Present        CKD (chronic kidney disease) stage 4, GFR 15-29 ml/min ([facility]) ICD-10-CM: N18.4  ICD-9-CM: 050. 4  Unknown - Present        Severe sepsis ([facility]) ICD-10-CM: A41.9, R65.20  ICD-9-CM: 038.9, 995.92  Unknown - Present        Pneumonia ICD-10-CM: J18.9  ICD-9-CM: 542  Unknown - Present        Dementia ([facility] ICD-10-CM: F03.90  ICD-9-CM: 294.20  2021 - Present        Acute metabolic encephalopathy HRX-03-BT: G93.41  ICD-9-CM: 348.31  2021 - Present              Subjective:    79 y.o. female w/ hx of CAD, HTN, DM, CKD4, dementia presenting w/ fever, weakness. Started yesterday, febrile at home, associated with AMS and lethargy per family. Pt also c/o chest pain and cough. ED workup showed 3/4 SIRS with bilateral PNA evident on CXR. Ms. Anthony Reddy is admitted for further evaluation and management. (Dr Costa Velez)    : She is confused this am. Not able to provide any history and family not present. Off pressors. BP stable. : Confused. Has been more agitated during the night requiring restraints. Change antibiotic to Doxy so that we could use Haldol. BP is higher so will restart meds. Ask case management to see.     : Daughter at bedside. She is less confused this am. A little more talkative. Family willing to look at rehab as she needs 24 hour care. Discussed with daughter that she should not be left alone. Her daughter lives out of town and works so her niece has been caring for her.      Review of Systems:   Review of systems not obtained due to patient factors. Objective:   Physical Exam:     Visit Vitals  BP (!) 162/79   Pulse 73   Temp 97.8 °F (36.6 °C)   Resp 19   Ht 5' 2\" (1.575 m)   Wt 155 lb 9.6 oz (70.6 kg)   SpO2 91%   BMI 28.46 kg/m²    O2 Flow Rate (L/min): 2 l/min O2 Device: Nasal cannula  Temp (24hrs), Av.1 °F (36.7 °C), Min:97.4 °F (36.3 °C), Max:99.6 °F (37.6 °C)    No intake/output data recorded.    1901 - 08/29 0700  In: 840 [P.O.:840]  Out: 1550 [Urine:1550]  General:  Confused,  no distress, frail appearing. Head:  Normocephalic, without obvious abnormality, atraumatic. Eyes:  Conjunctivae/corneas clear   Nose: Nares normal. Septum midline. Mucosa normal. No drainage or sinus tenderness. Throat: Lips, mucosa, and tongue moist..   Neck: Supple, symmetrical, trachea midline, no adenopathy, thyroid: no enlargement/tenderness/nodules, no carotid bruit and no JVD. Back:   Symmetric, no curvature. ROM normal. No CVA tenderness. Lungs:   Clear to auscultation bilaterally. Chest wall:  No tenderness or deformity. Heart:  Regular rate and rhythm, S1, S2 normal, no murmur, click, rub or gallop. Abdomen:   Soft, non-tender. Bowel sounds normal. No masses,  No organomegaly. Extremities: no cyanosis or edema. No calf tenderness or cords. Pulses: 2+ and symmetric all extremities. Skin: Skin color, texture, turgor normal. No rashes or lesions   Neurologic: CNII-XII intact. Alert and oriented X 1. Fine motor of hands and fingers normal.   equal.  No cogwheeling or rigidity. Gait not tested at this time. Sensation grossly normal to touch. Gross motor of extremities normal.       Data Review:    Port CXR 8/27/21   INDICATION: fever, cough  EXAM:  AP CHEST RADIOGRAPH  COMPARISON: December 4, 2018  FINDINGS:  AP portable view of the chest demonstrates right subclavian pacemaker. Heart  size is normal. Patchy airspace disease throughout the right lung and left lung  base. No pneumothorax. The osseous structures are unremarkable. IMPRESSION  Patchy bilateral airspace disease.     Recent Days:  Recent Labs     08/29/21  0039 08/27/21  0144 08/26/21  1856   WBC 11.8* 19.7* 20.2*   HGB 9.7* 10.4* 10.9*   HCT 30.7* 32.5* 33.5*   * 97* 105*     Recent Labs     08/29/21  0039 08/27/21  0144 08/26/21  1856    144 141   K 3.5 4.5 4.8   * 111* 106   CO2 22 24 25   * 218* 295*   BUN 38* 41* 45*   CREA 1.34* 2.14* 2.42*   CA 9.1 8.3* 9.0   MG  --  1.6  --    PHOS  --  3.1  --    ALB 3.0* 3.0* 3.2*   TBILI 1.1* 0.6 1.1*   ALT 16 13 13     No results for input(s): PH, PCO2, PO2, HCO3, FIO2 in the last 72 hours. 24 Hour Results:  Recent Results (from the past 24 hour(s))   GLUCOSE, POC    Collection Time: 08/28/21 11:37 AM   Result Value Ref Range    Glucose (POC) 214 (H) 65 - 117 mg/dL    Performed by [address], POC    Collection Time: 08/28/21  4:13 PM   Result Value Ref Range    Glucose (POC) 126 (H) 65 - 117 mg/dL    Performed by [address], POC    Collection Time: 08/28/21  8:59 PM   Result Value Ref Range    Glucose (POC) 130 (H) 65 - 117 mg/dL    Performed by Trenton Galvan    CBC WITH AUTOMATED DIFF    Collection Time: 08/29/21 12:39 AM   Result Value Ref Range    WBC 11.8 (H) 3.6 - 11.0 K/uL    RBC 3.35 (L) 3.80 - 5.20 M/uL    HGB 9.7 (L) 11.5 - 16.0 g/dL    HCT 30.7 (L) 35.0 - 47.0 %    MCV 91.6 80.0 - 99.0 FL    MCH 29.0 26.0 - 34.0 PG    MCHC 31.6 30.0 - 36.5 g/dL    RDW 14.3 11.5 - 14.5 %    PLATELET 470 (L) 684 - 400 K/uL    NRBC 0.0 0  WBC    ABSOLUTE NRBC 0.00 0.00 - 0.01 K/uL    NEUTROPHILS 75 32 - 75 %    LYMPHOCYTES 16 12 - 49 %    MONOCYTES 8 5 - 13 %    EOSINOPHILS 1 0 - 7 %    BASOPHILS 0 0 - 1 %    IMMATURE GRANULOCYTES 1 (H) 0.0 - 0.5 %    ABS. NEUTROPHILS 8.9 (H) 1.8 - 8.0 K/UL    ABS. LYMPHOCYTES 1.9 0.8 - 3.5 K/UL    ABS. MONOCYTES 0.9 0.0 - 1.0 K/UL    ABS. EOSINOPHILS 0.1 0.0 - 0.4 K/UL    ABS. BASOPHILS 0.0 0.0 - 0.1 K/UL    ABS. IMM.  GRANS. 0.1 (H) 0.00 - 0.04 K/UL    DF AUTOMATED     METABOLIC PANEL, COMPREHENSIVE    Collection Time: 08/29/21 12:39 AM   Result Value Ref Range    Sodium 143 136 - 145 mmol/L    Potassium 3.5 3.5 - 5.1 mmol/L    Chloride 115 (H) 97 - 108 mmol/L    CO2 22 21 - 32 mmol/L    Anion gap 6 5 - 15 mmol/L    Glucose 122 (H) 65 - 100 mg/dL    BUN 38 (H) 6 - 20 MG/DL    Creatinine 1.34 (H) 0.55 - 1.02 MG/DL BUN/Creatinine ratio 28 (H) 12 - 20      GFR est AA 46 (L) >60 ml/min/1.73m2    GFR est non-AA 38 (L) >60 ml/min/1.73m2    Calcium 9.1 8.5 - 10.1 MG/DL    Bilirubin, total 1.1 (H) 0.2 - 1.0 MG/DL    ALT (SGPT) 16 12 - 78 U/L    AST (SGOT) 17 15 - 37 U/L    Alk.  phosphatase 60 45 - 117 U/L    Protein, total 6.6 6.4 - 8.2 g/dL    Albumin 3.0 (L) 3.5 - 5.0 g/dL    Globulin 3.6 2.0 - 4.0 g/dL    A-G Ratio 0.8 (L) 1.1 - 2.2     GLUCOSE, POC    Collection Time: 08/29/21  7:58 AM   Result Value Ref Range    Glucose (POC) 144 (H) 65 - 117 mg/dL    Performed by Joanne Quiñonez. (CON)        Medications reviewed  Current Facility-Administered Medications   Medication Dose Route Frequency    aspirin chewable tablet 81 mg  81 mg Oral DAILY    clopidogreL (PLAVIX) tablet 75 mg  75 mg Oral DAILY    levothyroxine (SYNTHROID) tablet 50 mcg  50 mcg Oral ACB    montelukast (SINGULAIR) tablet 10 mg  10 mg Oral DAILY    insulin NPH (NOVOLIN N, HUMULIN N) injection 18 Units  18 Units SubCUTAneous ACB&D    QUEtiapine (SEROquel) tablet 100 mg  100 mg Oral BID    escitalopram oxalate (LEXAPRO) tablet 20 mg  20 mg Oral DAILY    amLODIPine (NORVASC) tablet 5 mg  5 mg Oral DAILY    metoprolol succinate (TOPROL-XL) XL tablet 25 mg  25 mg Oral DAILY    famotidine (PEPCID) tablet 20 mg  20 mg Oral QHS    cefepime (MAXIPIME) 2 g in sterile water (preservative free) 10 mL IV syringe  2 g IntraVENous Q24H    0.9% sodium chloride infusion  75 mL/hr IntraVENous CONTINUOUS    insulin lispro (HUMALOG) injection   SubCUTAneous AC&HS    glucose chewable tablet 16 g  4 Tablet Oral PRN    dextrose (D50W) injection syrg 12.5-25 g  12.5-25 g IntraVENous PRN    glucagon (GLUCAGEN) injection 1 mg  1 mg IntraMUSCular PRN    ipratropium-albuterol (COMBIVENT RESPIMAT) 20 mcg-100 mcg inhalation spray  1 Puff Inhalation Q6H PRN    heparin (porcine) injection 5,000 Units  5,000 Units SubCUTAneous Q8H    haloperidol lactate (HALDOL) injection 1 mg  1 mg IntraMUSCular Q6H PRN    doxycycline (VIBRAMYCIN) 100 mg in 0.9% sodium chloride (MBP/ADV) 100 mL MBP  100 mg IntraVENous Q12H    sodium chloride (NS) flush 5-10 mL  5-10 mL IntraVENous PRN    sodium chloride (NS) flush 5-40 mL  5-40 mL IntraVENous Q8H    sodium chloride (NS) flush 5-40 mL  5-40 mL IntraVENous PRN    acetaminophen (TYLENOL) tablet 650 mg  650 mg Oral Q6H PRN    Or    acetaminophen (TYLENOL) suppository 650 mg  650 mg Rectal Q6H PRN    polyethylene glycol (MIRALAX) packet 17 g  17 g Oral DAILY PRN       Care Plan discussed with: Patient/Family and Nurse    Total time spent with patient: 30 minutes.     Yosvany Freeman MD

## 2021-08-29 NOTE — PROGRESS NOTES
BEDSIDE_VERBAL_RECORDED_WRITTEN:77354::\"Bedside\"} shift change report given to Jordan Sanders RN (oncoming nurse) by Gilda Hooks RN (offgoing nurse). Report included the following information SBAR, Kardex, Intake/Output and Cardiac Rhythm Vpaced.

## 2021-08-30 LAB
ALBUMIN SERPL-MCNC: 3 G/DL (ref 3.5–5)
ALBUMIN/GLOB SERPL: 0.8 {RATIO} (ref 1.1–2.2)
ALP SERPL-CCNC: 57 U/L (ref 45–117)
ALT SERPL-CCNC: 17 U/L (ref 12–78)
ANION GAP SERPL CALC-SCNC: 7 MMOL/L (ref 5–15)
AST SERPL-CCNC: 26 U/L (ref 15–37)
BASOPHILS # BLD: 0 K/UL (ref 0–0.1)
BASOPHILS NFR BLD: 0 % (ref 0–1)
BILIRUB SERPL-MCNC: 1.2 MG/DL (ref 0.2–1)
BUN SERPL-MCNC: 34 MG/DL (ref 6–20)
BUN/CREAT SERPL: 24 (ref 12–20)
CALCIUM SERPL-MCNC: 9.7 MG/DL (ref 8.5–10.1)
CHLORIDE SERPL-SCNC: 113 MMOL/L (ref 97–108)
CO2 SERPL-SCNC: 22 MMOL/L (ref 21–32)
CREAT SERPL-MCNC: 1.42 MG/DL (ref 0.55–1.02)
DIFFERENTIAL METHOD BLD: ABNORMAL
EOSINOPHIL # BLD: 0.3 K/UL (ref 0–0.4)
EOSINOPHIL NFR BLD: 3 % (ref 0–7)
ERYTHROCYTE [DISTWIDTH] IN BLOOD BY AUTOMATED COUNT: 13.6 % (ref 11.5–14.5)
GLOBULIN SER CALC-MCNC: 4 G/DL (ref 2–4)
GLUCOSE BLD STRIP.AUTO-MCNC: 108 MG/DL (ref 65–117)
GLUCOSE BLD STRIP.AUTO-MCNC: 117 MG/DL (ref 65–117)
GLUCOSE BLD STRIP.AUTO-MCNC: 135 MG/DL (ref 65–117)
GLUCOSE BLD STRIP.AUTO-MCNC: 159 MG/DL (ref 65–117)
GLUCOSE BLD STRIP.AUTO-MCNC: 73 MG/DL (ref 65–117)
GLUCOSE BLD STRIP.AUTO-MCNC: 79 MG/DL (ref 65–117)
GLUCOSE BLD STRIP.AUTO-MCNC: 79 MG/DL (ref 65–117)
GLUCOSE SERPL-MCNC: 106 MG/DL (ref 65–100)
HCT VFR BLD AUTO: 34.9 % (ref 35–47)
HGB BLD-MCNC: 10.8 G/DL (ref 11.5–16)
IMM GRANULOCYTES # BLD AUTO: 0.1 K/UL (ref 0–0.04)
IMM GRANULOCYTES NFR BLD AUTO: 1 % (ref 0–0.5)
LYMPHOCYTES # BLD: 1.9 K/UL (ref 0.8–3.5)
LYMPHOCYTES NFR BLD: 20 % (ref 12–49)
MCH RBC QN AUTO: 28.9 PG (ref 26–34)
MCHC RBC AUTO-ENTMCNC: 30.9 G/DL (ref 30–36.5)
MCV RBC AUTO: 93.3 FL (ref 80–99)
MONOCYTES # BLD: 0.9 K/UL (ref 0–1)
MONOCYTES NFR BLD: 9 % (ref 5–13)
NEUTS SEG # BLD: 6.5 K/UL (ref 1.8–8)
NEUTS SEG NFR BLD: 67 % (ref 32–75)
NRBC # BLD: 0 K/UL (ref 0–0.01)
NRBC BLD-RTO: 0 PER 100 WBC
PLATELET # BLD AUTO: 152 K/UL (ref 150–400)
POTASSIUM SERPL-SCNC: 3.5 MMOL/L (ref 3.5–5.1)
PROT SERPL-MCNC: 7 G/DL (ref 6.4–8.2)
RBC # BLD AUTO: 3.74 M/UL (ref 3.8–5.2)
SERVICE CMNT-IMP: ABNORMAL
SERVICE CMNT-IMP: ABNORMAL
SERVICE CMNT-IMP: NORMAL
SODIUM SERPL-SCNC: 142 MMOL/L (ref 136–145)
WBC # BLD AUTO: 9.7 K/UL (ref 3.6–11)

## 2021-08-30 PROCEDURE — 97161 PT EVAL LOW COMPLEX 20 MIN: CPT

## 2021-08-30 PROCEDURE — 74011000258 HC RX REV CODE- 258: Performed by: FAMILY MEDICINE

## 2021-08-30 PROCEDURE — 97116 GAIT TRAINING THERAPY: CPT

## 2021-08-30 PROCEDURE — 74011250637 HC RX REV CODE- 250/637: Performed by: FAMILY MEDICINE

## 2021-08-30 PROCEDURE — 97535 SELF CARE MNGMENT TRAINING: CPT

## 2021-08-30 PROCEDURE — 74011250636 HC RX REV CODE- 250/636: Performed by: FAMILY MEDICINE

## 2021-08-30 PROCEDURE — 85025 COMPLETE CBC W/AUTO DIFF WBC: CPT

## 2021-08-30 PROCEDURE — 36415 COLL VENOUS BLD VENIPUNCTURE: CPT

## 2021-08-30 PROCEDURE — 97530 THERAPEUTIC ACTIVITIES: CPT

## 2021-08-30 PROCEDURE — 80053 COMPREHEN METABOLIC PANEL: CPT

## 2021-08-30 PROCEDURE — 74011636637 HC RX REV CODE- 636/637: Performed by: FAMILY MEDICINE

## 2021-08-30 PROCEDURE — 74011000250 HC RX REV CODE- 250: Performed by: FAMILY MEDICINE

## 2021-08-30 PROCEDURE — 74011250637 HC RX REV CODE- 250/637: Performed by: INTERNAL MEDICINE

## 2021-08-30 PROCEDURE — 74011250636 HC RX REV CODE- 250/636: Performed by: INTERNAL MEDICINE

## 2021-08-30 PROCEDURE — 65660000000 HC RM CCU STEPDOWN

## 2021-08-30 PROCEDURE — 97165 OT EVAL LOW COMPLEX 30 MIN: CPT

## 2021-08-30 PROCEDURE — 82962 GLUCOSE BLOOD TEST: CPT

## 2021-08-30 PROCEDURE — 74011636637 HC RX REV CODE- 636/637: Performed by: INTERNAL MEDICINE

## 2021-08-30 RX ADMIN — METOPROLOL SUCCINATE 25 MG: 25 TABLET, EXTENDED RELEASE ORAL at 08:47

## 2021-08-30 RX ADMIN — LEVOTHYROXINE SODIUM 50 MCG: 0.05 TABLET ORAL at 06:44

## 2021-08-30 RX ADMIN — QUETIAPINE FUMARATE 100 MG: 100 TABLET ORAL at 17:30

## 2021-08-30 RX ADMIN — INSULIN LISPRO 2 UNITS: 100 INJECTION, SOLUTION INTRAVENOUS; SUBCUTANEOUS at 12:47

## 2021-08-30 RX ADMIN — HEPARIN SODIUM 5000 UNITS: 5000 INJECTION INTRAVENOUS; SUBCUTANEOUS at 14:25

## 2021-08-30 RX ADMIN — CEFEPIME HYDROCHLORIDE 2 G: 2 INJECTION, POWDER, FOR SOLUTION INTRAVENOUS at 03:41

## 2021-08-30 RX ADMIN — MONTELUKAST 10 MG: 10 TABLET, FILM COATED ORAL at 08:47

## 2021-08-30 RX ADMIN — HEPARIN SODIUM 5000 UNITS: 5000 INJECTION INTRAVENOUS; SUBCUTANEOUS at 06:44

## 2021-08-30 RX ADMIN — ASPIRIN 81 MG: 81 TABLET, CHEWABLE ORAL at 08:47

## 2021-08-30 RX ADMIN — Medication 10 ML: at 06:44

## 2021-08-30 RX ADMIN — HEPARIN SODIUM 5000 UNITS: 5000 INJECTION INTRAVENOUS; SUBCUTANEOUS at 21:44

## 2021-08-30 RX ADMIN — HUMAN INSULIN 18 UNITS: 100 INJECTION, SUSPENSION SUBCUTANEOUS at 07:30

## 2021-08-30 RX ADMIN — CLOPIDOGREL BISULFATE 75 MG: 75 TABLET, FILM COATED ORAL at 08:47

## 2021-08-30 RX ADMIN — QUETIAPINE FUMARATE 100 MG: 100 TABLET ORAL at 08:47

## 2021-08-30 RX ADMIN — Medication 10 ML: at 21:45

## 2021-08-30 RX ADMIN — DOXYCYCLINE 100 MG: 100 INJECTION, POWDER, LYOPHILIZED, FOR SOLUTION INTRAVENOUS at 08:46

## 2021-08-30 RX ADMIN — AMLODIPINE BESYLATE 5 MG: 5 TABLET ORAL at 08:47

## 2021-08-30 RX ADMIN — Medication 10 ML: at 14:27

## 2021-08-30 RX ADMIN — Medication 10 ML: at 17:26

## 2021-08-30 RX ADMIN — CEFEPIME HYDROCHLORIDE 2 G: 2 INJECTION, POWDER, FOR SOLUTION INTRAVENOUS at 17:23

## 2021-08-30 RX ADMIN — DOXYCYCLINE 100 MG: 100 INJECTION, POWDER, LYOPHILIZED, FOR SOLUTION INTRAVENOUS at 21:44

## 2021-08-30 RX ADMIN — ESCITALOPRAM OXALATE 20 MG: 10 TABLET ORAL at 08:47

## 2021-08-30 RX ADMIN — FAMOTIDINE 20 MG: 20 TABLET ORAL at 22:00

## 2021-08-30 NOTE — PROGRESS NOTES
Problem: Self Care Deficits Care Plan (Adult)  Goal: *Acute Goals and Plan of Care (Insert Text)  Description:   FUNCTIONAL STATUS PRIOR TO ADMISSION: Patient not able to give history and no family present. Per chart patient lives with niece, but was ambulatory without assistance. HOME SUPPORT: The patient lived with family. Occupational Therapy Goals  Initiated 8/30/2021  1. Patient will perform self-feeding with supervision/set-up within 7 day(s). 2.  Patient will perform grooming with supervision/set-up within 7 day(s). 3.  Patient will perform toilet transfers with supervision/set-up within 7 day(s). 4.  Patient will perform all aspects of toileting with supervision/set-up within 7 day(s). 8/30/2021 1504 by Sam Jacobs OT  Outcome: Progressing Towards Goal    OCCUPATIONAL THERAPY EVALUATION  Patient: Catracihta Bianchi (46 y.o. female)  Date: 8/30/2021  Primary [facility]: [facility] [A41.9, R65.20]        Precautions: fall       ASSESSMENT  Based on the objective data described below, the patient presents with hospital admission secondary to increased lethargy, AMS and fever. Patient received supine in bed, agreeable to activity. Patient currently oriented to self only but is pleasant and cooperative. Patient following commands throughout evaluation, but does require tactile cues to come to EOB. Patient mod assist for bed mobility, likely cognitive vs physical.  Patient able to stand with min assist and transfers to chair with same level. Patient set up for grooming tasks in chair and with verbal cues is able to perform washing face. Patient left in NAD in chair, alarm set. Current Level of Function Impacting Discharge (ADLs/self-care): min assist     Functional Outcome Measure: The patient scored 30/100 on the Barthel INDex  outcome measure.       Other factors to consider for discharge: lives with family but unsure if family can continue care     Patient will benefit from skilled therapy intervention to address the above noted impairments. PLAN :  Recommendations and Planned Interventions: self care training, functional mobility training, therapeutic exercise, balance training, therapeutic activities, endurance activities, patient education, home safety training, and family training/education    Frequency/Duration: Patient will be followed by occupational therapy 3 times a week to address goals. Recommendation for discharge: (in order for the patient to meet his/her long term goals)  Therapy up to 5 days/week in SNF setting    This discharge recommendation:  Has not yet been discussed the attending provider and/or case management    IF patient discharges home will need the following DME: TBD       SUBJECTIVE:   Patient stated Yes. 31.    OBJECTIVE DATA SUMMARY:   HISTORY:   Past Medical History:   Diagnosis Date    Chronic obstructive pulmonary disease ([facility]     Diabetes ([facility]     Diverticulitis     [facility])     History of heart artery stent     Hypertension     Hypothyroid     Renal failure      Past Surgical History:   Procedure Laterality Date    HX APPENDECTOMY      HX CYST REMOVAL      HX HYSTERECTOMY      HX IMPLANTABLE CARDIOVERTER DEFIBRILLATOR      HX PACEMAKER         Expanded or extensive additional review of patient history:     Home Situation  Home Environment: Private residence  Living Alone: No  Support Systems: Family member(s)    Hand dominance: Right    EXAMINATION OF PERFORMANCE DEFICITS:  Cognitive/Behavioral Status:  Neurologic State: Alert  Orientation Level: Oriented to person;Disoriented to place; Disoriented to situation;Disoriented to time  Cognition: Follows commands  Perception: Appears intact  Perseveration: Perseverates during conversation  Safety/Judgement:  (decresed safety awareness)    Skin: intact as seen    Edema: none noted     Hearing:   Auditory  Auditory Impairment: None    Vision/Perceptual:                                     Range of Motion:    AROM: Within functional limits                         Strength:    Strength: Generally decreased, functional                Coordination:     Fine Motor Skills-Upper: Left Intact; Right Intact    Gross Motor Skills-Upper: Left Intact; Right Intact    Tone & Sensation:    Tone: Normal  Sensation: Intact                      Balance:  Sitting: Intact  Standing: With support    Functional Mobility and Transfers for ADLs:  Bed Mobility:  Supine to Sit: Moderate assistance  Scooting: Contact guard assistance    Transfers:  Sit to Stand: Minimum assistance  Stand to Sit: Contact guard assistance  Bed to Chair: Minimum assistance  Toilet Transfer : Minimum assistance    ADL Assessment:  Feeding:  (not tested, awaiting SLP)    Oral Facial Hygiene/Grooming: Minimum assistance (able to wash face with set up)    Bathing: Minimum assistance    Upper Body Dressing: Minimum assistance    Lower Body Dressing: Minimum assistance    Toileting: Minimum assistance                ADL Intervention and task modifications:                                     Cognitive Retraining  Safety/Judgement:  (decresed safety awareness)    Therapeutic Exercise:     Functional Measure:  Barthel Index:    Bathin  Bladder: 5  Bowels: 5  Groomin  Dressin  Feedin  Mobility: 0  Stairs: 0  Toilet Use: 5  Transfer (Bed to Chair and Back): 10  Total: 30/100        The Barthel ADL Index: Guidelines  1. The index should be used as a record of what a patient does, not as a record of what a patient could do. 2. The main aim is to establish degree of independence from any help, physical or verbal, however minor and for whatever reason. 3. The need for supervision renders the patient not independent. 4. A patient's performance should be established using the best available evidence.  Asking the patient, friends/relatives and nurses are the usual sources, but direct observation and common sense are also important. However direct testing is not needed. 5. Usually the patient's performance over the preceding 24-48 hours is important, but occasionally longer periods will be relevant. 6. Middle categories imply that the patient supplies over 50 per cent of the effort. 7. Use of aids to be independent is allowed. Arnaud Ese., Barthel, D.W. (1256). Functional evaluation: the Barthel Index. [address] (14)2. MARILOU Bobby, Haroldo Cantu., Idelia Decree., [place], [address] [place] [address] Ave (1999). Measuring the change indisability after inpatient rehabilitation; comparison of the responsiveness of the Barthel Index and Functional [place] Measure. Journal of Neurology, Neurosurgery, and Psychiatry, 66(4), 587-272. KAYLIN Lind, BARNEY Mart, & Rosie Barrientos M.A. (2004.) Assessment of post-stroke quality of life in cost-effectiveness studies: The usefulness of the Barthel Index and the EuroQoL-5D. Quality of Life Research, 15, 549-13         Occupational Therapy Evaluation Charge Determination   History Examination Decision-Making   LOW Complexity : Brief history review  LOW Complexity : 1-3 performance deficits relating to physical, cognitive , or psychosocial skils that result in activity limitations and / or participation restrictions  LOW Complexity : No comorbidities that affect functional and no verbal or physical assistance needed to complete eval tasks       Based on the above components, the patient evaluation is determined to be of the following complexity level: LOW   Pain Rating:      Activity Tolerance:   Good    After treatment patient left in no apparent distress:    Sitting in chair, Call bell within reach, and Bed / chair alarm activated    COMMUNICATION/EDUCATION:   The patients plan of care was discussed with: Physical therapist and Registered nurse. Patient is unable to participate in goal setting and plan of care.     This patients plan of care is appropriate for delegation to [facility].     Thank you for this referral.  Nils Cano, OTR/L  Time Calculation: 24 mins

## 2021-08-30 NOTE — PROGRESS NOTES
Transition of Care Plan:        1. Long term planning-pt needs a [facility], to be screened for Medicaid; will need LTC  2. [facility] following--pt will need auth  3. PT/OT/SLP following  4. Hospice/ Palliative consult; info session with [facility]  5. CM to follow for discharge needs  6. Great-niece is Donnavalentina Rogers at [phone]  THANG Ramirez

## 2021-08-30 NOTE — PROGRESS NOTES
Pt's blood sugar @1621: 79 @ 1622 recheck: 73. Given orange juice. Recheck @ [phone]: 78. Meal delivered recheck blood sugar: 108 @ 1715. Scheduled to receive NPH 18 units for blood sugar. Notified Dr. Jayla Vick of the above. New orders received to hold NPH.

## 2021-08-30 NOTE — PROGRESS NOTES
Bedside and Verbal shift change report given to Abril Lowe (oncoming nurse) by Raeann Schaefer (offgoing nurse).  Report included the following information SBAR, Kardex, Intake/Output, MAR, Recent Results and Cardiac Rhythm SR.

## 2021-08-30 NOTE — PROGRESS NOTES
Problem: Mobility Impaired (Adult and Pediatric)  Goal: *Acute Goals and Plan of Care (Insert Text)  Description: FUNCTIONAL STATUS PRIOR TO ADMISSION: Patient was modified independent using a rolling walker for functional mobility. HOME SUPPORT PRIOR TO ADMISSION: The patient lived with daughter but did not require assist.    Physical Therapy Goals  Initiated 8/30/2021  1. Patient will move from supine to sit and sit to supine , scoot up and down, and roll side to side in bed with independence within 7 day(s). 2.  Patient will transfer from bed to chair and chair to bed with modified independence using the least restrictive device within 7 day(s). 3.  Patient will perform sit to stand with modified independence within 7 day(s). 4.  Patient will ambulate with modified independence for 200 feet with the least restrictive device within 7 day(s). 8/30/2021 1643 by Jovani Alexander PT  Outcome: Progressing Towards Goal   PHYSICAL THERAPY EVALUATION  Patient: Aubree Pearce (67 y.o. female)  Date: 8/30/2021  Primary Diagnosis: Severe sepsis ([facility] [A41.9, R65.20]        Precautions: fall       ASSESSMENT  Based on the objective data described below, the patient presents with generalized weakness and debility. Communicated with nurse cleared for therapy. Patient supine on bed oriented to self only. Rolled on the edge of bed mod assist, supine to sit mod assist, sit to stand min assist, ambulate with rolling walker mod assist towards the recliner. OOB to chair as tolerated performed some active range of motion exercise on both LE all planes. Activated chair alarm and notified nurse who agreed to monitor patient. Current Level of Function Impacting Discharge (mobility/balance): mod assist with transfers and ambulation using a rolling walker    Functional Outcome Measure: The patient scored 30/100 on the barthel index outcome measure.       Other factors to consider for discharge: fall Patient will benefit from skilled therapy intervention to address the above noted impairments. PLAN :  Recommendations and Planned Interventions: bed mobility training, transfer training, gait training, therapeutic exercises, neuromuscular re-education, orthotic/prosthetic training, patient and family training/education, and therapeutic activities      Frequency/Duration: Patient will be followed by physical therapy:  5 times a week to address goals. Recommendation for discharge: (in order for the patient to meet his/her long term goals)  Therapy up to 5 days/week in SNF setting    This discharge recommendation:  Has been made in collaboration with the attending provider and/or case management    IF patient discharges home will need the following DME: to be determined (TBD)         SUBJECTIVE:   Patient stated 31.     OBJECTIVE DATA SUMMARY:   HISTORY:    Past Medical History:   Diagnosis Date    Chronic obstructive pulmonary disease ([facility]     Diabetes ([facility]     Diverticulitis     DVT (deep venous thrombosis) (HCC)     History of heart artery stent     Hypertension     Hypothyroid     Renal failure      Past Surgical History:   Procedure Laterality Date    HX APPENDECTOMY      HX CYST REMOVAL      HX HYSTERECTOMY      HX IMPLANTABLE CARDIOVERTER DEFIBRILLATOR      HX PACEMAKER         Personal factors and/or comorbidities impacting plan of care:     Home Situation  Home Environment: Private residence  Living Alone: No  Support Systems: Family member(s)    EXAMINATION/PRESENTATION/DECISION MAKING:   Critical Behavior:  Neurologic State: Alert  Orientation Level: Disoriented X4  Cognition: Follows commands  Safety/Judgement:  (decresed safety awareness)  Hearing:   Auditory  Auditory Impairment: None    Range Of Motion:  AROM: Within functional limits                       Strength:    Strength: Generally decreased, functional                    Tone & Sensation:   Tone: Normal Sensation: Intact               Coordination:     Vision:      Functional Mobility:  Bed Mobility:  Rolling: Moderate assistance  Supine to Sit: Moderate assistance  Sit to Supine: Moderate assistance  Scooting: Contact guard assistance  Transfers:  Sit to Stand: Minimum assistance  Stand to Sit: Contact guard assistance  Stand Pivot Transfers: Minimum assistance     Bed to Chair: Minimum assistance              Balance:   Sitting: Intact  Standing: With support; Impaired  Standing - Static: Fair  Standing - Dynamic : Fair  Ambulation/Gait Training:  Distance (ft): 10 Feet (ft)  Assistive Device: Walker, rolling;Gait belt  Ambulation - Level of Assistance: Moderate assistance     Gait Description (WDL): Exceptions to [facility]           Base of Support: Widened     Speed/Rosa Maria: Fluctuations; Slow  Step Length: Right shortened;Left shortened                         Therapeutic Exercises:   Educate and instructed patient to continue active range of motion exercise on both legs while up on chair or on bed multiple times. Recommend patient to be up on the chair at least 3 times a day every meal times as tolerated. Functional Measure:  Barthel Index:    Bathin  Bladder: 5  Bowels: 5  Groomin  Dressin  Feedin  Mobility: 0  Stairs: 0  Toilet Use: 5  Transfer (Bed to Chair and Back): 10  Total: 30/100       The Barthel ADL Index: Guidelines  1. The index should be used as a record of what a patient does, not as a record of what a patient could do. 2. The main aim is to establish degree of independence from any help, physical or verbal, however minor and for whatever reason. 3. The need for supervision renders the patient not independent. 4. A patient's performance should be established using the best available evidence. Asking the patient, friends/relatives and nurses are the usual sources, but direct observation and common sense are also important. However direct testing is not needed.   5. Usually the patient's performance over the preceding 24-48 hours is important, but occasionally longer periods will be relevant. 6. Middle categories imply that the patient supplies over 50 per cent of the effort. 7. Use of aids to be independent is allowed. Dora Dawson., Barthel, D.W. ([id number]). Functional evaluation: the Barthel Index. [facility] (14)2. OksanaMARILOU Abdullahi, Bobbi Gaona., [facility].[place], [address] [place] [address] Ave (). Measuring the change indisability after inpatient rehabilitation; comparison of the responsiveness of the Barthel Index and Functional [place] Measure. Journal of Neurology, Neurosurgery, and Psychiatry, 66(4), 473-019. KAYLIN Ramsey, BARNEY Mart, & Paulina Collier M.A. (2004.) Assessment of post-stroke quality of life in cost-effectiveness studies: The usefulness of the Barthel Index and the EuroQoL-5D. Quality of Life Research, 15, 234-91           Physical Therapy Evaluation Charge Determination   History Examination Presentation Decision-Making   LOW Complexity : Zero comorbidities / personal factors that will impact the outcome / POC LOW Complexity : 1-2 Standardized tests and measures addressing body structure, function, activity limitation and / or participation in recreation  LOW Complexity : Stable, uncomplicated  Other outcome measures barthel  LOW       Based on the above components, the patient evaluation is determined to be of the following complexity level: LOW     Pain Ratin/10    Activity Tolerance:   Good    After treatment patient left in no apparent distress:   Sitting in chair, Heels elevated for pressure relief, Call bell within reach, and Bed / chair alarm activated    COMMUNICATION/EDUCATION:   The patients plan of care was discussed with: Occupational therapist, Registered nurse, and Case management. Fall prevention education was provided and the patient/caregiver indicated understanding.     Thank you for this referral.  Beverly Sheikh MARSHALL [facility].    Time Calculation: 28 mins

## 2021-08-30 NOTE — PROGRESS NOTES
Daily Progress Note: 8/30/2021  Megan Post MD    Assessment/Plan:   Severe sepsis / septic shock: 2/2 PNA. -CXR showed patchy bilateral airspace disease. Rapid COVID test negative.   -Check RVP/COVID-PCR. - Negative  -Start cefepime and Doxy  -IVF  -BC neg  -Procalcitonin elevated  -Send sputum culture, PNA workup (Legionella and Strep Pneumo urine ag, Mycoplasma IgM)     Chronic obstructive pulmonary disease: no wheezing.   -Combivent PRN     Hypertension: now hypotensive.   -Restarted Norvasc and metoprolol 8/28     Hypothyroid:   -check TSH. Continue LT4     CAD (coronary artery disease) / History of heart artery stent:   -cont DAPT, statin.      Diabetes: type 2, associated w/ CKD4.   -Poor control.   -On determir at home.   -Use NPH with SSI here     CKD (chronic kidney disease) stage 4, GFR 15-29 ml/min: stable. Follow BMP     Acute metabolic encephalopathy 2/2 sepsis.   -Has dementia and is at risk for delirium.   -Avoid benzos, opioids, anticholinergics. -Verbally re-direct whenever possible.   -Avoid chemical restraints unless pt is a danger to self or others.  -Haldol as needed  -Wrist restraints as needed when family not present  -Home Seroquel restarted      Thrombocytopenia: chronic, stable.    -Follow PLT     Code Status: DNR     Problem List:  Problem List as of 8/30/2021 Date Reviewed: 8/26/2021        Codes Class Noted - Resolved    Thrombocytopenia ([facility] ICD-10-CM: D69.6  ICD-9-CM: 287.5  8/27/2021 - Present        Chronic obstructive pulmonary disease (HCC) ICD-10-CM: J44.9  ICD-9-CM: 80  Unknown - Present        Hypertension ICD-10-CM: I10  ICD-9-CM: 401.9  Unknown - Present        Hypothyroid ICD-10-CM: E03.9  ICD-9-CM: 244.9  Unknown - Present        CAD (coronary artery disease) ICD-10-CM: I25.10  ICD-9-CM: 414.00  Unknown - Present        History of heart artery stent ICD-10-CM: Z95.5  ICD-9-CM: V45.82  Unknown - Present        Diabetes ([facility] ICD-10-CM: E11.9  ICD-9-CM: 250.00  Unknown - Present        CKD (chronic kidney disease) stage 4, GFR 15-29 ml/min (HCC) ICD-10-CM: N18.4  ICD-9-CM: 797. 4  Unknown - Present        Severe sepsis (HCC) ICD-10-CM: A41.9, R65.20  ICD-9-CM: 038.9, 995.92  Unknown - Present        Pneumonia ICD-10-CM: J18.9  ICD-9-CM: 183  Unknown - Present        Dementia ([facility] ICD-10-CM: F03.90  ICD-9-CM: 294.20  8/26/2021 - Present        Acute metabolic encephalopathy TNZ-09-YD: G93.41  ICD-9-CM: 348.31  8/26/2021 - Present              Subjective:    79 y.o. female w/ hx of CAD, HTN, DM, CKD4, dementia presenting w/ fever, weakness. Started yesterday, febrile at home, associated with AMS and lethargy per family. Pt also c/o chest pain and cough. ED workup showed 3/4 SIRS with bilateral PNA evident on CXR. Ms. Shaggy Lilly is admitted for further evaluation and management. (Dr Elinor Canchola)    8/27: She is confused this am. Not able to provide any history and family not present. Off pressors. BP stable. 8/28: Confused. Has been more agitated during the night requiring restraints. Change antibiotic to Doxy so that we could use Haldol. BP is higher so will restart meds. Ask case management to see.     8/29: Daughter at bedside. She is less confused this am. A little more talkative. Family willing to look at rehab as she needs 24 hour care. Discussed with daughter that she should not be left alone. Her daughter lives out of town and works so her niece has been caring for her. 8/30:Sleeping but arouses. Nurses report she had a good night. WBC and Cr have improved. Afebrile. Case management following.      Review of Systems:   Review of systems not obtained due to patient factors.     Objective:   Physical Exam:     Visit Vitals  BP (!) 158/76 (BP 1 Location: Right upper arm, BP Patient Position: At rest)   Pulse 68   Temp 98.2 °F (36.8 °C)   Resp 16   Ht 5' 2\" (1.575 m)   Wt 164 lb 4.8 oz (74.5 kg)   SpO2 91%   BMI 30.05 kg/m²    O2 Flow Rate (L/min): 2 l/min O2 Device: Nasal cannula  Temp (24hrs), Av.1 °F (36.7 °C), Min:97.8 °F (36.6 °C), Max:98.3 °F (36.8 °C)    1901 -  07  In: -   Out: 550 [Urine:550]   701 - 1900  In: 840 [P.O.:840]  Out: 1250 [Urine:1250]  General:  Confused,  no distress, frail appearing. Head:  Normocephalic, without obvious abnormality, atraumatic. Eyes:  Conjunctivae/corneas clear   Nose: Nares normal. Septum midline. Mucosa normal. No drainage or sinus tenderness. Throat: Lips, mucosa, and tongue moist..   Neck: Supple, symmetrical, trachea midline, no adenopathy, thyroid: no enlargement/tenderness/nodules, no carotid bruit and no JVD. Back:   Symmetric, no curvature. ROM normal. No CVA tenderness. Lungs:   Clear to auscultation bilaterally. Chest wall:  No tenderness or deformity. Heart:  Regular rate and rhythm, S1, S2 normal, no murmur, click, rub or gallop. Abdomen:   Soft, non-tender. Bowel sounds normal. No masses,  No organomegaly. Extremities: no cyanosis or edema. No calf tenderness or cords. Pulses: 2+ and symmetric all extremities. Skin: Skin color, texture, turgor normal. No rashes or lesions   Neurologic: CNII-XII intact. Alert and oriented X 1. Fine motor of hands and fingers normal.   equal.  No cogwheeling or rigidity. Gait not tested at this time. Sensation grossly normal to touch. Gross motor of extremities normal.       Data Review:    Port CXR 21   INDICATION: fever, cough  EXAM:  AP CHEST RADIOGRAPH  COMPARISON: 2018  FINDINGS:  AP portable view of the chest demonstrates right subclavian pacemaker. Heart  size is normal. Patchy airspace disease throughout the right lung and left lung  base. No pneumothorax. The osseous structures are unremarkable. IMPRESSION  Patchy bilateral airspace disease.     Portable CXR 21  IMPRESSION  Improved aeration the right midlung and lower lung zones with  residual patchy airspace opacity. Small left pleural effusion. Recent Days:  Recent Labs     08/30/21  0326 08/29/21  0039   WBC 9.7 11.8*   HGB 10.8* 9.7*   HCT 34.9* 30.7*    118*     Recent Labs     08/29/21  0039      K 3.5   *   CO2 22   *   BUN 38*   CREA 1.34*   CA 9.1   ALB 3.0*   TBILI 1.1*   ALT 16     No results for input(s): PH, PCO2, PO2, HCO3, FIO2 in the last 72 hours. 24 Hour Results:  Recent Results (from the past 24 hour(s))   GLUCOSE, POC    Collection Time: 08/29/21  7:58 AM   Result Value Ref Range    Glucose (POC) 144 (H) 65 - 117 mg/dL    Performed by Lucy Griggs. (CON)    GLUCOSE, POC    Collection Time: 08/29/21 11:44 AM   Result Value Ref Range    Glucose (POC) 117 65 - 117 mg/dL    Performed by Lucy Griggs. (CON)    GLUCOSE, POC    Collection Time: 08/29/21  5:28 PM   Result Value Ref Range    Glucose (POC) 93 65 - 117 mg/dL    Performed by Lucy Griggs. (CON)    GLUCOSE, POC    Collection Time: 08/29/21  8:58 PM   Result Value Ref Range    Glucose (POC) 128 (H) 65 - 117 mg/dL    Performed by Sara Daley (PCT)    CBC WITH AUTOMATED DIFF    Collection Time: 08/30/21  3:26 AM   Result Value Ref Range    WBC 9.7 3.6 - 11.0 K/uL    RBC 3.74 (L) 3.80 - 5.20 M/uL    HGB 10.8 (L) 11.5 - 16.0 g/dL    HCT 34.9 (L) 35.0 - 47.0 %    MCV 93.3 80.0 - 99.0 FL    MCH 28.9 26.0 - 34.0 PG    MCHC 30.9 30.0 - 36.5 g/dL    RDW 13.6 11.5 - 14.5 %    PLATELET 964 512 - 899 K/uL    NRBC 0.0 0  WBC    ABSOLUTE NRBC 0.00 0.00 - 0.01 K/uL    NEUTROPHILS 67 32 - 75 %    LYMPHOCYTES 20 12 - 49 %    MONOCYTES 9 5 - 13 %    EOSINOPHILS 3 0 - 7 %    BASOPHILS 0 0 - 1 %    IMMATURE GRANULOCYTES 1 (H) 0.0 - 0.5 %    ABS. NEUTROPHILS 6.5 1.8 - 8.0 K/UL    ABS. LYMPHOCYTES 1.9 0.8 - 3.5 K/UL    ABS. MONOCYTES 0.9 0.0 - 1.0 K/UL    ABS. EOSINOPHILS 0.3 0.0 - 0.4 K/UL    ABS. BASOPHILS 0.0 0.0 - 0.1 K/UL    ABS. IMM.  GRANS. 0.1 (H) 0.00 - 0.04 K/UL    DF AUTOMATED         Medications reviewed  Current Facility-Administered Medications   Medication Dose Route Frequency    cefepime (MAXIPIME) 2 g in sterile water (preservative free) 10 mL IV syringe  2 g IntraVENous Q12H    aspirin chewable tablet 81 mg  81 mg Oral DAILY    clopidogreL (PLAVIX) tablet 75 mg  75 mg Oral DAILY    levothyroxine (SYNTHROID) tablet 50 mcg  50 mcg Oral ACB    montelukast (SINGULAIR) tablet 10 mg  10 mg Oral DAILY    insulin NPH (NOVOLIN N, HUMULIN N) injection 18 Units  18 Units SubCUTAneous ACB&D    QUEtiapine (SEROquel) tablet 100 mg  100 mg Oral BID    escitalopram oxalate (LEXAPRO) tablet 20 mg  20 mg Oral DAILY    amLODIPine (NORVASC) tablet 5 mg  5 mg Oral DAILY    metoprolol succinate (TOPROL-XL) XL tablet 25 mg  25 mg Oral DAILY    famotidine (PEPCID) tablet 20 mg  20 mg Oral QHS    0.9% sodium chloride infusion  50 mL/hr IntraVENous CONTINUOUS    insulin lispro (HUMALOG) injection   SubCUTAneous AC&HS    glucose chewable tablet 16 g  4 Tablet Oral PRN    dextrose (D50W) injection syrg 12.5-25 g  12.5-25 g IntraVENous PRN    glucagon (GLUCAGEN) injection 1 mg  1 mg IntraMUSCular PRN    ipratropium-albuterol (COMBIVENT RESPIMAT) 20 mcg-100 mcg inhalation spray  1 Puff Inhalation Q6H PRN    heparin (porcine) injection 5,000 Units  5,000 Units SubCUTAneous Q8H    haloperidol lactate (HALDOL) injection 1 mg  1 mg IntraMUSCular Q6H PRN    doxycycline (VIBRAMYCIN) 100 mg in 0.9% sodium chloride (MBP/ADV) 100 mL MBP  100 mg IntraVENous Q12H    sodium chloride (NS) flush 5-10 mL  5-10 mL IntraVENous PRN    sodium chloride (NS) flush 5-40 mL  5-40 mL IntraVENous Q8H    sodium chloride (NS) flush 5-40 mL  5-40 mL IntraVENous PRN    acetaminophen (TYLENOL) tablet 650 mg  650 mg Oral Q6H PRN    Or    acetaminophen (TYLENOL) suppository 650 mg  650 mg Rectal Q6H PRN    polyethylene glycol (MIRALAX) packet 17 g  17 g Oral DAILY PRN       Care Plan discussed with: Patient/Family and Nurse    Total time spent with patient: 30 minutes.     Kimmy Harrell MD

## 2021-08-31 ENCOUNTER — APPOINTMENT (OUTPATIENT)
Dept: NON INVASIVE DIAGNOSTICS | Age: 81
DRG: 871 | End: 2021-08-31
Attending: FAMILY MEDICINE
Payer: MEDICARE

## 2021-08-31 ENCOUNTER — APPOINTMENT (OUTPATIENT)
Dept: GENERAL RADIOLOGY | Age: 81
DRG: 871 | End: 2021-08-31
Attending: FAMILY MEDICINE
Payer: MEDICARE

## 2021-08-31 LAB
ECHO AO ROOT DIAM: 3.16 CM
ECHO AV AREA PEAK VELOCITY: 0.68 CM2
ECHO AV AREA VTI: 0.61 CM2
ECHO AV AREA/BSA PEAK VELOCITY: 0.4 CM2/M2
ECHO AV AREA/BSA VTI: 0.4 CM2/M2
ECHO AV MEAN GRADIENT: 18.22 MMHG
ECHO AV PEAK GRADIENT: 32.12 MMHG
ECHO AV PEAK VELOCITY: 283.37 CM/S
ECHO AV VTI: 66.9 CM
ECHO EST RA PRESSURE: 3 MMHG
ECHO LA AREA 4C: 19.39 CM2
ECHO LA MAJOR AXIS: 3.48 CM
ECHO LA MINOR AXIS: 2 CM
ECHO LA VOL 2C: 39.07 ML (ref 22–52)
ECHO LA VOL 4C: 57.15 ML (ref 22–52)
ECHO LA VOL BP: 52.66 ML (ref 22–52)
ECHO LA VOL/BSA BIPLANE: 30.26 ML/M2 (ref 16–28)
ECHO LA VOLUME INDEX A2C: 22.45 ML/M2 (ref 16–28)
ECHO LA VOLUME INDEX A4C: 32.84 ML/M2 (ref 16–28)
ECHO LV E' LATERAL VELOCITY: 5.71 CENTIMETER/SECOND
ECHO LV E' SEPTAL VELOCITY: 4.19 CENTIMETER/SECOND
ECHO LV INTERNAL DIMENSION DIASTOLIC: 4.66 CM (ref 3.9–5.3)
ECHO LV INTERNAL DIMENSION SYSTOLIC: 2.93 CM
ECHO LV IVSD: 1.13 CM (ref 0.6–0.9)
ECHO LV MASS 2D: 156.7 G (ref 67–162)
ECHO LV MASS INDEX 2D: 90.1 G/M2 (ref 43–95)
ECHO LV POSTERIOR WALL DIASTOLIC: 0.82 CM (ref 0.6–0.9)
ECHO LVOT DIAM: 1.8 CM
ECHO LVOT PEAK GRADIENT: 2.34 MMHG
ECHO LVOT PEAK VELOCITY: 75.42 CM/S
ECHO LVOT SV: 40.5 ML
ECHO LVOT VTI: 15.95 CM
ECHO MV A VELOCITY: 168.53 CENTIMETER/SECOND
ECHO MV AREA PHT: 2.61 CM2
ECHO MV AREA VTI: 0.77 CM2
ECHO MV E DECELERATION TIME (DT): 291.12 MS
ECHO MV E VELOCITY: 93.5 CENTIMETER/SECOND
ECHO MV MAX VELOCITY: 210.63 CM/S
ECHO MV MEAN GRADIENT: 5.62 MMHG
ECHO MV PEAK GRADIENT: 17.75 MMHG
ECHO MV PRESSURE HALF TIME (PHT): 84.42 MS
ECHO MV VTI: 52.79 CM
ECHO PV PEAK INSTANTANEOUS GRADIENT SYSTOLIC: 3.38 MMHG
ECHO RIGHT VENTRICULAR SYSTOLIC PRESSURE (RVSP): 35.78 MMHG
ECHO RV INTERNAL DIMENSION: 3.68 CM
ECHO RV TAPSE: 2.08 CM (ref 1.5–2)
ECHO TV REGURGITANT MAX VELOCITY: 286.25 CM/S
ECHO TV REGURGITANT PEAK GRADIENT: 32.78 MMHG
FLUID CULTURE, SPNG2: NORMAL
GLUCOSE BLD STRIP.AUTO-MCNC: 105 MG/DL (ref 65–117)
GLUCOSE BLD STRIP.AUTO-MCNC: 130 MG/DL (ref 65–117)
GLUCOSE BLD STRIP.AUTO-MCNC: 140 MG/DL (ref 65–117)
GLUCOSE BLD STRIP.AUTO-MCNC: 95 MG/DL (ref 65–117)
L PNEUMO1 AG UR QL IA: NEGATIVE
LA VOL DISK BP: 48.83 ML (ref 22–52)
LVOT MG: 1.22 MMHG
M PNEUMO IGG SER IA-ACNC: <100 U/ML (ref 0–99)
M PNEUMO IGM SER IA-ACNC: <770 U/ML (ref 0–769)
ORGANISM ID, SPNG3: NORMAL
PLEASE NOTE, SPNG4: NORMAL
S PNEUM AG SPEC QL LA: NEGATIVE
SERVICE CMNT-IMP: ABNORMAL
SERVICE CMNT-IMP: ABNORMAL
SERVICE CMNT-IMP: NORMAL
SERVICE CMNT-IMP: NORMAL
SPECIMEN SOURCE: NORMAL
SPECIMEN SOURCE: NORMAL
SPECIMEN, SPNG1: NORMAL

## 2021-08-31 PROCEDURE — 74011250636 HC RX REV CODE- 250/636: Performed by: FAMILY MEDICINE

## 2021-08-31 PROCEDURE — 74011000250 HC RX REV CODE- 250: Performed by: FAMILY MEDICINE

## 2021-08-31 PROCEDURE — 93306 TTE W/DOPPLER COMPLETE: CPT

## 2021-08-31 PROCEDURE — 74011250636 HC RX REV CODE- 250/636: Performed by: INTERNAL MEDICINE

## 2021-08-31 PROCEDURE — 97530 THERAPEUTIC ACTIVITIES: CPT

## 2021-08-31 PROCEDURE — 74011250637 HC RX REV CODE- 250/637: Performed by: INTERNAL MEDICINE

## 2021-08-31 PROCEDURE — 77010033678 HC OXYGEN DAILY

## 2021-08-31 PROCEDURE — 74011636637 HC RX REV CODE- 636/637: Performed by: FAMILY MEDICINE

## 2021-08-31 PROCEDURE — 94760 N-INVAS EAR/PLS OXIMETRY 1: CPT

## 2021-08-31 PROCEDURE — 71045 X-RAY EXAM CHEST 1 VIEW: CPT

## 2021-08-31 PROCEDURE — 65270000029 HC RM PRIVATE

## 2021-08-31 PROCEDURE — 82962 GLUCOSE BLOOD TEST: CPT

## 2021-08-31 PROCEDURE — 74011250637 HC RX REV CODE- 250/637: Performed by: FAMILY MEDICINE

## 2021-08-31 PROCEDURE — 93306 TTE W/DOPPLER COMPLETE: CPT | Performed by: INTERNAL MEDICINE

## 2021-08-31 PROCEDURE — 74011000258 HC RX REV CODE- 258: Performed by: FAMILY MEDICINE

## 2021-08-31 PROCEDURE — 92610 EVALUATE SWALLOWING FUNCTION: CPT | Performed by: SPEECH-LANGUAGE PATHOLOGIST

## 2021-08-31 PROCEDURE — 74011636637 HC RX REV CODE- 636/637: Performed by: INTERNAL MEDICINE

## 2021-08-31 PROCEDURE — 97116 GAIT TRAINING THERAPY: CPT

## 2021-08-31 RX ORDER — ONDANSETRON 2 MG/ML
4 INJECTION INTRAMUSCULAR; INTRAVENOUS
Status: DISCONTINUED | OUTPATIENT
Start: 2021-08-31 | End: 2021-09-11 | Stop reason: HOSPADM

## 2021-08-31 RX ADMIN — SODIUM CHLORIDE 50 ML/HR: 9 INJECTION, SOLUTION INTRAVENOUS at 00:56

## 2021-08-31 RX ADMIN — Medication 10 ML: at 13:10

## 2021-08-31 RX ADMIN — AMLODIPINE BESYLATE 5 MG: 5 TABLET ORAL at 08:06

## 2021-08-31 RX ADMIN — LEVOTHYROXINE SODIUM 50 MCG: 0.05 TABLET ORAL at 08:06

## 2021-08-31 RX ADMIN — QUETIAPINE FUMARATE 100 MG: 100 TABLET ORAL at 08:06

## 2021-08-31 RX ADMIN — ESCITALOPRAM OXALATE 20 MG: 10 TABLET ORAL at 08:07

## 2021-08-31 RX ADMIN — INSULIN HUMAN 16 UNITS: 100 INJECTION, SUSPENSION SUBCUTANEOUS at 16:11

## 2021-08-31 RX ADMIN — DOXYCYCLINE 100 MG: 100 INJECTION, POWDER, LYOPHILIZED, FOR SOLUTION INTRAVENOUS at 08:06

## 2021-08-31 RX ADMIN — INSULIN LISPRO 2 UNITS: 100 INJECTION, SOLUTION INTRAVENOUS; SUBCUTANEOUS at 16:11

## 2021-08-31 RX ADMIN — HEPARIN SODIUM 5000 UNITS: 5000 INJECTION INTRAVENOUS; SUBCUTANEOUS at 05:18

## 2021-08-31 RX ADMIN — CEFEPIME HYDROCHLORIDE 2 G: 2 INJECTION, POWDER, FOR SOLUTION INTRAVENOUS at 05:18

## 2021-08-31 RX ADMIN — Medication 10 ML: at 20:46

## 2021-08-31 RX ADMIN — METOPROLOL SUCCINATE 25 MG: 25 TABLET, EXTENDED RELEASE ORAL at 08:07

## 2021-08-31 RX ADMIN — MONTELUKAST 10 MG: 10 TABLET, FILM COATED ORAL at 08:07

## 2021-08-31 RX ADMIN — HEPARIN SODIUM 5000 UNITS: 5000 INJECTION INTRAVENOUS; SUBCUTANEOUS at 20:46

## 2021-08-31 RX ADMIN — ASPIRIN 81 MG: 81 TABLET, CHEWABLE ORAL at 08:06

## 2021-08-31 RX ADMIN — CEFEPIME HYDROCHLORIDE 2 G: 2 INJECTION, POWDER, FOR SOLUTION INTRAVENOUS at 16:11

## 2021-08-31 RX ADMIN — FAMOTIDINE 20 MG: 20 TABLET ORAL at 20:46

## 2021-08-31 RX ADMIN — ONDANSETRON 4 MG: 2 INJECTION INTRAMUSCULAR; INTRAVENOUS at 18:02

## 2021-08-31 RX ADMIN — DOXYCYCLINE 100 MG: 100 INJECTION, POWDER, LYOPHILIZED, FOR SOLUTION INTRAVENOUS at 20:46

## 2021-08-31 RX ADMIN — CLOPIDOGREL BISULFATE 75 MG: 75 TABLET, FILM COATED ORAL at 08:06

## 2021-08-31 RX ADMIN — HEPARIN SODIUM 5000 UNITS: 5000 INJECTION INTRAVENOUS; SUBCUTANEOUS at 13:10

## 2021-08-31 NOTE — PROGRESS NOTES
Problem: Dysphagia (Adult)  Goal: *Acute Goals and Plan of Care (Insert Text)  Description: Initiated 8/31/2021  1. Patient will tolerate regular diet, thin liquids free of s/s of aspiration within 7 days  Outcome: Not Met   SPEECH LANGUAGE PATHOLOGY BEDSIDE SWALLOW EVALUATION  Patient: Ambar Sewell [de-identified]79 y.o. female)  Date: 8/31/2021  Primary Diagnosis: Severe sepsis ([facility] [A41.9, R65.20]        Precautions: fall       ASSESSMENT :  Based on the objective data described below, the patient presents with suspected functional swallow but limited intake. She accepted single bites and sips of numerous tray items. Several, she held in her mouth and requested to expectorate as she did not like the taste. She did not chew and swallow any solids, preferring to spit them. No overt s/s of aspiration. Her baseline diet is unknown at this time. Patient will benefit from skilled intervention to address the above impairments. Patients rehabilitation potential is considered to be Fair     PLAN :  Recommendations and Planned Interventions:  Continue PO diet. Offer single bites and allow time to determine whether she will swallow or choose to spit out  Will consider downgrading diet if patient is consuming more purees, or shows overt difficulty with solids. Frequency/Duration: Patient will be followed by speech-language pathology 2 times a week to address goals. Discharge Recommendations: To Be Determined     SUBJECTIVE:   Patient stated I don't like that.     OBJECTIVE:     Past Medical History:   Diagnosis Date    Chronic obstructive pulmonary disease ([facility]     Diabetes ([facility]     Diverticulitis     DVT (deep venous thrombosis) ([facility]     History of heart artery stent     Hypertension     Hypothyroid     Renal failure      Past Surgical History:   Procedure Laterality Date    HX APPENDECTOMY      HX CYST REMOVAL      HX HYSTERECTOMY      HX IMPLANTABLE CARDIOVERTER DEFIBRILLATOR      HX PACEMAKER       Prior Level of Function/Home Situation:    Home Situation  Home Environment: Private residence  Living Alone: No  Support Systems: Family member(s)  Diet prior to admission: unknown  Current Diet:  regular   Cognitive and Communication Status:  Neurologic State: Alert  Orientation Level: Oriented to person  Cognition: Decreased command following  Perception: Appears intact  Perseveration: No perseveration noted  Safety/Judgement:  (decresed safety awareness)  Oral Assessment:  Oral Assessment  Labial: No impairment  Dentition:  (patient would not show her teeth, lowers visible during eating)  Oral Hygiene: moist  Lingual: No impairment  Mandible: No impairment  P.O. Trials:  Patient Position: up in bed  Vocal quality prior to P.O.: No impairment  Consistency Presented: Thin liquid; Solid;Pudding  How Presented: SLP-fed/presented;Straw;Spoon     Bolus Acceptance: No impairment  Bolus Formation/Control: Impaired  Type of Impairment:  (holding of solid)  Propulsion: No impairment           Aspiration Signs/Symptoms: None                            Pain:  Pain Scale 1: PAINAD (Advanced Dementia)  Pain Intensity 1: 0       After treatment:   Patient left in no apparent distress in bed, Call bell within reach, Nursing notified, and Bed / chair alarm activated    COMMUNICATION/EDUCATION:   Patient was educated regarding purpose of SLP visit. She participated. The patient's plan of care including recommendations, planned interventions, and recommended diet changes were discussed with: Registered nurse. Patient is unable to participate in goal setting and plan of care.     Thank you for this referral.  Katlin Rabago SLP  Time Calculation: 13 mins

## 2021-08-31 NOTE — PROGRESS NOTES
0700 Bedside and Verbal shift change report given to Manolo Morin (oncoming nurse) by [facility] (offgoing nurse). Report included the following information SBAR, Kardex, ED Summary, Intake/Output, MAR, Recent Results and Cardiac Rhythm Ventricular Paced. This patient was assisted with Intentional Toileting every 2 hours during this shift as appropriate. Documentation of ambulation and output reflected on Flowsheet as appropriate. Purposeful hourly rounding was completed using AIDET and 5Ps. Outcomes of PHR documented as they occurred. Bed alarm in use as appropriate. Dual Suction and ambubag in place. [phone] Family requesting Pt obtain an Echocardiogram while in the hospital for history of Heart disease. Orders received from Dr. Richard Le to order Echo.     1758 Pt complaining of nausea. Notified Dr. Lizbeth Becerra orders received to administer Zofran.     1802 PRN Zofran given for Pt complaints of Nausea. 1930 Bedside and Verbal shift change report given to Lizzie Alvarenga (oncoming nurse) by Gallo Silva RN (offgoing nurse). Report included the following information SBAR, Kardex, ED Summary, Intake/Output, MAR, Recent Results and Cardiac Rhythm Ventricular Paced.

## 2021-08-31 NOTE — PROGRESS NOTES
Daily Progress Note: 8/31/2021  Tulio Abraham MD    Assessment/Plan:   Severe sepsis / septic shock: 2/2 PNA. -CXR showed patchy bilateral airspace disease. Rapid COVID test negative.   -Check RVP/COVID-PCR. - Negative  -Start cefepime and Doxy  -IVF  -BC neg  -Procalcitonin elevated  -Send sputum culture, PNA workup (Legionella and Strep Pneumo urine ag, Mycoplasma IgM)     Chronic obstructive pulmonary disease: no wheezing.   -Combivent PRN     Hypertension: now hypotensive.   -Restarted Norvasc and metoprolol 8/28     Hypothyroid:   -check TSH. Continue LT4     CAD (coronary artery disease) / History of heart artery stent:   -cont DAPT, statin.      Diabetes: type 2, associated w/ CKD4.   -Poor control.   -On determir at home.   -Use NPH with SSI here     CKD (chronic kidney disease) stage 4, GFR 15-29 ml/min: stable. Follow BMP     Acute metabolic encephalopathy 2/2 sepsis.   -Has dementia and is at risk for delirium.   -Avoid benzos, opioids, anticholinergics. -Verbally re-direct whenever possible.   -Avoid chemical restraints unless pt is a danger to self or others.  -Haldol as needed  -Wrist restraints as needed when family not present  -Home Seroquel restarted      Thrombocytopenia: chronic, stable.    -Follow PLT     Code Status: DNR     Problem List:  Problem List as of 8/31/2021 Date Reviewed: 8/26/2021        Codes Class Noted - Resolved    Thrombocytopenia ([facility] ICD-10-CM: D69.6  ICD-9-CM: 287.5  8/27/2021 - Present        Chronic obstructive pulmonary disease (HCC) ICD-10-CM: J44.9  ICD-9-CM: 80  Unknown - Present        Hypertension ICD-10-CM: I10  ICD-9-CM: 401.9  Unknown - Present        Hypothyroid ICD-10-CM: E03.9  ICD-9-CM: 244.9  Unknown - Present        CAD (coronary artery disease) ICD-10-CM: I25.10  ICD-9-CM: 414.00  Unknown - Present        History of heart artery stent ICD-10-CM: Z95.5  ICD-9-CM: V45.82  Unknown - Present        Diabetes ([facility] ICD-10-CM: E11.9  ICD-9-CM: 250.00  Unknown - Present        CKD (chronic kidney disease) stage 4, GFR 15-29 ml/min ([facility]) ICD-10-CM: N18.4  ICD-9-CM: 476. 4  Unknown - Present        Severe sepsis ([facility]) ICD-10-CM: A41.9, R65.20  ICD-9-CM: 038.9, 995.92  Unknown - Present        Pneumonia ICD-10-CM: J18.9  ICD-9-CM: 361  Unknown - Present        Dementia ([facility] ICD-10-CM: F03.90  ICD-9-CM: 294.20  8/26/2021 - Present        Acute metabolic encephalopathy [facility]-LE: G93.41  ICD-9-CM: 348.31  8/26/2021 - Present              Subjective:    79 y.o. female w/ hx of CAD, HTN, DM, CKD4, dementia presenting w/ fever, weakness. Started yesterday, febrile at home, associated with AMS and lethargy per family. Pt also c/o chest pain and cough. ED workup showed 3/4 SIRS with bilateral PNA evident on CXR. Ms. Elizabeth Soctt is admitted for further evaluation and management. (Dr Jayda Gutierrez)    8/27: She is confused this am. Not able to provide any history and family not present. Off pressors. BP stable. 8/28: Confused. Has been more agitated during the night requiring restraints. Change antibiotic to Doxy so that we could use Haldol. BP is higher so will restart meds. Ask case management to see.     8/29: Daughter at bedside. She is less confused this am. A little more talkative. Family willing to look at rehab as she needs 24 hour care. Discussed with daughter that she should not be left alone. Her daughter lives out of town and works so her niece has been caring for her. 8/30:Sleeping but arouses. Nurses report she had a good night. WBC and Cr have improved. Afebrile. Case management following.     8/31: Pleasantly confused. Appetite has improved. Case management following for placement. Will continue IV antibiotics. Recheck CXR.      Review of Systems:   Review of systems not obtained due to patient factors. Objective:   Physical Exam:     Visit Vitals  /65 (BP 1 Location: Left upper arm, BP Patient Position:  At rest)   Pulse 63   Temp 98 °F (36.7 °C)   Resp 18   Ht 5' 2\" (1.575 m)   Wt 161 lb (73 kg)   SpO2 92%   BMI 29.45 kg/m²    O2 Flow Rate (L/min): 2 l/min O2 Device: Nasal cannula  Temp (24hrs), Av.1 °F (36.7 °C), Min:97.9 °F (36.6 °C), Max:98.4 °F (36.9 °C)    No intake/output data recorded.  1901 -  0700  In: 1069.2 [P.O.:480; I.V.:589.2]  Out: 2550 [Urine:2550]  General:  Confused,  no distress, frail appearing. Head:  Normocephalic, without obvious abnormality, atraumatic. Eyes:  Conjunctivae/corneas clear   Nose: Nares normal. Septum midline. Mucosa normal. No drainage or sinus tenderness. Throat: Lips, mucosa, and tongue moist..   Neck: Supple, symmetrical, trachea midline, no adenopathy, thyroid: no enlargement/tenderness/nodules, no carotid bruit and no JVD. Back:   Symmetric, no curvature. ROM normal. No CVA tenderness. Lungs:   Clear to auscultation bilaterally. Chest wall:  No tenderness or deformity. Heart:  Regular rate and rhythm, S1, S2 normal, no murmur, click, rub or gallop. Abdomen:   Soft, non-tender. Bowel sounds normal. No masses,  No organomegaly. Extremities: no cyanosis or edema. No calf tenderness or cords. Pulses: 2+ and symmetric all extremities. Skin: Skin color, texture, turgor normal. No rashes or lesions   Neurologic: CNII-XII intact. Alert and oriented X 1. Fine motor of hands and fingers normal.   equal.  No cogwheeling or rigidity. Gait not tested at this time. Sensation grossly normal to touch. Gross motor of extremities normal.       Data Review:    Port CXR 21   INDICATION: fever, cough  EXAM:  AP CHEST RADIOGRAPH  COMPARISON: 2018  FINDINGS:  AP portable view of the chest demonstrates right subclavian pacemaker. Heart  size is normal. Patchy airspace disease throughout the right lung and left lung  base. No pneumothorax. The osseous structures are unremarkable.   IMPRESSION  Patchy bilateral airspace disease. Portable CXR 8/28/21  IMPRESSION  Improved aeration the right midlung and lower lung zones with  residual patchy airspace opacity. Small left pleural effusion. Recent Days:  Recent Labs     08/30/21  0326 08/29/21  0039   WBC 9.7 11.8*   HGB 10.8* 9.7*   HCT 34.9* 30.7*    118*     Recent Labs     08/30/21  0326 08/29/21  0039    143   K 3.5 3.5   * 115*   CO2 22 22   * 122*   BUN 34* 38*   CREA 1.42* 1.34*   CA 9.7 9.1   ALB 3.0* 3.0*   TBILI 1.2* 1.1*   ALT 17 16     No results for input(s): PH, PCO2, PO2, HCO3, FIO2 in the last 72 hours.     24 Hour Results:  Recent Results (from the past 24 hour(s))   GLUCOSE, POC    Collection Time: 08/30/21 11:02 AM   Result Value Ref Range    Glucose (POC) 159 (H) 65 - 117 mg/dL    Performed by Amparo Otero, POC    Collection Time: 08/30/21  4:21 PM   Result Value Ref Range    Glucose (POC) 79 65 - 117 mg/dL    Performed by Amparo Otero, POC    Collection Time: 08/30/21  4:22 PM   Result Value Ref Range    Glucose (POC) 73 65 - 117 mg/dL    Performed by Amparo Otero, POC    Collection Time: 08/30/21  4:44 PM   Result Value Ref Range    Glucose (POC) 79 65 - 117 mg/dL    Performed by Amparo Otero, POC    Collection Time: 08/30/21  5:15 PM   Result Value Ref Range    Glucose (POC) 108 65 - 117 mg/dL    Performed by Amparo Otero, POC    Collection Time: 08/30/21  8:36 PM   Result Value Ref Range    Glucose (POC) 117 65 - 117 mg/dL    Performed by Alden Lobo (PCT)    GLUCOSE, POC    Collection Time: 08/31/21  7:18 AM   Result Value Ref Range    Glucose (POC) 105 65 - 117 mg/dL    Performed by Yosi Griggs        Medications reviewed  Current Facility-Administered Medications   Medication Dose Route Frequency    insulin NPH (NOVOLIN N, HUMULIN N) injection 16 Units  16 Units SubCUTAneous ACB&D    cefepime (MAXIPIME) 2 g in sterile water (preservative free) 10 mL IV syringe  2 g IntraVENous Q12H    aspirin chewable tablet 81 mg  81 mg Oral DAILY    clopidogreL (PLAVIX) tablet 75 mg  75 mg Oral DAILY    levothyroxine (SYNTHROID) tablet 50 mcg  50 mcg Oral ACB    montelukast (SINGULAIR) tablet 10 mg  10 mg Oral DAILY    QUEtiapine (SEROquel) tablet 100 mg  100 mg Oral BID    escitalopram oxalate (LEXAPRO) tablet 20 mg  20 mg Oral DAILY    amLODIPine (NORVASC) tablet 5 mg  5 mg Oral DAILY    metoprolol succinate (TOPROL-XL) XL tablet 25 mg  25 mg Oral DAILY    famotidine (PEPCID) tablet 20 mg  20 mg Oral QHS    insulin lispro (HUMALOG) injection   SubCUTAneous AC&HS    glucose chewable tablet 16 g  4 Tablet Oral PRN    dextrose (D50W) injection syrg 12.5-25 g  12.5-25 g IntraVENous PRN    glucagon (GLUCAGEN) injection 1 mg  1 mg IntraMUSCular PRN    ipratropium-albuterol (COMBIVENT RESPIMAT) 20 mcg-100 mcg inhalation spray  1 Puff Inhalation Q6H PRN    heparin (porcine) injection 5,000 Units  5,000 Units SubCUTAneous Q8H    haloperidol lactate (HALDOL) injection 1 mg  1 mg IntraMUSCular Q6H PRN    doxycycline (VIBRAMYCIN) 100 mg in 0.9% sodium chloride (MBP/ADV) 100 mL MBP  100 mg IntraVENous Q12H    sodium chloride (NS) flush 5-10 mL  5-10 mL IntraVENous PRN    sodium chloride (NS) flush 5-40 mL  5-40 mL IntraVENous Q8H    sodium chloride (NS) flush 5-40 mL  5-40 mL IntraVENous PRN    acetaminophen (TYLENOL) tablet 650 mg  650 mg Oral Q6H PRN    Or    acetaminophen (TYLENOL) suppository 650 mg  650 mg Rectal Q6H PRN    polyethylene glycol (MIRALAX) packet 17 g  17 g Oral DAILY PRN       Care Plan discussed with: Patient/Family and Nurse    Total time spent with patient: 30 minutes.     Esequiel Macias MD

## 2021-08-31 NOTE — PROGRESS NOTES
Problem: Mobility Impaired (Adult and Pediatric)  Goal: *Acute Goals and Plan of Care (Insert Text)  Description: FUNCTIONAL STATUS PRIOR TO ADMISSION: Patient was modified independent using a rolling walker for functional mobility. HOME SUPPORT PRIOR TO ADMISSION: The patient lived with daughter but did not require assist.    Physical Therapy Goals  Initiated 8/30/2021  1. Patient will move from supine to sit and sit to supine , scoot up and down, and roll side to side in bed with independence within 7 day(s). 2.  Patient will transfer from bed to chair and chair to bed with modified independence using the least restrictive device within 7 day(s). 3.  Patient will perform sit to stand with modified independence within 7 day(s). 4.  Patient will ambulate with modified independence for 200 feet with the least restrictive device within 7 day(s). Outcome: Progressing Towards Goal   PHYSICAL THERAPY TREATMENT  Patient: Zoraida Andrade (74 y.o. female)  Date: 8/31/2021  Diagnosis: Severe sepsis ([facility] [A41.9, R65.20] <principal problem not specified>       Precautions:  fall  Chart, physical therapy assessment, plan of care and goals were reviewed. ASSESSMENT  Patient continues with skilled PT services and is progressing towards goals. Communicated with nurse cleared for therapy. Family in the room with the patient and agreed with all goals set for the patient. Rolled on the edge of bed min assist, supine to sit min assist. Sit to stand min assist, ambulate with hand held assist towards the recliner min assist. No loss of balance steady sitting and standing. Activated chair alarm and notified nurse who agreed to continue to monitor patient. .     Current Level of Function Impacting Discharge (mobility/balance): min assist with transfers and ambulation hand held assist    Other factors to consider for discharge: fall         PLAN :  Patient continues to benefit from skilled intervention to address the above impairments. Continue treatment per established plan of care. to address goals. Recommendation for discharge: (in order for the patient to meet his/her long term goals) to be determine  Physical therapy at least 2 days/week in the home AND ensure assist and/or supervision for safety with rolling walker    This discharge recommendation:  Has been made in collaboration with the attending provider and/or case management    IF patient discharges home will need the following DME: patient owns DME required for discharge       SUBJECTIVE:   Patient stated pok.     OBJECTIVE DATA SUMMARY:   Critical Behavior:  Neurologic State: Alert, Confused  Orientation Level: Oriented to person, Disoriented to place, Disoriented to situation, Disoriented to time  Cognition: Follows commands, Memory loss, Impulsive, Poor safety awareness  Safety/Judgement:  (decresed safety awareness)  Functional Mobility Training:  Bed Mobility:  Rolling: Minimum assistance  Supine to Sit: Minimum assistance  Sit to Supine: Minimum assistance  Scooting: Minimum assistance        Transfers:  Sit to Stand: Minimum assistance  Stand to Sit: Minimum assistance  Stand Pivot Transfers: Minimum assistance     Bed to Chair: Minimum assistance                    Balance:  Sitting: Intact; High guard  Standing: Impaired; With support  Standing - Static: Fair  Standing - Dynamic : Fair  Ambulation/Gait Training:  Distance (ft): 10 Feet (ft)  Assistive Device: Gait belt  Ambulation - Level of Assistance: Minimal assistance     Gait Description (WDL): Exceptions to WDL           Base of Support: Widened     Speed/Rosa Maria: Fluctuations  Step Length: Right shortened;Left shortened                   Therapeutic Exercises:   Educate and instructed patient to continue active range of motion exercise on both legs while up on chair or on bed multiple times. Recommend patient to be up on the chair at least 3 times a day every meal times as tolerated. Pain Ratin/10    Activity Tolerance:   Good    After treatment patient left in no apparent distress:   Sitting in chair, Heels elevated for pressure relief, Call bell within reach, Bed / chair alarm activated, and Caregiver / family present    COMMUNICATION/COLLABORATION:   The patients plan of care was discussed with: Registered nurse. Flaco Martinez PT,WCC.    Time Calculation: 23 mins

## 2021-08-31 NOTE — PROGRESS NOTES
Transition of Care Plan:    RUR-24%    1. Long term planning-pt needs a [facility]AI, to be screened for Medicaid; will need LTC  2. [facility] following--pt will need auth  3. PT/OT/SLP following--recommending snf  4. Hospice/ Palliative consult; info session with [facility]  5. CM to follow for discharge needs  6. Great-niece is Jamilah Garcia at [phone]  THANG Schreiber

## 2021-08-31 NOTE — PROGRESS NOTES
1900- Bedside and Verbal shift change report given to MARINA Montero (oncoming nurse) by Marie Aguilar (offgoing nurse). Report included the following information SBAR, Intake/Output, Recent Results and Cardiac Rhythm v-paced. This patient was assisted with Intentional Toileting every 2 hours during this shift as appropriate. Documentation of ambulation and output reflected on Flowsheet as appropriate. Purposeful hourly rounding was completed using AIDET and 5Ps. Outcomes of PHR documented as they occurred. Bed alarm in use as appropriate. Dual Suction and ambubag in place. 0715- Bedside and Verbal shift change report given to  (oncoming nurse) by MARINA Montero (offgoing nurse). Report included the following information SBAR, Intake/Output, Recent Results and Cardiac Rhythm v-paced.

## 2021-08-31 NOTE — PROGRESS NOTES
NUTRITION     Best practice alert was triggered based on results obtained during nursing admission assessment for Unsure wt loss. Wt Readings from Last 10 Encounters:   08/31/21 73 kg (161 lb)   12/03/18 77.6 kg (171 lb)   02/09/18 77.6 kg (171 lb)   12/20/15 75 kg (165 lb 6 oz)   09/16/15 74.8 kg (165 lb)       The patient's chart was reviewed and nutrition assessment is not indicated at this time. Patient pleasantly confused, but denies weight loss. RD attempted to call listed emergency contacts - busy signal, unable to leave VM. Plan to see patient for rescreen as indicated. Thank you.      Nyla Zhang RD

## 2021-09-01 LAB
ALBUMIN SERPL-MCNC: 2.9 G/DL (ref 3.5–5)
ALBUMIN/GLOB SERPL: 0.8 {RATIO} (ref 1.1–2.2)
ALP SERPL-CCNC: 56 U/L (ref 45–117)
ALT SERPL-CCNC: 18 U/L (ref 12–78)
ANION GAP SERPL CALC-SCNC: 7 MMOL/L (ref 5–15)
AST SERPL-CCNC: 13 U/L (ref 15–37)
BACTERIA SPEC CULT: NORMAL
BASOPHILS # BLD: 0.1 K/UL (ref 0–0.1)
BASOPHILS NFR BLD: 0 % (ref 0–1)
BILIRUB SERPL-MCNC: 0.6 MG/DL (ref 0.2–1)
BUN SERPL-MCNC: 33 MG/DL (ref 6–20)
BUN/CREAT SERPL: 26 (ref 12–20)
CALCIUM SERPL-MCNC: 9.3 MG/DL (ref 8.5–10.1)
CHLORIDE SERPL-SCNC: 116 MMOL/L (ref 97–108)
CO2 SERPL-SCNC: 22 MMOL/L (ref 21–32)
CREAT SERPL-MCNC: 1.27 MG/DL (ref 0.55–1.02)
DIFFERENTIAL METHOD BLD: ABNORMAL
EOSINOPHIL # BLD: 0.2 K/UL (ref 0–0.4)
EOSINOPHIL NFR BLD: 2 % (ref 0–7)
ERYTHROCYTE [DISTWIDTH] IN BLOOD BY AUTOMATED COUNT: 14 % (ref 11.5–14.5)
GLOBULIN SER CALC-MCNC: 3.5 G/DL (ref 2–4)
GLUCOSE BLD STRIP.AUTO-MCNC: 105 MG/DL (ref 65–117)
GLUCOSE BLD STRIP.AUTO-MCNC: 80 MG/DL (ref 65–117)
GLUCOSE BLD STRIP.AUTO-MCNC: 91 MG/DL (ref 65–117)
GLUCOSE BLD STRIP.AUTO-MCNC: 97 MG/DL (ref 65–117)
GLUCOSE SERPL-MCNC: 64 MG/DL (ref 65–100)
HCT VFR BLD AUTO: 35.3 % (ref 35–47)
HGB BLD-MCNC: 11.5 G/DL (ref 11.5–16)
IMM GRANULOCYTES # BLD AUTO: 0.1 K/UL (ref 0–0.04)
IMM GRANULOCYTES NFR BLD AUTO: 1 % (ref 0–0.5)
LYMPHOCYTES # BLD: 1.4 K/UL (ref 0.8–3.5)
LYMPHOCYTES NFR BLD: 12 % (ref 12–49)
MCH RBC QN AUTO: 29.4 PG (ref 26–34)
MCHC RBC AUTO-ENTMCNC: 32.6 G/DL (ref 30–36.5)
MCV RBC AUTO: 90.3 FL (ref 80–99)
MONOCYTES # BLD: 0.9 K/UL (ref 0–1)
MONOCYTES NFR BLD: 8 % (ref 5–13)
NEUTS SEG # BLD: 9.2 K/UL (ref 1.8–8)
NEUTS SEG NFR BLD: 77 % (ref 32–75)
NRBC # BLD: 0 K/UL (ref 0–0.01)
NRBC BLD-RTO: 0 PER 100 WBC
PLATELET # BLD AUTO: 178 K/UL (ref 150–400)
PMV BLD AUTO: 12.9 FL (ref 8.9–12.9)
POTASSIUM SERPL-SCNC: 3.4 MMOL/L (ref 3.5–5.1)
PROT SERPL-MCNC: 6.4 G/DL (ref 6.4–8.2)
RBC # BLD AUTO: 3.91 M/UL (ref 3.8–5.2)
SERVICE CMNT-IMP: NORMAL
SODIUM SERPL-SCNC: 145 MMOL/L (ref 136–145)
WBC # BLD AUTO: 11.9 K/UL (ref 3.6–11)

## 2021-09-01 PROCEDURE — 82962 GLUCOSE BLOOD TEST: CPT

## 2021-09-01 PROCEDURE — 94760 N-INVAS EAR/PLS OXIMETRY 1: CPT

## 2021-09-01 PROCEDURE — 77010033678 HC OXYGEN DAILY

## 2021-09-01 PROCEDURE — 74011000258 HC RX REV CODE- 258: Performed by: FAMILY MEDICINE

## 2021-09-01 PROCEDURE — 2709999900 HC NON-CHARGEABLE SUPPLY

## 2021-09-01 PROCEDURE — 97530 THERAPEUTIC ACTIVITIES: CPT

## 2021-09-01 PROCEDURE — 74011250637 HC RX REV CODE- 250/637: Performed by: FAMILY MEDICINE

## 2021-09-01 PROCEDURE — 65270000029 HC RM PRIVATE

## 2021-09-01 PROCEDURE — 74011250636 HC RX REV CODE- 250/636: Performed by: FAMILY MEDICINE

## 2021-09-01 PROCEDURE — 74011000250 HC RX REV CODE- 250: Performed by: FAMILY MEDICINE

## 2021-09-01 PROCEDURE — 74011250636 HC RX REV CODE- 250/636: Performed by: INTERNAL MEDICINE

## 2021-09-01 PROCEDURE — 74011250637 HC RX REV CODE- 250/637: Performed by: INTERNAL MEDICINE

## 2021-09-01 PROCEDURE — 36415 COLL VENOUS BLD VENIPUNCTURE: CPT

## 2021-09-01 PROCEDURE — 97535 SELF CARE MNGMENT TRAINING: CPT

## 2021-09-01 PROCEDURE — 85025 COMPLETE CBC W/AUTO DIFF WBC: CPT

## 2021-09-01 PROCEDURE — 80053 COMPREHEN METABOLIC PANEL: CPT

## 2021-09-01 RX ORDER — POTASSIUM CHLORIDE 750 MG/1
20 TABLET, FILM COATED, EXTENDED RELEASE ORAL 2 TIMES DAILY
Status: DISPENSED | OUTPATIENT
Start: 2021-09-01 | End: 2021-09-03

## 2021-09-01 RX ORDER — HYDRALAZINE HYDROCHLORIDE 20 MG/ML
10 INJECTION INTRAMUSCULAR; INTRAVENOUS
Status: DISCONTINUED | OUTPATIENT
Start: 2021-09-01 | End: 2021-09-11 | Stop reason: HOSPADM

## 2021-09-01 RX ADMIN — QUETIAPINE FUMARATE 100 MG: 100 TABLET ORAL at 08:55

## 2021-09-01 RX ADMIN — POTASSIUM CHLORIDE 20 MEQ: 750 TABLET, FILM COATED, EXTENDED RELEASE ORAL at 17:43

## 2021-09-01 RX ADMIN — DOXYCYCLINE 100 MG: 100 INJECTION, POWDER, LYOPHILIZED, FOR SOLUTION INTRAVENOUS at 09:04

## 2021-09-01 RX ADMIN — AMLODIPINE BESYLATE 5 MG: 5 TABLET ORAL at 08:55

## 2021-09-01 RX ADMIN — CEFEPIME HYDROCHLORIDE 2 G: 2 INJECTION, POWDER, FOR SOLUTION INTRAVENOUS at 04:57

## 2021-09-01 RX ADMIN — CLOPIDOGREL BISULFATE 75 MG: 75 TABLET, FILM COATED ORAL at 08:55

## 2021-09-01 RX ADMIN — QUETIAPINE FUMARATE 100 MG: 100 TABLET ORAL at 17:43

## 2021-09-01 RX ADMIN — HEPARIN SODIUM 5000 UNITS: 5000 INJECTION INTRAVENOUS; SUBCUTANEOUS at 05:13

## 2021-09-01 RX ADMIN — ASPIRIN 81 MG: 81 TABLET, CHEWABLE ORAL at 08:55

## 2021-09-01 RX ADMIN — POTASSIUM CHLORIDE 20 MEQ: 750 TABLET, FILM COATED, EXTENDED RELEASE ORAL at 08:55

## 2021-09-01 RX ADMIN — Medication 10 ML: at 05:04

## 2021-09-01 RX ADMIN — ESCITALOPRAM OXALATE 20 MG: 10 TABLET ORAL at 08:55

## 2021-09-01 RX ADMIN — Medication 10 ML: at 22:35

## 2021-09-01 RX ADMIN — MONTELUKAST 10 MG: 10 TABLET, FILM COATED ORAL at 08:55

## 2021-09-01 RX ADMIN — HEPARIN SODIUM 5000 UNITS: 5000 INJECTION INTRAVENOUS; SUBCUTANEOUS at 22:32

## 2021-09-01 RX ADMIN — HYDRALAZINE HYDROCHLORIDE 10 MG: 20 INJECTION INTRAMUSCULAR; INTRAVENOUS at 17:45

## 2021-09-01 RX ADMIN — HEPARIN SODIUM 5000 UNITS: 5000 INJECTION INTRAVENOUS; SUBCUTANEOUS at 13:08

## 2021-09-01 RX ADMIN — CEFEPIME HYDROCHLORIDE 2 G: 2 INJECTION, POWDER, FOR SOLUTION INTRAVENOUS at 17:46

## 2021-09-01 RX ADMIN — Medication 10 ML: at 13:08

## 2021-09-01 RX ADMIN — LEVOTHYROXINE SODIUM 50 MCG: 0.05 TABLET ORAL at 08:19

## 2021-09-01 RX ADMIN — METOPROLOL SUCCINATE 25 MG: 25 TABLET, EXTENDED RELEASE ORAL at 08:55

## 2021-09-01 RX ADMIN — DOXYCYCLINE 100 MG: 100 INJECTION, POWDER, LYOPHILIZED, FOR SOLUTION INTRAVENOUS at 22:33

## 2021-09-01 NOTE — PROGRESS NOTES
9/1/2021  2:50 PM  CM spoke w/ pt's great-niece Binu Bearden [phone] re: SNF Rehab/LTC, :[facility], advised [address] [facility] do have female  LTC beds, she prefers [address] , verbal consent for referral , sent in [facility]. Await response.   CM will follow and assist  DANIEL Paz

## 2021-09-01 NOTE — PROGRESS NOTES
Bedside shift change report given to [employer] (oncoming nurse) by Sarahy Costello (offgoing nurse). Report included the following information SBAR and Kardex.

## 2021-09-01 NOTE — PROGRESS NOTES
Problem: Mobility Impaired (Adult and Pediatric)  Goal: *Acute Goals and Plan of Care (Insert Text)  Description: FUNCTIONAL STATUS PRIOR TO ADMISSION: Patient was modified independent using a rolling walker for functional mobility. HOME SUPPORT PRIOR TO ADMISSION: The patient lived with daughter but did not require assist.    Physical Therapy Goals  Initiated 8/30/2021  1. Patient will move from supine to sit and sit to supine , scoot up and down, and roll side to side in bed with independence within 7 day(s). 2.  Patient will transfer from bed to chair and chair to bed with modified independence using the least restrictive device within 7 day(s). 3.  Patient will perform sit to stand with modified independence within 7 day(s). 4.  Patient will ambulate with modified independence for 200 feet with the least restrictive device within 7 day(s). Outcome: Progressing Towards Goal  Note:   PHYSICAL THERAPY TREATMENT  Patient: Ambar Sewell (52 y.o. female)  Date: 9/1/2021  Diagnosis: [facility] [A41.9, R65.20] <principal problem not specified>       Precautions:    Chart, physical therapy assessment, plan of care and goals were reviewed. ASSESSMENT  Patient continues with skilled PT services and progressing towards goals. Pt received sitting EOB oriented to self only. Pt reports \" feeling sick\" and declining attempts for OOB activity, OOB to restroom, chair or walking within room. O2 95% on room air during session. Pt returned herself to supine. Assisted with changing brief and gown. Current Level of Function Impacting Discharge (mobility/balance): Other factors to consider for discharge: cognitive deficits         PLAN :  Patient continues to benefit from skilled intervention to address the above impairments. Continue treatment per established plan of care. to address goals.     Recommendation for discharge: (in order for the patient to meet his/her long term goals)  Therapy up to 5 days/week in SNF setting    This discharge recommendation:  Has been made in collaboration with the attending provider and/or case management    IF patient discharges home will need the following DME: to be determined (TBD)       SUBJECTIVE:   Patient stated [place] feel sick\" [place]' you just leave.     OBJECTIVE DATA SUMMARY:   Critical Behavior:  Neurologic State: Alert, Confused  Orientation Level: Oriented to person  Cognition: Appropriate decision making  Safety/Judgement:  (decresed safety awareness)  Functional Mobility Training:  Bed Mobility:     Supine to Sit: Contact guard assistance              Transfers:                                   Balance:  Sitting: High guard  Ambulation/Gait Training:                                                        Stairs: Therapeutic Exercises:     Pain Rating:      Activity Tolerance:   Fair    After treatment patient left in no apparent distress:   Supine in bed, Call bell within reach, and Bed / chair alarm activated    COMMUNICATION/COLLABORATION:   The patients plan of care was discussed with: Occupational therapist and Registered nurseHarrison Amaya   Time Calculation: 18 mins

## 2021-09-01 NOTE — PROGRESS NOTES
9/1/2021  Case Management Progress Note    2:17 PM  Patient is 79year old female admitted 8/29 with severe sepsis. Patient's RUR is 23% yellow/moderate risk for readmission  Chart reviewed--patient discussed in IDR rounds this morning. Per previous notes, the [facility] is following patient and family is thinking the long term care/hospice route. However I called the [facility] and at this time they are not able to take the patient for long term care and are suggesting we try another location ([facility] etc). Noelle Cai @ [facility] did say to try back if she's here through the coming weekend as their census may change. Any other choice will need to be discussed with patient's family. They also had an info session with [facility] per notes. Also noted need for [facility], Medicaid application. I have sent a referral to [facility] to have them complete a Medicaid application/screening and will complete a [facility]AI afterwards. Transition of Care Plan  1. Continue medical management/treatment  2. Looking at long term care/hospice  3. [facility] has done an info session  4. The [facility] cannot accept for LTC at this time; will get more choices  5. Sent referral to Critical access hospital  6. Will complete [facility] once screening done  7.  Cm will follow    SURJIT Long

## 2021-09-01 NOTE — PROGRESS NOTES
Problem: Self Care Deficits Care Plan (Adult)  Goal: *Acute Goals and Plan of Care (Insert Text)  Description:   FUNCTIONAL STATUS PRIOR TO ADMISSION: Patient not able to give history and no family present. Per chart patient lives with niece, but was ambulatory without assistance. HOME SUPPORT: The patient lived with family. Occupational Therapy Goals  Initiated 8/30/2021  1. Patient will perform self-feeding with supervision/set-up within 7 day(s). 2.  Patient will perform grooming with supervision/set-up within 7 day(s). 3.  Patient will perform toilet transfers with supervision/set-up within 7 day(s). 4.  Patient will perform all aspects of toileting with supervision/set-up within 7 day(s). Outcome: Progressing Towards Goal   OCCUPATIONAL THERAPY TREATMENT  Patient: Telma Blank (49 y.o. female)  Date: 9/1/2021  Diagnosis: Severe sepsis ([facility] [address] [A41.9, R65.20] <principal problem not specified>       Precautions:    Chart, occupational therapy assessment, plan of care, and goals were reviewed. ASSESSMENT  Patient continues with skilled OT services and is slowly progressing towards goals. Patient sat EOB for >10 min unsupported with good balance noted. Patient initially pleasant though confused. However she became less receptive to activity and attempts at mobility, and complained of not feeling well, indicating upset stomach. Patient offered ginger ale though would not accept. She did complete bed level mobility with complaint though only minimal verbal and tactile cueing for hygiene tasks and changing soiled hospital gown. Current Level of Function Impacting Discharge (ADLs): total assist for bathing, dressing, toileting     Other factors to consider for discharge: unclear PLOF for ADL completion. Chart notes she was ambulatory. Confusion is significant         PLAN :  Patient continues to benefit from skilled intervention to address the above impairments.   Continue treatment per established plan of care to address goals. Recommend with staff: Shira Bones for meals     Recommend next OT session: progress POC as able    Recommendation for discharge: (in order for the patient to meet his/her long term goals)  Therapy up to 5 days/week in SNF setting vs LTC depending on patient and family's goals     This discharge recommendation:  Has been made in collaboration with the attending provider and/or case management    IF patient discharges home will need the following DME: TBD       SUBJECTIVE:   Patient confused    OBJECTIVE DATA SUMMARY:   Cognitive/Behavioral Status:  Neurologic State: Alert;Confused  Orientation Level: Oriented X4  Cognition: Appropriate decision making             Functional Mobility and Transfers for ADLs:  Bed Mobility:  Supine to Sit: Contact guard assistance    Transfers:             Balance:  Sitting: High guard    ADL Intervention:       Lower Body Dressing Assistance  Socks: Total assistance (dependent)            Pain:  None indicated     Activity Tolerance:   Fair and requires rest breaks    After treatment patient left in no apparent distress:   Supine in bed, Call bell within reach, Bed / chair alarm activated, and Side rails x 3    COMMUNICATION/COLLABORATION:   The patients plan of care was discussed with: Physical therapy assistant and Registered nurse.      Jinny Duncan OT  Time Calculation: 22 mins

## 2021-09-01 NOTE — PROGRESS NOTES
Spiritual Care Assessment/Progress Note  [address]      NAME: Kati Finney      MRN: [id number]  AGE: 79 y.o.  SEX: female  Faith Affiliation: No Quaker   Language: English     9/1/2021     Total Time (in minutes): 30     Spiritual Assessment begun in [facility] 5M1 MED SURG 1 through conversation with:         [x]Patient        [] Family    [] Friend(s)        Reason for Consult: Initial/Spiritual assessment, patient floor     Spiritual beliefs: (Please include comment if needed)     [] Identifies with a salena tradition:         [] Supported by a salena community:            [] Claims no spiritual orientation:           [] Seeking spiritual identity:                [] Adheres to an individual form of spirituality:           [x] Not able to assess:                           Identified resources for coping:      [x] Prayer                               [] Music                  [] Guided Imagery     [x] Family/friends                 [] Pet visits     [] Devotional reading                         [] Unknown     [] Other:                                              Interventions offered during this visit: (See comments for more details)    Patient Interventions: Affirmation of emotions/emotional suffering, Catharsis/review of pertinent events in supportive environment, Life review/legacy, Initial/Spiritual assessment, patient floor, Normalization of emotional/spiritual concerns, Prayer (assurance of)           Plan of Care:     [] Support spiritual and/or cultural needs    [] Support AMD and/or advance care planning process      [] Support grieving process   [] Coordinate Rites and/or Rituals    [] Coordination with community clergy   [] No spiritual needs identified at this time   [] Detailed Plan of Care below (See Comments)  [] Make referral to Music Therapy  [] Make referral to Pet Therapy     [] Make referral to Addiction services  [] Make referral to [facility]  [] Make referral to Spiritual Care Partner  [] No future visits requested        [x] Follow up upon further referrals     Comments:   Initial spiritual care assessment with patient, Ms. Kimber Judd-Jennifer in room 516, 5M1 Med Surg 1. Ms. Rodolfo Baca was lying in bed with lights off, door open and wide awake. She greeted  with a welcoming smile and tried with everything she had to communicate as clearly as she could. Ms. Rodolfo Baca has trouble formulating how to communicate her thoughts, thus, making her seemed confused. She was not able to tell  if she had any children or not. When mentioned one of the names of her great- niece, she knew who I was speaking of. Because of the cognitive issues, she was not able to communicate her salena, though it seemed clear she believed in God, nor was she able to communicate any relative information. Any information needed would have to come from family members. Ms. Rodolfo Baca seemed very happy for the company and kept complementing the  over and over.  provided time for her to exercise her memory and to share as much as she was able, and a caring supporting presence that Ms. Rodolfo Baca expressed that she appreciated very much. Advised nurse to contact [facility] for any further referrals.     Chaplain Julio Hsu M.Div.  Jillian Rodriguez ([id number])

## 2021-09-01 NOTE — PROGRESS NOTES
Daily Progress Note: 9/1/2021  Felicie Kayser Motsinger, Lakisha Andrade MD    Assessment/Plan:   Severe sepsis / septic shock: 2/2 PNA. -CXR showed patchy bilateral airspace disease.   -Check RVP/COVID-PCR. - Negative  -Start cefepime and Doxy  -BC neg  -Procalcitonin elevated  -Send sputum culture, PNA workup (Legionella and Strep Pneumo urine ag, Mycoplasma IgM)     Chronic obstructive pulmonary disease: no wheezing.   -Combivent PRN     Hypertension: now hypotensive.   -Restarted Norvasc and metoprolol 8/28     Hypothyroid:   -check TSH. Continue LT4     CAD (coronary artery disease) / History of heart artery stent:   -cont DAPT, statin.      Diabetes: type 2, associated w/ CKD4.   -Poor control.   -On determir at home.   -Use NPH with SSI here     CKD (chronic kidney disease) stage 4, GFR 15-29 ml/min: stable. -Follow BMP     Acute metabolic encephalopathy 2/2 sepsis.   -Has dementia and is at risk for delirium.   -Avoid benzos, opioids, anticholinergics. -Verbally re-direct whenever possible.   -Avoid chemical restraints unless pt is a danger to self or others.  -Haldol as needed  -Wrist restraints as needed when family not present  -Home Seroquel restarted      Thrombocytopenia: chronic, stable.    -Follow PLT     Code Status: DNR     Problem List:  Problem List as of 9/1/2021 Date Reviewed: 8/26/2021        Codes Class Noted - Resolved    [facility] ICD-10-CM: D69.6  ICD-9-CM: 287.5  8/27/2021 - Present        Chronic obstructive pulmonary disease (HCC) ICD-10-CM: J44.9  ICD-9-CM: 80  Unknown - Present        Hypertension ICD-10-CM: I10  ICD-9-CM: 401.9  Unknown - Present        Hypothyroid ICD-10-CM: E03.9  ICD-9-CM: 244.9  Unknown - Present        CAD (coronary artery disease) ICD-10-CM: I25.10  ICD-9-CM: 414.00  Unknown - Present        History of heart artery stent ICD-10-CM: Z95.5  ICD-9-CM: V45.82  Unknown - Present        Diabetes ([facility] [address] ICD-10-CM: E11.9  ICD-9-CM: 250.00 Pt sleepy but opens eyes when name called. Unknown - Present        CKD (chronic kidney disease) stage 4, GFR 15-29 ml/min (HCC) ICD-10-CM: N18.4  ICD-9-CM: 030. 4  Unknown - Present        Severe sepsis (HCC) ICD-10-CM: A41.9, R65.20  ICD-9-CM: 038.9, 995.92  Unknown - Present        Pneumonia ICD-10-CM: J18.9  ICD-9-CM: 161  Unknown - Present        Dementia ([facility] [address] ICD-10-CM: F03.90  ICD-9-CM: 294.20  8/26/2021 - Present        Acute metabolic encephalopathy [facility]-11-YM: G93.41  ICD-9-CM: 348.31  8/26/2021 - Present              Subjective:    79 y.o. female w/ hx of CAD, HTN, DM, CKD4, dementia presenting w/ fever, weakness. Started yesterday, febrile at home, associated with AMS and lethargy per family. Pt also c/o chest pain and cough. ED workup showed 3/4 SIRS with bilateral PNA evident on CXR. Ms. Dwayne Castro is admitted for further evaluation and management. (Dr Hercules Section)    8/27: She is confused this am. Not able to provide any history and family not present. Off pressors. BP stable. 8/28: Confused. Has been more agitated during the night requiring restraints. Change antibiotic to Doxy so that we could use Haldol. BP is higher so will restart meds. Ask case management to see.     8/29: Daughter at bedside. She is less confused this am. A little more talkative. Family willing to look at rehab as she needs 24 hour care. Discussed with daughter that she should not be left alone. Her daughter lives out of town and works so her niece has been caring for her. 8/30:Sleeping but arouses. Nurses report she had a good night. WBC and Cr have improved. Afebrile. Case management following.     8/31: Pleasantly confused. Appetite has improved. Case management following for placement. Will continue IV antibiotics. Recheck CXR.     9/1: Family looking for SNF/Long term placement. Case management following. WBC has improved. CXR improving.  Will continue IV antibiotics for now.      Review of Systems:   Review of systems not obtained due to patient factors. Objective:   Physical Exam:     Visit Vitals  BP (!) 149/73 (BP 1 Location: Right arm, BP Patient Position: At rest)   Pulse (!) 58   Temp 98.4 °F (36.9 °C)   Resp 16   Ht 5' 2\" (1.575 m)   Wt 161 lb (73 kg)   SpO2 94%   BMI 29.45 kg/m²    O2 Flow Rate (L/min): 2 l/min O2 Device: Nasal cannula  Temp (24hrs), Av.2 °F (36.8 °C), Min:97.5 °F (36.4 °C), Max:98.8 °F (37.1 °C)    No intake/output data recorded.  0701 -  1900  In: 1789.2 [P.O.:1200; I.V.:589.2]  Out: 2400 [Urine:2400]  General:  Confused,  no distress, frail appearing. Head:  Normocephalic, without obvious abnormality, atraumatic. Eyes:  Conjunctivae/corneas clear   Nose: Nares normal. Septum midline. Mucosa normal. No drainage or sinus tenderness. Throat: Lips, mucosa, and tongue moist..   Neck: Supple, symmetrical, trachea midline, no adenopathy, thyroid: no enlargement/tenderness/nodules, no carotid bruit and no JVD. Back:   Symmetric, no curvature. ROM normal. No CVA tenderness. Lungs:   Clear to auscultation bilaterally. Chest wall:  No tenderness or deformity. Heart:  Regular rate and rhythm, S1, S2 normal, no murmur, click, rub or gallop. Abdomen:   Soft, non-tender. Bowel sounds normal. No masses,  No organomegaly. Extremities: no cyanosis or edema. No calf tenderness or cords. Pulses: 2+ and symmetric all extremities. Skin: Skin color, texture, turgor normal. No rashes or lesions   Neurologic: CNII-XII intact. Alert and oriented X 1. Fine motor of hands and fingers normal.   equal.  No cogwheeling or rigidity. Gait not tested at this time. Sensation grossly normal to touch. Gross motor of extremities normal.       Data Review:    Port CXR 21   INDICATION: fever, cough  EXAM:  AP CHEST RADIOGRAPH  COMPARISON: 2018  FINDINGS:  AP portable view of the chest demonstrates right subclavian pacemaker.  Heart  size is normal. Patchy airspace disease throughout the right lung and left lung  base. No pneumothorax. The osseous structures are unremarkable. IMPRESSION  Patchy bilateral airspace disease. Portable CXR 8/28/21  IMPRESSION  Improved aeration the right midlung and lower lung zones with  residual patchy airspace opacity. Small left pleural effusion. CXR 8/31/21  IMPRESSION  Improved aeration the right lung with minimal residual patchy  airspace opacity in the right midlung zone. .    Interpretation Summary- ECHO 8/31/21    · LV: Estimated LVEF is 55 - 60%. Normal cavity size and systolic function (ejection fraction normal). Upper normal wall thickness. Wall motion: normal. Mild (grade 1) left ventricular diastolic dysfunction. · Image quality for this study was technically difficult. · MV: Moderate mitral annular calcification. Mild mitral valve regurgitation is present. · LA: Mildly dilated left atrium. · AV: Mild aortic valve stenosis is present. · RV: Pacing wire present       Recent Days:  Recent Labs     08/30/21  0326   WBC 9.7   HGB 10.8*   HCT 34.9*        Recent Labs     08/30/21  0326      K 3.5   *   CO2 22   *   BUN 34*   CREA 1.42*   CA 9.7   ALB 3.0*   TBILI 1.2*   ALT 17     No results for input(s): PH, PCO2, PO2, HCO3, FIO2 in the last 72 hours.     24 Hour Results:  Recent Results (from the past 24 hour(s))   GLUCOSE, POC    Collection Time: 08/31/21  7:18 AM   Result Value Ref Range    Glucose (POC) 105 65 - 117 mg/dL    Performed by Mandy Santana POC    Collection Time: 08/31/21 11:11 AM   Result Value Ref Range    Glucose (POC) 130 (H) 65 - 117 mg/dL    Performed by Volney Rocks    ECHO ADULT COMPLETE    Collection Time: 08/31/21  3:45 PM   Result Value Ref Range    IVSd 1.13 (A) 0.60 - 0.90 cm    LVIDd 4.66 3.90 - 5.30 cm    LVIDs 2.93 cm    LVOT d 1.80 cm    LVPWd 0.82 0.60 - 0.90 cm    LVOT Peak Gradient 2.34 mmHg    Left Ventricular Outflow Tract Mean Gradient 1.22 mmHg    LVOT SV 40.5 mL    LVOT Peak Velocity 75.42 cm/s    LVOT VTI 15.95 cm    RVIDd 3.68 cm    RVSP 35.78 mmHg    Left Atrium Major Axis 3.48 cm    LA Volume 52.66 22.0 - 52.0 mL    LA Area 4C 19.39 cm2    LA Vol 2C 39.07 22.00 - 52.00 mL    LA Vol 4C 57.15 (A) 22.00 - 52.00 mL    LA Volume DISK BP 48.83 22.0 - 52.0 mL    Est. RA Pressure 3.00 mmHg    Aortic Valve Area by Continuity of Peak Velocity 0.68 cm2    Aortic Valve Area by Continuity of VTI 0.61 cm2    AoV PG 32.12 mmHg    Aortic Valve Systolic Mean Gradient 01.75 mmHg    Aortic Valve Systolic Peak Velocity 320.90 cm/s    AoV VTI 66.90 cm    MV A Eben 168.53 centimeter/second    Mitral Valve E Wave Deceleration Time 291.12 ms    MV E Eben 93.50 centimeter/second    LV E' Lateral Velocity 5.71 centimeter/second    LV E' Septal Velocity 4.19 centimeter/second    Mitral Valve Pressure Half-time 84.42 ms    MVA (PHT) 2.61 cm2    MVA VTI 0.77 cm2    MV Peak Gradient 17.75 mmHg    MV Mean Gradient 5.62 mmHg    Mitral Valve Max Velocity 210.63 cm/s    Mitral Valve Annulus Velocity Time Integral 52.79 cm    Pulmonic Valve Systolic Peak Instantaneous Gradient 3.38 mmHg    Tapse 2.08 (A) 1.50 - 2.00 cm    Triscuspid Valve Regurgitation Peak Gradient 32.78 mmHg    TR Max Velocity 286.25 cm/s    Ao Root D 3.16 cm    LV Mass .7 67.0 - 162.0 g    LV Mass AL Index 90.1 43.0 - 95.0 g/m2    Left Atrium Minor Axis 2.00 cm    LA Vol Index 30.26 16.00 - 28.00 ml/m2    LA Vol Index 22.45 16.00 - 28.00 ml/m2    LA Vol Index 32.84 16.00 - 28.00 ml/m2    GODFREY/BSA Pk Eben 0.4 cm2/m2    GODFREY/BSA VTI 0.4 cm2/m2   GLUCOSE, POC    Collection Time: 08/31/21  3:53 PM   Result Value Ref Range    Glucose (POC) 140 (H) 65 - 117 mg/dL    Performed by Ashleigh Hatch    GLUCOSE, POC    Collection Time: 08/31/21  8:45 PM   Result Value Ref Range    Glucose (POC) 95 65 - 117 mg/dL    Performed by Demond Lozada        Medications reviewed  Current Facility-Administered Medications   Medication Dose Route Frequency    insulin NPH (NOVOLIN N, HUMULIN N) injection 16 Units  16 Units SubCUTAneous ACB&D    ondansetron (ZOFRAN) injection 4 mg  4 mg IntraVENous Q6H PRN    cefepime (MAXIPIME) 2 g in sterile water (preservative free) 10 mL IV syringe  2 g IntraVENous Q12H    aspirin chewable tablet 81 mg  81 mg Oral DAILY    clopidogreL (PLAVIX) tablet 75 mg  75 mg Oral DAILY    levothyroxine (SYNTHROID) tablet 50 mcg  50 mcg Oral ACB    montelukast (SINGULAIR) tablet 10 mg  10 mg Oral DAILY    QUEtiapine (SEROquel) tablet 100 mg  100 mg Oral BID    escitalopram oxalate (LEXAPRO) tablet 20 mg  20 mg Oral DAILY    amLODIPine (NORVASC) tablet 5 mg  5 mg Oral DAILY    metoprolol succinate (TOPROL-XL) XL tablet 25 mg  25 mg Oral DAILY    famotidine (PEPCID) tablet 20 mg  20 mg Oral QHS    insulin lispro (HUMALOG) injection   SubCUTAneous AC&HS    glucose chewable tablet 16 g  4 Tablet Oral PRN    dextrose (D50W) injection syrg 12.5-25 g  12.5-25 g IntraVENous PRN    glucagon (GLUCAGEN) injection 1 mg  1 mg IntraMUSCular PRN    ipratropium-albuterol (COMBIVENT RESPIMAT) 20 mcg-100 mcg inhalation spray  1 Puff Inhalation Q6H PRN    heparin (porcine) injection 5,000 Units  5,000 Units SubCUTAneous Q8H    haloperidol lactate (HALDOL) injection 1 mg  1 mg IntraMUSCular Q6H PRN    doxycycline (VIBRAMYCIN) 100 mg in 0.9% sodium chloride (MBP/ADV) 100 mL MBP  100 mg IntraVENous Q12H    sodium chloride (NS) flush 5-10 mL  5-10 mL IntraVENous PRN    sodium chloride (NS) flush 5-40 mL  5-40 mL IntraVENous Q8H    sodium chloride (NS) flush 5-40 mL  5-40 mL IntraVENous PRN    acetaminophen (TYLENOL) tablet 650 mg  650 mg Oral Q6H PRN    Or    acetaminophen (TYLENOL) suppository 650 mg  650 mg Rectal Q6H PRN    polyethylene glycol (MIRALAX) packet 17 g  17 g Oral DAILY PRN       Care Plan discussed with: Patient/Family and Nurse    Total time spent with patient: 30 minutes.     Tara Blake MD

## 2021-09-01 NOTE — PROGRESS NOTES
Bedside and Verbal shift change report given to Radha (oncoming nurse) by Daya Fraga (offgoing nurse). Report included the following information SBAR, Kardex, Intake/Output, MAR and Recent Results.

## 2021-09-01 NOTE — PROGRESS NOTES
Comprehensive Nutrition Assessment    Type and Reason for Visit: Initial, RD nutrition re-screen/LOS    Nutrition Recommendations/Plan:   1. Recommend liberalizing patient diet in attempt to increase intake. 2. Consider initiation of appetite stimulant. 3. Initiate Glucerna BID to aid in meeting kcal/protein needs (440 kcal, 52 g carbs, 20 g protein)       Nutrition Assessment:    Pt is an 79year old female [facility] [A41.9, R65.20]. She  has a past medical history of Chronic obstructive pulmonary disease, Diabetes, Diverticulitis, DVT, History of heart artery stent, Hypertension, Hypothyroid, and Renal failure. Patient unable to provide meaningful information at time of visit. Unable to reach listed emergency contacts. Patient had not consumed lunch, showed no interest in food with RD. Patient Vitals for the past 168 hrs:   % Diet Eaten   09/01/21 0808 26 - 50%   08/31/21 2052 1 - 25%   08/31/21 1855 1 - 25%   08/31/21 1124 26 - 50%   08/31/21 0806 1 - 25%   08/30/21 0936 1 - 25%   08/28/21 1721 26 - 50%   08/28/21 1423 1 - 25%   08/28/21 1028 1 - 25%       Wt Readings from Last 10 Encounters:   08/31/21 73 kg (161 lb)   12/03/18 77.6 kg (171 lb)   02/09/18 77.6 kg (171 lb)   12/20/15 75 kg (165 lb 6 oz)   09/16/15 74.8 kg (165 lb)       Malnutrition Assessment:  Malnutrition Status: At risk for malnutrition (specify)    Context:  Social/environmental circumstances     Findings of the 6 clinical characteristics of malnutrition:   Energy Intake:  Unable to assess  Weight Loss:  No significant weight loass     Body Fat Loss:  1 - Mild body fat loss, Orbital, Buccal region   Muscle Mass Loss:  7 - Severe muscle mass loss, Clavicles (pectoralis &deltoids), Calf (gastrocnemius), Scapula (trapezius), Temples (temporalis)  Fluid Accumulation:  No significant fluid accumulation,     Strength:  Not performed       Estimated Daily Nutrient Needs:  Energy (kcal): 1243-9535 (25-30);  Weight Used for Energy Requirements: Current  Protein (g): 73-87 (1.0-1.2); Weight Used for Protein Requirements: Current  Fluid (ml/day): 8926-3376; Method Used for Fluid Requirements: 1 ml/kcal    Nutrition Related Findings:       ABD: WNL 9/1  Last BM: 8/27  Edema: None noted 9/1    Nutr. Labs:  K 3.4 (L)  BUN 33 (H)  Cl 116 (H)  Lab Results   Component Value Date/Time    Glucose 64 (L) 09/01/2021 05:01 AM    Glucose (POC) 97 09/01/2021 11:30 AM     Lab Results   Component Value Date/Time    Hemoglobin A1c 7.8 (H) 08/28/2021 06:06 AM       Nutr. Meds:  Norvasc  Plavix  Pepcid  Humalog  Humulin N  Synthroid  Metoprolol Succinate  Miralax PRN  Klor-Con 10    Wounds:    None noted 9/1       Current Nutrition Therapies:  ADULT DIET Regular; 4 carb choices (60 gm/meal); Low Fat/Low Chol/High Fiber/BESSY    Anthropometric Measures:  · Height:  5' 2.01\" (157.5 cm)  · Current Body Wt:  73 kg (160 lb 15 oz)   · Ideal Body Wt:  110 lbs:  146.3 %   · BMI:  29.44  · BMI Category: Overweight (BMI 25.0-29. 9)       Nutrition Diagnosis:   · Predicted inadequate energy intake related to cognitive or neurological impairment as evidenced by intake 26-50%    Nutrition Interventions:   Food and/or Nutrient Delivery: Continue current diet, Start oral nutrition supplement  Nutrition Education and Counseling: No recommendations at this time  Coordination of Nutrition Care: Continue to monitor while inpatient, Interdisciplinary rounds    Goals:  PO intake >/= 50% estimated protein needs within 2-3  days       Nutrition Monitoring and Evaluation:   Behavioral-Environmental Outcomes: None identified  Food/Nutrient Intake Outcomes: Food and nutrient intake, Supplement intake  Physical Signs/Symptoms Outcomes: Biochemical data, Weight    Discharge Planning:     Too soon to determine     Electronically signed by Nyla Zhang RD on 7/7/4308  Contact: [phone] or via trustedsafe

## 2021-09-02 LAB
GLUCOSE BLD STRIP.AUTO-MCNC: 119 MG/DL (ref 65–117)
GLUCOSE BLD STRIP.AUTO-MCNC: 148 MG/DL (ref 65–117)
GLUCOSE BLD STRIP.AUTO-MCNC: 195 MG/DL (ref 65–117)
SERVICE CMNT-IMP: ABNORMAL

## 2021-09-02 PROCEDURE — 74011636637 HC RX REV CODE- 636/637: Performed by: FAMILY MEDICINE

## 2021-09-02 PROCEDURE — 74011636637 HC RX REV CODE- 636/637: Performed by: INTERNAL MEDICINE

## 2021-09-02 PROCEDURE — 77010033678 HC OXYGEN DAILY

## 2021-09-02 PROCEDURE — 74011000258 HC RX REV CODE- 258: Performed by: FAMILY MEDICINE

## 2021-09-02 PROCEDURE — 97530 THERAPEUTIC ACTIVITIES: CPT

## 2021-09-02 PROCEDURE — 74011250636 HC RX REV CODE- 250/636: Performed by: INTERNAL MEDICINE

## 2021-09-02 PROCEDURE — 74011000250 HC RX REV CODE- 250: Performed by: FAMILY MEDICINE

## 2021-09-02 PROCEDURE — 74011250637 HC RX REV CODE- 250/637: Performed by: FAMILY MEDICINE

## 2021-09-02 PROCEDURE — 94760 N-INVAS EAR/PLS OXIMETRY 1: CPT

## 2021-09-02 PROCEDURE — 97116 GAIT TRAINING THERAPY: CPT

## 2021-09-02 PROCEDURE — 97535 SELF CARE MNGMENT TRAINING: CPT

## 2021-09-02 PROCEDURE — 74011250637 HC RX REV CODE- 250/637: Performed by: INTERNAL MEDICINE

## 2021-09-02 PROCEDURE — 74011250636 HC RX REV CODE- 250/636: Performed by: FAMILY MEDICINE

## 2021-09-02 PROCEDURE — 65270000029 HC RM PRIVATE

## 2021-09-02 PROCEDURE — 82962 GLUCOSE BLOOD TEST: CPT

## 2021-09-02 RX ORDER — AMLODIPINE BESYLATE 5 MG/1
10 TABLET ORAL DAILY
Status: DISCONTINUED | OUTPATIENT
Start: 2021-09-02 | End: 2021-09-11 | Stop reason: HOSPADM

## 2021-09-02 RX ORDER — SODIUM CHLORIDE 9 MG/ML
75 INJECTION, SOLUTION INTRAVENOUS CONTINUOUS
Status: DISCONTINUED | OUTPATIENT
Start: 2021-09-02 | End: 2021-09-06

## 2021-09-02 RX ADMIN — Medication 10 ML: at 21:43

## 2021-09-02 RX ADMIN — INSULIN HUMAN 10 UNITS: 100 INJECTION, SUSPENSION SUBCUTANEOUS at 16:44

## 2021-09-02 RX ADMIN — CEFEPIME HYDROCHLORIDE 2 G: 2 INJECTION, POWDER, FOR SOLUTION INTRAVENOUS at 16:44

## 2021-09-02 RX ADMIN — HEPARIN SODIUM 5000 UNITS: 5000 INJECTION INTRAVENOUS; SUBCUTANEOUS at 06:02

## 2021-09-02 RX ADMIN — SODIUM CHLORIDE 75 ML/HR: 9 INJECTION, SOLUTION INTRAVENOUS at 11:00

## 2021-09-02 RX ADMIN — Medication 10 ML: at 13:25

## 2021-09-02 RX ADMIN — INSULIN LISPRO 2 UNITS: 100 INJECTION, SOLUTION INTRAVENOUS; SUBCUTANEOUS at 11:44

## 2021-09-02 RX ADMIN — INSULIN LISPRO 2 UNITS: 100 INJECTION, SOLUTION INTRAVENOUS; SUBCUTANEOUS at 16:44

## 2021-09-02 RX ADMIN — DOXYCYCLINE 100 MG: 100 INJECTION, POWDER, LYOPHILIZED, FOR SOLUTION INTRAVENOUS at 08:44

## 2021-09-02 RX ADMIN — ONDANSETRON 4 MG: 2 INJECTION INTRAMUSCULAR; INTRAVENOUS at 17:07

## 2021-09-02 RX ADMIN — Medication 10 ML: at 06:05

## 2021-09-02 RX ADMIN — MONTELUKAST 10 MG: 10 TABLET, FILM COATED ORAL at 08:25

## 2021-09-02 RX ADMIN — FAMOTIDINE 20 MG: 20 TABLET ORAL at 21:43

## 2021-09-02 RX ADMIN — HYDRALAZINE HYDROCHLORIDE 10 MG: 20 INJECTION INTRAMUSCULAR; INTRAVENOUS at 16:45

## 2021-09-02 NOTE — PROGRESS NOTES
Refusing po meds upon multiple attempts. Patient is confused, unable to follow simple commands at intervals. Able to administer IV ABT. Md notified. Poor po intake, IVF started.

## 2021-09-02 NOTE — PROGRESS NOTES
Bedside and Verbal shift change report given to KATHIA Mendez (oncoming nurse) by Imtiaz Biswas (offgoing nurse). Report included the following information SBAR, Kardex, Intake/Output, MAR, Recent Results and Med Rec Status.

## 2021-09-02 NOTE — PROGRESS NOTES
Bedside shift change report given to [address] [place] [address] (oncoming nurse) by Keaton Streeter (offgoing nurse). Report included the following information SBAR, Kardex, MAR and Recent Results.

## 2021-09-02 NOTE — PROGRESS NOTES
Problem: Mobility Impaired (Adult and Pediatric)  Goal: *Acute Goals and Plan of Care (Insert Text)  Description: FUNCTIONAL STATUS PRIOR TO ADMISSION: Patient was modified independent using a rolling walker for functional mobility. HOME SUPPORT PRIOR TO ADMISSION: The patient lived with daughter but did not require assist.    Physical Therapy Goals  Initiated 8/30/2021  1. Patient will move from supine to sit and sit to supine , scoot up and down, and roll side to side in bed with independence within 7 day(s). 2.  Patient will transfer from bed to chair and chair to bed with modified independence using the least restrictive device within 7 day(s). 3.  Patient will perform sit to stand with modified independence within 7 day(s). 4.  Patient will ambulate with modified independence for 200 feet with the least restrictive device within 7 day(s). Outcome: Progressing Towards Goal   PHYSICAL THERAPY TREATMENT  Patient: Masoud Pizano (22 y.o. female)  Date: 9/2/2021  Diagnosis: [facility] [A41.9, R65.20] <principal problem not specified>       Precautions:    Chart, physical therapy assessment, plan of care and goals were reviewed. ASSESSMENT  Patient continues with skilled PT services and is progressing towards goals. Patient smiling and singing. Follows some commands, does best with visual and manual cues. Performed transfer and gait training in the room. Patient demonstrates good balance. Incontinent of urine. Sat up in bedside chair. Pulse ox on room air 96%    Current Level of Function Impacting Discharge (mobility/balance): min assist for safe mobility    Other factors to consider for discharge: patient has communication difficulties         PLAN :  Patient continues to benefit from skilled intervention to address the above impairments. Continue treatment per established plan of care. to address goals.     Recommendation for discharge: (in order for the patient to meet his/her long term goals)  Therapy up to 5 days/week in SNF setting    This discharge recommendation:  Has not yet been discussed the attending provider and/or case management    IF patient discharges home will need the following DME: none       SUBJECTIVE:   Patient singing a rhyming song, but does not answer questions    OBJECTIVE DATA SUMMARY:   Critical Behavior:  Neurologic State: Confused  Orientation Level: Oriented X4  Cognition: Follows commands  Safety/Judgement:  (decresed safety awareness)  Functional Mobility Training:  Bed Mobility:     Supine to Sit: Modified independent              Transfers:  Sit to Stand: Minimum assistance;Assist x1  Stand to Sit: Contact guard assistance;Assist x1;Additional time                             Balance:     Ambulation/Gait Training:  Distance (ft): 5 Feet (ft)  Assistive Device: Gait belt  Ambulation - Level of Assistance: Minimal assistance;Assist x1                          Step Length: Right shortened;Left shortened           Pain Rating:  Pt denies pain    Activity Tolerance:   Good    After treatment patient left in no apparent distress:   Sitting in chair, Heels elevated for pressure relief, Call bell within reach, and Bed / chair alarm activated    COMMUNICATION/COLLABORATION:   The patients plan of care was discussed with: Occupational therapist and Registered nurse.      Freddy Moreno, PT   Time Calculation: 28 mins

## 2021-09-02 NOTE — PROGRESS NOTES
Problem: Self Care Deficits Care Plan (Adult)  Goal: *Acute Goals and Plan of Care (Insert Text)  Description:   FUNCTIONAL STATUS PRIOR TO ADMISSION: Patient not able to give history and no family present. Per chart patient lives with niece, but was ambulatory without assistance. HOME SUPPORT: The patient lived with family. Occupational Therapy Goals  Initiated 8/30/2021  1. Patient will perform self-feeding with supervision/set-up within 7 day(s). 2.  Patient will perform grooming with supervision/set-up within 7 day(s). 3.  Patient will perform toilet transfers with supervision/set-up within 7 day(s). 4.  Patient will perform all aspects of toileting with supervision/set-up within 7 day(s). Outcome: Progressing Towards Goal   OCCUPATIONAL THERAPY TREATMENT  Patient: Muna Lopez (10 y.o. female)  Date: 9/2/2021  Diagnosis: Severe sepsis ([facility] [A41.9, R65.20] <principal problem not specified>       Precautions:    Chart, occupational therapy assessment, plan of care, and goals were reviewed. ASSESSMENT  Patient continues with skilled OT services and is progressing towards goals. Patient  received in bed pleasant and agreeable. She is able to follow tactile cues for tasks with increased time. Patient alert but oriented only to self. Patient to EOB, and requires min assist x1 for sit to stand. Once standing noted patient with saturated pad. Max/total assist for hygiene and donning protective brief. Patient able to transfer to chair set up next to bed. Patient provided with comb and uses left hand to comb hair. Min assist over all for reaching to back of head. Patient left in NAD in chair, alarm set. Current Level of Function Impacting Discharge (ADLs):     Other factors to consider for discharge:          PLAN :  Patient continues to benefit from skilled intervention to address the above impairments.   Continue treatment per established plan of care to address goals. Recommend with staff: opportunity for ADLs if able to participate    Recommend next OT session: continue goals     Recommendation for discharge: (in order for the patient to meet his/her long term goals)  Therapy up to 5 days/week in SNF setting    This discharge recommendation:  Has been made in collaboration with the attending provider and/or case management    IF patient discharges home will need the following DME: TBD       SUBJECTIVE:   Patient stated Oh hold me close. ..  Patient singing throughout session  OBJECTIVE DATA SUMMARY:   Cognitive/Behavioral Status:  Neurologic State: Alert;Confused  Orientation Level: Disoriented to person;Disoriented to place; Disoriented to situation;Disoriented to time  Cognition: Decreased attention/concentration;Poor safety awareness;Memory loss  Perception: Appears intact  Perseveration: Perseverates during conversation  Safety/Judgement: Decreased awareness of environment    Functional Mobility and Transfers for ADLs:  Bed Mobility:  Supine to Sit: Modified independent    Transfers:  Sit to Stand: Minimum assistance;Assist x1          Balance:       ADL Intervention:       Grooming  Brushing/Combing Hair: Minimum assistance                        Toileting  Bladder Hygiene: Maximum assistance  Clothing Management: Total assistance (dependent)  Cues: Physical assistance for pants down;Physical assistance for pants up;Verbal cues provided    Cognitive Retraining  Safety/Judgement: Decreased awareness of environment    Therapeutic Exercises:     Pain:    Activity Tolerance:   Fair    After treatment patient left in no apparent distress:   Sitting in chair, Call bell within reach, and Bed / chair alarm activated    COMMUNICATION/COLLABORATION:   The patients plan of care was discussed with: Physical therapist and Registered nurse.      Shana Lee OTR/L  Time Calculation: 27 mins

## 2021-09-02 NOTE — PROGRESS NOTES
Bedside and Verbal shift change report given to [address] [place] [address] (oncoming nurse) by Susan Farfan (offgoing nurse). Report included the following information SBAR, Kardex and MAR.

## 2021-09-02 NOTE — PROGRESS NOTES
9/2/2021  Case Management Progress Note    4:22 PM  Spoke with patient's granddaughter Aguila Gardner. She was on speakerphone with her mother, patient's daughter. Informed them about [facility] denying. They have no further preference at this time, so will send out referral to the [facility], [facility], [facility] (all local). SUJRIT Gallardo    4:12 PM  Noted that [facility] has denied the patient for a bed, I will try back with the [facility] as well. I attempted to call patient's decision maker to discuss further choices, however there was a problem with the connection and she asked to call right back. Will follow up. SURJIT Gallardo    11:56 AM  Patient is 79year old female admitted 8/29 with severe sepsis. Patient's RUR is 23% yellow/moderate risk for readmission  Chart reviewed--patient discussed in IDR rounds this morning. Referral is still pending for [facility] rehab, we are looking to skill patient at first and then long term care. Medicaid screening was done this morning. Patient will need a UAI, which we will work on. Will continue to follow and update. Transition of Care Plan  1. Continue medical management/treatment  2. Looking for SNF/LTC placement  3. Referral sent to [facility]   4. Medicaid application done   5. Will work on UAI  6.  CM will follow    SURJIT Gallardo

## 2021-09-02 NOTE — PROGRESS NOTES
Daily Progress Note: 9/2/2021  Yessica Link, Ceferino Chavez MD    Assessment/Plan:   Severe sepsis / septic shock: 2/2 PNA. -CXR showed patchy bilateral airspace disease.   -Check RVP/COVID-PCR. - Negative  -Start cefepime and Doxy-Doxy completed  -BC neg  -Procalcitonin elevated  -Send sputum culture, PNA workup (Legionella and Strep Pneumo urine ag, Mycoplasma IgM)     Chronic obstructive pulmonary disease: no wheezing.   -Combivent PRN     Hypertension:   -Restarted Norvasc and metoprolol 8/28  -Increased Norvasc to 10 q day (9/2)     Hypothyroid:   -check TSH. Continue LT4     CAD (coronary artery disease) / History of heart artery stent:   -cont DAPT, statin.      Diabetes: type 2, associated w/ CKD4.   -Poor control.   -On determir at home.   -Use NPH with SSI here     CKD (chronic kidney disease) stage 4, GFR 15-29 ml/min: stable. -Follow BMP     Acute metabolic encephalopathy 2/2 sepsis.   -Has dementia and is at risk for delirium.   -Avoid benzos, opioids, anticholinergics. -Verbally re-direct whenever possible.   -Avoid chemical restraints unless pt is a danger to self or others.  -Haldol as needed  -Wrist restraints as needed when family not present  -Home Seroquel restarted      Thrombocytopenia: chronic, stable.    -Follow PLT     Code Status: DNR     Problem List:  Problem List as of 9/2/2021 Date Reviewed: 8/26/2021        Codes Class Noted - Resolved    Thrombocytopenia ([facility] ICD-10-CM: D69.6  ICD-9-CM: 287.5  8/27/2021 - Present        Chronic obstructive pulmonary disease (HCC) ICD-10-CM: J44.9  ICD-9-CM: 80  Unknown - Present        Hypertension ICD-10-CM: I10  ICD-9-CM: 401.9  Unknown - Present        Hypothyroid ICD-10-CM: E03.9  ICD-9-CM: 244.9  Unknown - Present        CAD (coronary artery disease) ICD-10-CM: I25.10  ICD-9-CM: 414.00  Unknown - Present        History of heart artery stent ICD-10-CM: Z95.5  ICD-9-CM: V45.82  Unknown - Present        Diabetes ([facility] ICD-10-CM: E11.9  ICD-9-CM: 250.00  Unknown - Present        CKD (chronic kidney disease) stage 4, GFR 15-29 ml/min (HCC) ICD-10-CM: N18.4  ICD-9-CM: 591. 4  Unknown - Present        Severe sepsis (HCC) ICD-10-CM: A41.9, R65.20  ICD-9-CM: 038.9, 995.92  Unknown - Present        Pneumonia ICD-10-CM: J18.9  ICD-9-CM: 574  Unknown - Present        Dementia ([facility] ICD-10-CM: F03.90  ICD-9-CM: 294.20  8/26/2021 - Present        Acute metabolic encephalopathy SZV-34-KARLY: G93.41  ICD-9-CM: 348.31  8/26/2021 - Present              Subjective:    79 y.o. female w/ hx of CAD, HTN, DM, CKD4, dementia presenting w/ fever, weakness. Started yesterday, febrile at home, associated with AMS and lethargy per family. Pt also c/o chest pain and cough. ED workup showed 3/4 SIRS with bilateral PNA evident on CXR. Ms. Barb Calvin is admitted for further evaluation and management. (Dr Bernarda Jacob)    8/27: She is confused this am. Not able to provide any history and family not present. Off pressors. BP stable. 8/28: Confused. Has been more agitated during the night requiring restraints. Change antibiotic to Doxy so that we could use Haldol. BP is higher so will restart meds. Ask case management to see.     8/29: Daughter at bedside. She is less confused this am. A little more talkative. Family willing to look at rehab as she needs 24 hour care. Discussed with daughter that she should not be left alone. Her daughter lives out of town and works so her niece has been caring for her. 8/30:Sleeping but arouses. Nurses report she had a good night. WBC and Cr have improved. Afebrile. Case management following.     8/31: Pleasantly confused. Appetite has improved. Case management following for placement. Will continue IV antibiotics. Recheck CXR.     9/1: Family looking for SNF/Long term placement. Case management following. WBC has improved. CXR improving. Will continue IV antibiotics for now.      9/2: CM waiting to hear back from [facility] SNF/LTC for bed availability. Pt seen by PT/OT yesterday, did not tolerate well, max assist from laying to sit. Total care for ADL's. Patient on cefepime and doxy. WBC 11.9 (). Patient refused labs this morning as well as her cefepime. Will try to get labs drawn this afternoon and administer cefepime if patient is more cooperative. Increased Norvasc due to HTN     Review of Systems:   Review of systems not obtained due to patient factors. Objective:   Physical Exam:     Visit Vitals  BP (!) 179/67 (BP 1 Location: Left upper arm, BP Patient Position: At rest)   Pulse 65   Temp 97.6 °F (36.4 °C)   Resp 16   Ht 5' 2.01\" (1.575 m)   Wt 73 kg (161 lb)   SpO2 96%   BMI 29.44 kg/m²    O2 Flow Rate (L/min): 2 l/min O2 Device: Nasal cannula  Temp (24hrs), Av.5 °F (36.4 °C), Min:96.9 °F (36.1 °C), Max:97.9 °F (36.6 °C)    1901 -  0700  In: 150 [P.O.:150]  Out: 0    701 -  190  In: 654 [P.O.:920]  Out: 400 [Urine:400]  General:  Confused,  no distress, frail appearing. Agitated. Head:  Normocephalic, without obvious abnormality, atraumatic. Eyes:  Conjunctivae/corneas clear   Nose: Nares normal. Septum midline. Mucosa normal. No drainage or sinus tenderness. Throat: Lips, mucosa, and tongue moist..   Neck: Supple, symmetrical, trachea midline, no adenopathy, thyroid: no enlargement/tenderness/nodules, no carotid bruit and no JVD. Back:   Symmetric, no curvature. ROM normal. No CVA tenderness. Lungs:   Clear to auscultation bilaterally. Chest wall:  No tenderness or deformity. Heart:  Regular rate and rhythm, S1, S2 normal, no murmur, click, rub or gallop. Abdomen:   Soft, non-tender. Bowel sounds normal. No masses,  No organomegaly. Extremities: no cyanosis or edema. No calf tenderness or cords. Pulses: 2+ and symmetric all extremities. Skin: Skin color, texture, turgor normal. No rashes or lesions   Neurologic: CNII-XII intact. Alert and oriented X 1.   Fine motor of hands and fingers normal.   equal.  No cogwheeling or rigidity. Gait not tested at this time. Sensation grossly normal to touch. Gross motor of extremities normal.       Data Review:    Port CXR 8/27/21   INDICATION: fever, cough  EXAM:  AP CHEST RADIOGRAPH  COMPARISON: December 4, 2018  FINDINGS:  AP portable view of the chest demonstrates right subclavian pacemaker. Heart  size is normal. Patchy airspace disease throughout the right lung and left lung  base. No pneumothorax. The osseous structures are unremarkable. IMPRESSION  Patchy bilateral airspace disease. Portable CXR 8/28/21  IMPRESSION  Improved aeration the right midlung and lower lung zones with  residual patchy airspace opacity. Small left pleural effusion. CXR 8/31/21  IMPRESSION  Improved aeration the right lung with minimal residual patchy  airspace opacity in the right midlung zone. .    Interpretation Summary- ECHO 8/31/21    · LV: Estimated LVEF is 55 - 60%. Normal cavity size and systolic function (ejection fraction normal). Upper normal wall thickness. Wall motion: normal. Mild (grade 1) left ventricular diastolic dysfunction. · Image quality for this study was technically difficult. · MV: Moderate mitral annular calcification. Mild mitral valve regurgitation is present. · LA: Mildly dilated left atrium. · AV: Mild aortic valve stenosis is present. · RV: Pacing wire present       Recent Days:  Recent Labs     09/01/21  0501   WBC 11.9*   HGB 11.5   HCT 35.3        Recent Labs     09/01/21  0501      K 3.4*   *   CO2 22   GLU 64*   BUN 33*   CREA 1.27*   CA 9.3   ALB 2.9*   TBILI 0.6   ALT 18     No results for input(s): PH, PCO2, PO2, HCO3, FIO2 in the last 72 hours.     24 Hour Results:  Recent Results (from the past 24 hour(s))   GLUCOSE, POC    Collection Time: 09/01/21  8:14 AM   Result Value Ref Range    Glucose (POC) 80 65 - 117 mg/dL    Performed by Мария Hernandez POC    Collection Time: 09/01/21 11:30 AM   Result Value Ref Range    Glucose (POC) 97 65 - 117 mg/dL    Performed by [address] [place] [address], POC    Collection Time: 09/01/21  3:58 PM   Result Value Ref Range    Glucose (POC) 91 65 - 117 mg/dL    Performed by Luz Maria Florez, POC    Collection Time: 09/01/21  9:05 PM   Result Value Ref Range    Glucose (POC) 105 65 - 117 mg/dL    Performed by Martha Valdes (PCT)    GLUCOSE, POC    Collection Time: 09/02/21  6:00 AM   Result Value Ref Range    Glucose (POC) 119 (H) 65 - 117 mg/dL    Performed by Regi Guevara        Medications reviewed  Current Facility-Administered Medications   Medication Dose Route Frequency    insulin NPH (NOVOLIN N, HUMULIN N) injection 10 Units  10 Units SubCUTAneous ACB&D    amLODIPine (NORVASC) tablet 10 mg  10 mg Oral DAILY    potassium chloride SR (KLOR-CON 10) tablet 20 mEq  20 mEq Oral BID    hydrALAZINE (APRESOLINE) 20 mg/mL injection 10 mg  10 mg IntraVENous Q6H PRN    ondansetron (ZOFRAN) injection 4 mg  4 mg IntraVENous Q6H PRN    cefepime (MAXIPIME) 2 g in sterile water (preservative free) 10 mL IV syringe  2 g IntraVENous Q12H    aspirin chewable tablet 81 mg  81 mg Oral DAILY    clopidogreL (PLAVIX) tablet 75 mg  75 mg Oral DAILY    levothyroxine (SYNTHROID) tablet 50 mcg  50 mcg Oral ACB    montelukast (SINGULAIR) tablet 10 mg  10 mg Oral DAILY    QUEtiapine (SEROquel) tablet 100 mg  100 mg Oral BID    escitalopram oxalate (LEXAPRO) tablet 20 mg  20 mg Oral DAILY    metoprolol succinate (TOPROL-XL) XL tablet 25 mg  25 mg Oral DAILY    famotidine (PEPCID) tablet 20 mg  20 mg Oral QHS    insulin lispro (HUMALOG) injection   SubCUTAneous AC&HS    glucose chewable tablet 16 g  4 Tablet Oral PRN    dextrose (D50W) injection syrg 12.5-25 g  12.5-25 g IntraVENous PRN    glucagon (GLUCAGEN) injection 1 mg  1 mg IntraMUSCular PRN    ipratropium-albuterol (COMBIVENT RESPIMAT) 20 mcg-100 mcg inhalation spray  1 Puff Inhalation Q6H PRN    heparin (porcine) injection 5,000 Units  5,000 Units SubCUTAneous Q8H    haloperidol lactate (HALDOL) injection 1 mg  1 mg IntraMUSCular Q6H PRN    doxycycline (VIBRAMYCIN) 100 mg in 0.9% sodium chloride (MBP/ADV) 100 mL MBP  100 mg IntraVENous Q12H    sodium chloride (NS) flush 5-10 mL  5-10 mL IntraVENous PRN    sodium chloride (NS) flush 5-40 mL  5-40 mL IntraVENous Q8H    sodium chloride (NS) flush 5-40 mL  5-40 mL IntraVENous PRN    acetaminophen (TYLENOL) tablet 650 mg  650 mg Oral Q6H PRN    Or    acetaminophen (TYLENOL) suppository 650 mg  650 mg Rectal Q6H PRN    polyethylene glycol (MIRALAX) packet 17 g  17 g Oral DAILY PRN       Care Plan discussed with: Patient/Family and Nurse    Total time spent with patient: 30 minutes.     Breanna Pate MD

## 2021-09-03 LAB
BACTERIA SPEC CULT: NORMAL
GLUCOSE BLD STRIP.AUTO-MCNC: 108 MG/DL (ref 65–117)
GLUCOSE BLD STRIP.AUTO-MCNC: 115 MG/DL (ref 65–117)
SERVICE CMNT-IMP: NORMAL

## 2021-09-03 PROCEDURE — 94760 N-INVAS EAR/PLS OXIMETRY 1: CPT

## 2021-09-03 PROCEDURE — 97530 THERAPEUTIC ACTIVITIES: CPT

## 2021-09-03 PROCEDURE — 97116 GAIT TRAINING THERAPY: CPT

## 2021-09-03 PROCEDURE — 82962 GLUCOSE BLOOD TEST: CPT

## 2021-09-03 PROCEDURE — 74011250636 HC RX REV CODE- 250/636: Performed by: INTERNAL MEDICINE

## 2021-09-03 PROCEDURE — 65270000029 HC RM PRIVATE

## 2021-09-03 RX ADMIN — Medication 10 ML: at 06:28

## 2021-09-03 RX ADMIN — Medication 10 ML: at 13:24

## 2021-09-03 RX ADMIN — HEPARIN SODIUM 5000 UNITS: 5000 INJECTION INTRAVENOUS; SUBCUTANEOUS at 13:24

## 2021-09-03 NOTE — PROGRESS NOTES
Patient refused blood sugar check. Insulin held. IV occluded and upon trying to flush and restart the IV infusion, the patient became agitated and guarding against allowing nurse access to IV site. IV not infusing at this time.

## 2021-09-03 NOTE — PROGRESS NOTES
9/3/2021  Case Management Progress Note    1:50 PM  Spoke with Yusef Bianchi @ the [facility], they can accept the patient and will start [place] authorization today. SURJIT Roberts    11:46 AM  Patient is 79year old female admitted 8/29 with severe sepsis. Patient's RUR is 24% yellow/moderate risk for readmission  Chart reviewed--patient discussed in IDR rounds this morning. Patient is medically stable at this point--we are looking for placement for her, likely skilled at first and then transition to long term care. [facility] denied, as has [facility]. [facility] does not have long term care beds at this time. [facility] still considering and [facility]. Will follow up with both of these this afternoon. Transition of Care Plan   1. Continue medical management  2. Will follow up with facilities this afternoon   3. [facility] and [facility] considering  4. Patient will need [facility] once accepting facility found.    5. CM will follow    SURJIT Roberts

## 2021-09-03 NOTE — PROGRESS NOTES
Daily Progress Note: 9/3/2021  Aydin Kwong MD    Assessment/Plan:   Severe sepsis / septic shock: 2/2 PNA. -CXR showed patchy bilateral airspace disease.   -Check RVP/COVID-PCR. - Negative  -Completed cefepime and Doxy  -BC neg  -Send sputum culture, PNA workup (Legionella and Strep Pneumo urine ag, Mycoplasma IgM)     Chronic obstructive pulmonary disease: no wheezing.   -Combivent PRN     Hypertension:   -Restarted Norvasc and metoprolol 8/28  -Increased Norvasc to 10 q day (9/2)     Hypothyroid:   -check TSH. Continue LT4     CAD (coronary artery disease) / History of heart artery stent:   -cont DAPT, statin.      Diabetes: type 2, associated w/ CKD4.   -Poor control.   -On determir at home.   -Use NPH with SSI here     CKD (chronic kidney disease) stage 4, GFR 15-29 ml/min: stable. -Follow BMP     Acute metabolic encephalopathy 2/2 sepsis.   -Has dementia and is at risk for delirium.   -Avoid benzos, opioids, anticholinergics. -Verbally re-direct whenever possible.   -Avoid chemical restraints unless pt is a danger to self or others.  -Haldol as needed  -Wrist restraints as needed when family not present  -Home Seroquel restarted      Thrombocytopenia: chronic, stable.    -Follow PLT     Code Status: DNR     Problem List:  Problem List as of 9/3/2021 Date Reviewed: 8/26/2021        Codes Class Noted - Resolved    [facility] ICD-10-CM: D69.6  ICD-9-CM: 287.5  8/27/2021 - Present        Chronic obstructive pulmonary disease (HCC) ICD-10-CM: J44.9  ICD-9-CM: 80  Unknown - Present        Hypertension ICD-10-CM: I10  ICD-9-CM: 401.9  Unknown - Present        Hypothyroid ICD-10-CM: E03.9  ICD-9-CM: 244.9  Unknown - Present        CAD (coronary artery disease) ICD-10-CM: I25.10  ICD-9-CM: 414.00  Unknown - Present        History of heart artery stent ICD-10-CM: Z95.5  ICD-9-CM: V45.82  Unknown - Present        Diabetes ([facility] ICD-10-CM: E11.9  ICD-9-CM: 250.00 Unknown - Present        CKD (chronic kidney disease) stage 4, GFR 15-29 ml/min (HCC) ICD-10-CM: N18.4  ICD-9-CM: 386. 4  Unknown - Present        Severe sepsis (HCC) ICD-10-CM: A41.9, R65.20  ICD-9-CM: 038.9, 995.92  Unknown - Present        Pneumonia ICD-10-CM: J18.9  ICD-9-CM: 433  Unknown - Present        Dementia ([facility] ICD-10-CM: F03.90  ICD-9-CM: 294.20  8/26/2021 - Present        Acute metabolic encephalopathy HYL-80-TC: G93.41  ICD-9-CM: 348.31  8/26/2021 - Present              Subjective:    79 y.o. female w/ hx of CAD, HTN, DM, CKD4, dementia presenting w/ fever, weakness. Started yesterday, febrile at home, associated with AMS and lethargy per family. Pt also c/o chest pain and cough. ED workup showed 3/4 SIRS with bilateral PNA evident on CXR. Ms. Barb Calvin is admitted for further evaluation and management. (Dr Bernarda Jacob)    8/27: She is confused this am. Not able to provide any history and family not present. Off pressors. BP stable. 8/28: Confused. Has been more agitated during the night requiring restraints. Change antibiotic to Doxy so that we could use Haldol. BP is higher so will restart meds. Ask case management to see.     8/29: Daughter at bedside. She is less confused this am. A little more talkative. Family willing to look at rehab as she needs 24 hour care. Discussed with daughter that she should not be left alone. Her daughter lives out of town and works so her niece has been caring for her. 8/30:Sleeping but arouses. Nurses report she had a good night. WBC and Cr have improved. Afebrile. Case management following.     8/31: Pleasantly confused. Appetite has improved. Case management following for placement. Will continue IV antibiotics. Recheck CXR.     9/1: Family looking for SNF/Long term placement. Case management following. WBC has improved. CXR improving. Will continue IV antibiotics for now. 9/2: CM waiting to hear back from [facility] SNF/LTC for bed availability.  Pt seen by PT/OT yesterday, did not tolerate well, max assist from laying to sit. Total care for ADL's. Patient on cefepime and doxy. WBC 11.9 (). Patient refused labs this morning as well as her cefepime. Will try to get labs drawn this afternoon and administer cefepime if patient is more cooperative. Increased Norvasc due to HTN    9/3: More agitated and refusing blood draws. Poor appetite. IVF resumed. Family looking for placement.      Review of Systems:   Review of systems not obtained due to patient factors. Objective:   Physical Exam:     Visit Vitals  BP (!) 148/54 (BP 1 Location: Right upper arm, BP Patient Position: At rest)   Pulse 62   Temp 97.3 °F (36.3 °C)   Resp 20   Ht 5' 2.01\" (1.575 m)   Wt 161 lb (73 kg)   SpO2 92%   BMI 29.44 kg/m²    O2 Flow Rate (L/min): 2 l/min O2 Device: None (Room air)  Temp (24hrs), Av.3 °F (36.3 °C), Min:97.3 °F (36.3 °C), Max:97.3 °F (36.3 °C)    1901 -  0700  In: 120 [P.O.:120]  Out: -     07 -  1900  In: 1106.3 [P.O.:530; I.V.:576.3]  Out: 500 [Urine:500]  General:  Confused,  no distress, frail appearing. Head:  Normocephalic, without obvious abnormality, atraumatic. Eyes:  Conjunctivae/corneas clear   Nose: Nares normal. Septum midline. Mucosa normal. No drainage or sinus tenderness. Throat: Lips, mucosa, and tongue moist..   Neck: Supple, symmetrical, trachea midline, no adenopathy, thyroid: no enlargement/tenderness/nodules, no carotid bruit and no JVD. Back:   Symmetric, no curvature. ROM normal. No CVA tenderness. Lungs:   Clear to auscultation bilaterally. Chest wall:  No tenderness or deformity. Heart:  Regular rate and rhythm, S1, S2 normal, no murmur, click, rub or gallop. Abdomen:   Soft, non-tender. Bowel sounds normal. No masses,  No organomegaly. Extremities: no cyanosis or edema. No calf tenderness or cords. Pulses: 2+ and symmetric all extremities.    Skin: Skin color, texture, turgor normal. No rashes or lesions   Neurologic: CNII-XII intact. Alert and oriented X 1. Fine motor of hands and fingers normal.   equal.  No cogwheeling or rigidity. Gait not tested at this time. Sensation grossly normal to touch. Gross motor of extremities normal.       Data Review:    Port CXR 8/27/21   INDICATION: fever, cough  EXAM:  AP CHEST RADIOGRAPH  COMPARISON: December 4, 2018  FINDINGS:  AP portable view of the chest demonstrates right subclavian pacemaker. Heart  size is normal. Patchy airspace disease throughout the right lung and left lung  base. No pneumothorax. The osseous structures are unremarkable. IMPRESSION  Patchy bilateral airspace disease. Portable CXR 8/28/21  IMPRESSION  Improved aeration the right midlung and lower lung zones with  residual patchy airspace opacity. Small left pleural effusion. CXR 8/31/21  IMPRESSION  Improved aeration the right lung with minimal residual patchy  airspace opacity in the right midlung zone. .    Interpretation Summary- ECHO 8/31/21    · LV: Estimated LVEF is 55 - 60%. Normal cavity size and systolic function (ejection fraction normal). Upper normal wall thickness. Wall motion: normal. Mild (grade 1) left ventricular diastolic dysfunction. · Image quality for this study was technically difficult. · MV: Moderate mitral annular calcification. Mild mitral valve regurgitation is present. · LA: Mildly dilated left atrium. · AV: Mild aortic valve stenosis is present. · RV: Pacing wire present       Recent Days:  Recent Labs     09/01/21  0501   WBC 11.9*   HGB 11.5   HCT 35.3        Recent Labs     09/01/21  0501      K 3.4*   *   CO2 22   GLU 64*   BUN 33*   CREA 1.27*   CA 9.3   ALB 2.9*   TBILI 0.6   ALT 18     No results for input(s): PH, PCO2, PO2, HCO3, FIO2 in the last 72 hours.     24 Hour Results:  Recent Results (from the past 24 hour(s))   GLUCOSE, POC    Collection Time: 09/02/21  6:00 AM   Result Value Ref Range    Glucose (POC) 119 (H) 65 - 117 mg/dL    Performed by Janet Lino    GLUCOSE, POC    Collection Time: 09/02/21 11:09 AM   Result Value Ref Range    Glucose (POC) 195 (H) 65 - 117 mg/dL    Performed by Vivien Weber    GLUCOSE, POC    Collection Time: 09/02/21  4:24 PM   Result Value Ref Range    Glucose (POC) 148 (H) 65 - 117 mg/dL    Performed by Bhragavi Avila        Medications reviewed  Current Facility-Administered Medications   Medication Dose Route Frequency    insulin NPH (NOVOLIN N, HUMULIN N) injection 10 Units  10 Units SubCUTAneous ACB&D    amLODIPine (NORVASC) tablet 10 mg  10 mg Oral DAILY    0.9% sodium chloride infusion  75 mL/hr IntraVENous CONTINUOUS    potassium chloride SR (KLOR-CON 10) tablet 20 mEq  20 mEq Oral BID    hydrALAZINE (APRESOLINE) 20 mg/mL injection 10 mg  10 mg IntraVENous Q6H PRN    ondansetron (ZOFRAN) injection 4 mg  4 mg IntraVENous Q6H PRN    aspirin chewable tablet 81 mg  81 mg Oral DAILY    clopidogreL (PLAVIX) tablet 75 mg  75 mg Oral DAILY    levothyroxine (SYNTHROID) tablet 50 mcg  50 mcg Oral ACB    montelukast (SINGULAIR) tablet 10 mg  10 mg Oral DAILY    QUEtiapine (SEROquel) tablet 100 mg  100 mg Oral BID    escitalopram oxalate (LEXAPRO) tablet 20 mg  20 mg Oral DAILY    metoprolol succinate (TOPROL-XL) XL tablet 25 mg  25 mg Oral DAILY    famotidine (PEPCID) tablet 20 mg  20 mg Oral QHS    insulin lispro (HUMALOG) injection   SubCUTAneous AC&HS    glucose chewable tablet 16 g  4 Tablet Oral PRN    dextrose (D50W) injection syrg 12.5-25 g  12.5-25 g IntraVENous PRN    glucagon (GLUCAGEN) injection 1 mg  1 mg IntraMUSCular PRN    ipratropium-albuterol (COMBIVENT RESPIMAT) 20 mcg-100 mcg inhalation spray  1 Puff Inhalation Q6H PRN    heparin (porcine) injection 5,000 Units  5,000 Units SubCUTAneous Q8H    haloperidol lactate (HALDOL) injection 1 mg  1 mg IntraMUSCular Q6H PRN    sodium chloride (NS) flush 5-10 mL  5-10 mL IntraVENous PRN    sodium chloride (NS) flush 5-40 mL  5-40 mL IntraVENous Q8H    sodium chloride (NS) flush 5-40 mL  5-40 mL IntraVENous PRN    acetaminophen (TYLENOL) tablet 650 mg  650 mg Oral Q6H PRN    Or    acetaminophen (TYLENOL) suppository 650 mg  650 mg Rectal Q6H PRN    polyethylene glycol (MIRALAX) packet 17 g  17 g Oral DAILY PRN       Care Plan discussed with: Patient/Family and Nurse    Total time spent with patient: 30 minutes.     Yosvany Freeman MD

## 2021-09-03 NOTE — PROGRESS NOTES
0400 Wallace refused AM labs. Bedside and Verbal shift change report given to KATHIA Medina (oncoming nurse) by James Mejía (offgoing nurse). Report included the following information SBAR, Kardex, Intake/Output, MAR, Recent Results and Med Rec Status.

## 2021-09-03 NOTE — PROGRESS NOTES
Bedside and Verbal shift change report given to Kimberly RN (oncoming nurse) by Jorge Gunter RN (offgoing nurse). Report included the following information SBAR, Kardex, MAR, Accordion and Recent Results.

## 2021-09-03 NOTE — PROGRESS NOTES
Comprehensive Nutrition Assessment    Type and Reason for Visit: Reassess    Nutrition Recommendations/Plan:   1. Continue current diet. 2. Consider initiation of appetite stimulant. 3. Continue Glucerna BID to aid in meeting kcal/protein needs (440 kcal, 52 g carbs, 20 g protein)   4. Consider providing meal assistance to increase intake. Nutrition Assessment:    9/3: Follow up. PO intake remains poor, with all meals and supplements. Patient very distracted at meal time with RD today. C/o feeling \"stomach sick\", refusing PO except for ginger ale. Documented Meal intake:  Patient Vitals for the past 168 hrs:   % Diet Eaten   09/03/21 0711 0%   09/02/21 1948 0%   09/02/21 1813 0%   09/02/21 1255 1 - 25%   09/02/21 0844 1 - 25%   09/02/21 0358 0%   09/01/21 2003 0%   09/01/21 0808 26 - 50%   08/31/21 2052 1 - 25%   08/31/21 1855 1 - 25%   08/31/21 1124 26 - 50%   08/31/21 0806 1 - 25%   08/30/21 0936 1 - 25%   08/28/21 1721 26 - 50%   08/28/21 1423 1 - 25%   08/28/21 1028 1 - 25%       Documentation of supplement intake:  Patient Vitals for the past 168 hrs:   Supplement intake %   09/03/21 0711 0   09/02/21 1948 0   09/02/21 1255 1 - 25%   09/02/21 0358 0   09/01/21 2003 0   08/31/21 1855 0   08/31/21 1124 0   08/31/21 0806 0   08/28/21 1423 0   08/28/21 1028 0       9/1: Pt is an 79year old female admitted with Severe sepsis ([facility] [A41.9, R65.20]. She  has a past medical history of Chronic obstructive pulmonary disease, Diabetes, Diverticulitis, DVT, History of heart artery stent, Hypertension, Hypothyroid, and Renal failure. Patient unable to provide meaningful information at time of visit. Unable to reach listed emergency contacts. Patient had not consumed lunch, showed no interest in food with RD.      Wt Readings from Last 10 Encounters:   08/31/21 73 kg (161 lb)   12/03/18 77.6 kg (171 lb)   02/09/18 77.6 kg (171 lb)   12/20/15 75 kg (165 lb 6 oz)   09/16/15 74.8 kg (165 lb)       Malnutrition Assessment:  Malnutrition Status: At risk for malnutrition (specify)    Context:  Social/environmental circumstances     Findings of the 6 clinical characteristics of malnutrition:   Energy Intake:  Unable to assess  Weight Loss:  No significant weight loass     Body Fat Loss:  1 - Mild body fat loss, Orbital, Buccal region   Muscle Mass Loss:  7 - Severe muscle mass loss, Clavicles (pectoralis &deltoids), Calf (gastrocnemius), Scapula (trapezius), Temples (temporalis)  Fluid Accumulation:  No significant fluid accumulation,     Strength:  Not performed       Estimated Daily Nutrient Needs:  Energy (kcal): 8850-9678 (25-30); Weight Used for Energy Requirements: Current  Protein (g): 73-87 (1.0-1.2); Weight Used for Protein Requirements: Current  Fluid (ml/day): 6051-9860; Method Used for Fluid Requirements: 1 ml/kcal    Nutrition Related Findings:       ABD: WNL 9/3  Last BM: 8/27  Edema: None noted 9/3    Nutr. Labs: All labs from 9/1, no updated information since  Na 145 (WNL)  K 3.4 (L)  BUN 33 (H)  Cl 116 (H)  Lab Results   Component Value Date/Time    Glucose 64 (L) 09/01/2021 05:01 AM    Glucose (POC) 148 (H) 09/02/2021 04:24 PM     Lab Results   Component Value Date/Time    Hemoglobin A1c 7.8 (H) 08/28/2021 06:06 AM       Nutr. Meds:  Norvasc  Plavix  Pepcid  Humalog  Humulin N  Synthroid  Singulair  Metoprolol Succinate  Miralax PRN    Wounds:    None noted 9/3     Current Nutrition Therapies:  ADULT ORAL NUTRITION SUPPLEMENT AM Snack, PM Snack; Diabetic Supplement  ADULT DIET Regular; 4 carb choices (60 gm/meal); No Salt Added (3-4 gm)    Anthropometric Measures:  · Height:  5' 2.01\" (157.5 cm)  · Current Body Wt:  73 kg (160 lb 15 oz)   · Ideal Body Wt:  110 lbs:  146.3 %   · BMI:  29.44  · BMI Category: Overweight (BMI 25.0-29. 9)       Nutrition Diagnosis:   · Inadequate protein-energy intake related to cognitive or neurological impairment as evidenced by intake 0-25%    Nutrition Interventions: Food and/or Nutrient Delivery: Continue current diet, Continue oral nutrition supplement  Nutrition Education and Counseling: No recommendations at this time  Coordination of Nutrition Care: Continue to monitor while inpatient, Interdisciplinary rounds    Goals:  PO intake >/= 50% estimated protein needs within 3-5 days       Nutrition Monitoring and Evaluation:   Behavioral-Environmental Outcomes: None identified  Food/Nutrient Intake Outcomes: Food and nutrient intake, Supplement intake  Physical Signs/Symptoms Outcomes: Biochemical data, Weight    Discharge Planning:     Too soon to determine     Electronically signed by Momo Ryan RD on 4/0/3370  Contact: [phone] or via [employer]el[employer]

## 2021-09-03 NOTE — PROGRESS NOTES
Problem: Mobility Impaired (Adult and Pediatric)  Goal: *Acute Goals and Plan of Care (Insert Text)  Description: FUNCTIONAL STATUS PRIOR TO ADMISSION: Patient was modified independent using a rolling walker for functional mobility. HOME SUPPORT PRIOR TO ADMISSION: The patient lived with daughter but did not require assist.    Physical Therapy Goals  Initiated 8/30/2021  1. Patient will move from supine to sit and sit to supine , scoot up and down, and roll side to side in bed with independence within 7 day(s). 2.  Patient will transfer from bed to chair and chair to bed with modified independence using the least restrictive device within 7 day(s). 3.  Patient will perform sit to stand with modified independence within 7 day(s). 4.  Patient will ambulate with modified independence for 200 feet with the least restrictive device within 7 day(s). Note:   PHYSICAL THERAPY TREATMENT  Patient: Charanjit Bishop (1 y.o. female)  Date: 9/3/2021  Diagnosis: [facility] [A41.9, R65.20] <principal problem not specified>       Precautions:    Chart, physical therapy assessment, plan of care and goals were reviewed. ASSESSMENT  Patient continues with skilled PT services. Pt is pleasant but very confused. Pt needs much redirection to stay on task. Pt min assist sit to stand. Pt ambulated 30ft with mod assist hand held. Pt is to destracted at this time to use a RW. Pt left sitting in chair with alarm in place. Progress slow. Continue goals. PLAN :  Patient continues to benefit from skilled intervention to address the above impairments. Continue treatment per established plan of care. to address goals.     Recommendation for discharge: (in order for the patient to meet his/her long term goals)  Therapy up to 5 days/week in SNF setting    This discharge recommendation:  Has been made in collaboration with the attending provider and/or case management    IF patient discharges home will need the following DME: to be determined (TBD)       SUBJECTIVE:       OBJECTIVE DATA SUMMARY:   Critical Behavior:  Neurologic State: Confused  Orientation Level: Oriented to person, Disoriented to time, Disoriented to situation, Disoriented to place  Cognition: Decreased attention/concentration, Memory loss, Poor safety awareness  Safety/Judgement: Decreased awareness of environment  Functional Mobility Training:    Transfers:  Sit to Stand: Contact guard assistance;Minimum assistance  Stand to Sit: Contact guard assistance;Minimum assistance                             Balance:  Sitting: Intact  Standing: With support  Standing - Static: Constant support  Ambulation/Gait Training:  Distance (ft): 30 Feet (ft)  Assistive Device:  (hand held)  Ambulation - Level of Assistance:  Moderate assistance                 Base of Support: Narrowed     Speed/Rosa Maria: Pace decreased (<100 feet/min)  Step Length: Right shortened;Left shortened                      Activity Tolerance:   Fair    After treatment patient left in no apparent distress:   Sitting in chair    COMMUNICATION/COLLABORATION:   The patients plan of care was discussed with: Physical therapist.     Edelmira Germain PTA   Time Calculation: 23 mins

## 2021-09-03 NOTE — ROUTINE PROCESS
Patient continues to refuse most PO. Even her daughter could not get her to eat at lunch. Daughter reports that she has not been able to get her to eat much this admission. Continue to offer diet as tolerated. No s/s aspiration. Appeared to have cognitive eating issues. \"I'm sick on my stomach\" No additional skilled SLP needed.

## 2021-09-04 LAB
GLUCOSE BLD STRIP.AUTO-MCNC: 116 MG/DL (ref 65–117)
GLUCOSE BLD STRIP.AUTO-MCNC: 134 MG/DL (ref 65–117)
SERVICE CMNT-IMP: ABNORMAL
SERVICE CMNT-IMP: NORMAL

## 2021-09-04 PROCEDURE — 65270000029 HC RM PRIVATE

## 2021-09-04 PROCEDURE — 2709999900 HC NON-CHARGEABLE SUPPLY

## 2021-09-04 PROCEDURE — 94760 N-INVAS EAR/PLS OXIMETRY 1: CPT

## 2021-09-04 PROCEDURE — 82962 GLUCOSE BLOOD TEST: CPT

## 2021-09-04 PROCEDURE — 74011250637 HC RX REV CODE- 250/637: Performed by: FAMILY MEDICINE

## 2021-09-04 RX ADMIN — QUETIAPINE FUMARATE 100 MG: 100 TABLET ORAL at 09:46

## 2021-09-04 NOTE — PROGRESS NOTES
Patient is very agitated and anxious will not allow me to stick here finger for blood sugar and heparin daughter is in the room advised we need to keep the bed alarm on for safety.

## 2021-09-04 NOTE — PROGRESS NOTES
Continues to refuse meds. Writer was only able to administer po seroquel. Refusing blood sugar check/insulin and current IV assessment. IVF on hold.

## 2021-09-04 NOTE — PROGRESS NOTES
Bedside, Verbal and Written shift change report given to Leonela Candelario (oncoming nurse) by Emogene Kocher RN (offgoing nurse). Report included the following information SBAR, Kardex, ED Summary, Procedure Summary, Intake/Output, MAR, Recent Results and Med Rec Status.

## 2021-09-04 NOTE — PROGRESS NOTES
Writer attempted a peripheral IV, patient refused, swinging at [address]. Redirection provided. Attempting to assist with po and fluid intake, only taking a few sips at a time.

## 2021-09-04 NOTE — PROGRESS NOTES
Daily Progress Note: 9/4/2021  María Das MD    Assessment/Plan:   Severe sepsis / septic shock: 2/2 PNA. -CXR showed patchy bilateral airspace disease, repeat 8/31 was improving  -Check RVP/COVID-PCR. - Negative  -Completed cefepime and Doxy  -BC neg  -sputum culture couldn't be done, PNA workup (Legionella and Strep Pneumo urine ag, Mycoplasma IgM) all negative     Chronic obstructive pulmonary disease: no wheezing.   -Combivent PRN     Hypertension:   -Restarted Norvasc and metoprolol (8/28)  -Increased Norvasc to 10 q day (9/2)     Hypothyroid:   -check TSH. Continue LT4     CAD (coronary artery disease) / History of heart artery stent:   -cont DAPT, statin.      Diabetes: type 2, associated w/ CKD4.   -not great control, A1c 7.8, tighter control would be high risk, however  -On determir at home.   -Use NPH with SSI here     CKD (chronic kidney disease) stage 4, GFR 15-29 ml/min: stable. -Follow BMP     Acute metabolic encephalopathy 2/2 sepsis.   -Has dementia and is at risk for delirium  -Avoid benzos, opioids, anticholinergics. -Verbally re-direct whenever possible.   -Avoid chemical restraints unless pt is a danger to self or others.  -Haldol as needed  -Wrist restraints as needed when family not present  -Home Seroquel restarted    Dementia  -she has had significant cognitive decline with this illness and hospitalization  -currently maintaining hydration with IV fluids, not taking in much of anything by mouth  -suspect she will do poorly in rehab  -encouraged daughter to consider hospice, can consult Palliative Care Tuesday if continuing to do poorly      Thrombocytopenia: chronic, stable.    -Follow PLT    Consider Palliative Care consult now or in the future to help family plan for the long term     Code Status: DNR     Problem List:  Problem List as of 9/4/2021 Date Reviewed: 8/26/2021        Codes Class Noted - Resolved    Thrombocytopenia ([facility] ICD-10-CM: D69.6  ICD-9-CM: 287.5  8/27/2021 - Present        Chronic obstructive pulmonary disease (HCC) ICD-10-CM: J44.9  ICD-9-CM: 991  Unknown - Present        Hypertension ICD-10-CM: I10  ICD-9-CM: 401.9  Unknown - Present        Hypothyroid ICD-10-CM: E03.9  ICD-9-CM: 244.9  Unknown - Present        CAD (coronary artery disease) ICD-10-CM: I25.10  ICD-9-CM: 414.00  Unknown - Present        History of heart artery stent ICD-10-CM: Z95.5  ICD-9-CM: V45.82  Unknown - Present        Diabetes ([facility] ICD-10-CM: E11.9  ICD-9-CM: 250.00  Unknown - Present        CKD (chronic kidney disease) stage 4, GFR 15-29 ml/min ([facility] ICD-10-CM: N18.4  ICD-9-CM: 437. 4  Unknown - Present        Severe sepsis (HCC) ICD-10-CM: A41.9, R65.20  ICD-9-CM: 038.9, 995.92  Unknown - Present        Pneumonia ICD-10-CM: J18.9  ICD-9-CM: 605  Unknown - Present        Dementia ([facility] ICD-10-CM: F03.90  ICD-9-CM: 294.20  8/26/2021 - Present        Acute metabolic encephalopathy GYH-18-GQ: G93.41  ICD-9-CM: 348.31  8/26/2021 - Present              Subjective:    79 y.o. female w/ hx of CAD, HTN, DM, CKD4, dementia presenting w/ fever, weakness. Started 1 day PTA, febrile at home, associated with AMS and lethargy per family. Pt also c/o chest pain and cough. ED workup showed 3/4 SIRS with bilateral PNA evident on CXR. Ms. Sylvia Alvarez is admitted for further evaluation and management. (Dr Ayaan Beltran)    8/27: She is confused this am. Not able to provide any history and family not present. Off pressors. BP stable. 8/28: Confused. Has been more agitated during the night requiring restraints. Change antibiotic to Doxy so that we could use Haldol. BP is higher so will restart meds. Ask case management to see.     8/29: Daughter at bedside. She is less confused this am. A little more talkative. Family willing to look at rehab as she needs 24 hour care. Discussed with daughter that she should not be left alone.   Her daughter lives out of town and works so her niece has been caring for her. 8/30:Sleeping but arouses. Nurses report she had a good night. WBC and Cr have improved. Afebrile. Case management following.     8/31: Pleasantly confused. Appetite has improved. Case management following for placement. Will continue IV antibiotics. Recheck CXR.     9/1: Family looking for SNF/Long term placement. Case management following. WBC has improved. CXR improving. Will continue IV antibiotics for now. 9/2: CM waiting to hear back from [facility] SNF/LTC for bed availability. Pt seen by PT/OT yesterday, did not tolerate well, max assist from laying to sit. Total care for ADL's. Patient on cefepime and doxy. WBC 11.9 (9/1). Patient refused labs this morning as well as her cefepime. Will try to get labs drawn this afternoon and administer cefepime if patient is more cooperative. Increased Norvasc due to HTN    9/3: More agitated and refusing blood draws. Poor appetite. IVF resumed. Family looking for placement. 9/4: Daughter at the bedside all night. Patient very confused overnight, refusing vitals and labs (only fingersticks for the past 72+ hours). Actually did really well with PT yesterday.     Review of Systems:   Review of systems not obtained due to patient factors. Objective:   Physical Exam:     Visit Vitals  BP (!) 168/74 (BP 1 Location: Left upper arm, BP Patient Position: At rest)   Pulse 77   Temp 97.3 °F (36.3 °C)   Resp 18   Ht 5' 2.01\" (1.575 m)   Wt 73 kg (161 lb)   SpO2 93%   BMI 29.44 kg/m²    O2 Flow Rate (L/min): 2 l/min O2 Device: None (Room air)  No data recorded. No intake/output data recorded. 09/02 1901 - 09/04 0700  In: 1020 [P.O.:120; I.V.:900]  Out: -   General:  Confused,  no distress, frail appearing. Head:  Normocephalic, without obvious abnormality, atraumatic. Eyes:  Conjunctivae/corneas clear   Nose: Nares normal. Septum midline. Mucosa normal. No drainage or sinus tenderness.    Throat: Lips, mucosa, and tongue moist..   Neck: Supple, symmetrical, trachea midline, no adenopathy, thyroid: no enlargement/tenderness/nodules, no carotid bruit and no JVD. Back:   Symmetric, no curvature. ROM normal. No CVA tenderness. Lungs:   Clear to auscultation bilaterally. Chest wall:  No tenderness or deformity. Heart:  Regular rate and rhythm, S1, S2 normal, no murmur, click, rub or gallop. Abdomen:   Soft, non-tender. Bowel sounds normal. No masses,  No organomegaly. Extremities: no cyanosis or edema. No calf tenderness or cords. Pulses: 2+ and symmetric all extremities. Skin: Skin color, texture, turgor normal. No rashes or lesions   Neurologic: CNII-XII intact. Alert and oriented X 1. Fine motor of hands and fingers normal.   equal.  No cogwheeling or rigidity. Gait not tested at this time. Sensation grossly normal to touch. Gross motor of extremities normal.       Data Review:    Port CXR 8/27/21   INDICATION: fever, cough  EXAM:  AP CHEST RADIOGRAPH  COMPARISON: December 4, 2018  FINDINGS:  AP portable view of the chest demonstrates right subclavian pacemaker. Heart  size is normal. Patchy airspace disease throughout the right lung and left lung  base. No pneumothorax. The osseous structures are unremarkable. IMPRESSION  Patchy bilateral airspace disease. Portable CXR 8/28/21  IMPRESSION  Improved aeration the right midlung and lower lung zones with  residual patchy airspace opacity. Small left pleural effusion. CXR 8/31/21  IMPRESSION  Improved aeration the right lung with minimal residual patchy  airspace opacity in the right midlung zone. .    Interpretation Summary- ECHO 8/31/21    · LV: Estimated LVEF is 55 - 60%. Normal cavity size and systolic function (ejection fraction normal). Upper normal wall thickness. Wall motion: normal. Mild (grade 1) left ventricular diastolic dysfunction. · Image quality for this study was technically difficult. · MV: Moderate mitral annular calcification.  Mild mitral valve regurgitation is present. · LA: Mildly dilated left atrium. · AV: Mild aortic valve stenosis is present. · RV: Pacing wire present       Recent Days:  No results for input(s): WBC, HGB, HCT, PLT, HGBEXT, HCTEXT, PLTEXT, HGBEXT, HCTEXT, PLTEXT in the last 72 hours. No results for input(s): NA, K, CL, CO2, GLU, BUN, CREA, CA, MG, PHOS, ALB, TBIL, TBILI, ALT, INR, INREXT, INREXT in the last 72 hours. No lab exists for component: SGOT  No results for input(s): PH, PCO2, PO2, HCO3, FIO2 in the last 72 hours.     24 Hour Results:  Recent Results (from the past 24 hour(s))   GLUCOSE, POC    Collection Time: 09/03/21  1:12 PM   Result Value Ref Range    Glucose (POC) 115 65 - 117 mg/dL    Performed by Luz Maria Maurice, POC    Collection Time: 09/03/21  4:35 PM   Result Value Ref Range    Glucose (POC) 108 65 - 117 mg/dL    Performed by Meg Moscoso        Medications reviewed  Current Facility-Administered Medications   Medication Dose Route Frequency    insulin NPH (NOVOLIN N, HUMULIN N) injection 10 Units  10 Units SubCUTAneous ACB&D    amLODIPine (NORVASC) tablet 10 mg  10 mg Oral DAILY    0.9% sodium chloride infusion  75 mL/hr IntraVENous CONTINUOUS    hydrALAZINE (APRESOLINE) 20 mg/mL injection 10 mg  10 mg IntraVENous Q6H PRN    ondansetron (ZOFRAN) injection 4 mg  4 mg IntraVENous Q6H PRN    aspirin chewable tablet 81 mg  81 mg Oral DAILY    clopidogreL (PLAVIX) tablet 75 mg  75 mg Oral DAILY    levothyroxine (SYNTHROID) tablet 50 mcg  50 mcg Oral ACB    montelukast (SINGULAIR) tablet 10 mg  10 mg Oral DAILY    QUEtiapine (SEROquel) tablet 100 mg  100 mg Oral BID    escitalopram oxalate (LEXAPRO) tablet 20 mg  20 mg Oral DAILY    metoprolol succinate (TOPROL-XL) XL tablet 25 mg  25 mg Oral DAILY    famotidine (PEPCID) tablet 20 mg  20 mg Oral QHS    insulin lispro (HUMALOG) injection   SubCUTAneous AC&HS    glucose chewable tablet 16 g  4 Tablet Oral PRN    dextrose (D50W) injection syrg 12.5-25 g  12.5-25 g IntraVENous PRN    glucagon (GLUCAGEN) injection 1 mg  1 mg IntraMUSCular PRN    ipratropium-albuterol (COMBIVENT RESPIMAT) 20 mcg-100 mcg inhalation spray  1 Puff Inhalation Q6H PRN    heparin (porcine) injection 5,000 Units  5,000 Units SubCUTAneous Q8H    haloperidol lactate (HALDOL) injection 1 mg  1 mg IntraMUSCular Q6H PRN    sodium chloride (NS) flush 5-10 mL  5-10 mL IntraVENous PRN    sodium chloride (NS) flush 5-40 mL  5-40 mL IntraVENous Q8H    sodium chloride (NS) flush 5-40 mL  5-40 mL IntraVENous PRN    acetaminophen (TYLENOL) tablet 650 mg  650 mg Oral Q6H PRN    Or    acetaminophen (TYLENOL) suppository 650 mg  650 mg Rectal Q6H PRN    polyethylene glycol (MIRALAX) packet 17 g  17 g Oral DAILY PRN       Care Plan discussed with: Patient/Family and Nurse    Total time spent with patient: 30 minutes.     Leeroy Vance MD

## 2021-09-04 NOTE — PROGRESS NOTES
Bedside shift change report given to Kimberly (oncoming nurse) by Benita Burkitt (offgoing nurse). Report included the following information SBAR, Kardex, MAR and Recent Results.

## 2021-09-04 NOTE — PROGRESS NOTES
Tried to reorient patient with her daughter Branden Rosales, however when I come near hear she yells NO. Tawny Rued and pushes away. She thinks her daughter is her mom very confused and disorient.  Held her 6am medication

## 2021-09-05 LAB
GLUCOSE BLD STRIP.AUTO-MCNC: 136 MG/DL (ref 65–117)
GLUCOSE BLD STRIP.AUTO-MCNC: 94 MG/DL (ref 65–117)
SERVICE CMNT-IMP: ABNORMAL
SERVICE CMNT-IMP: NORMAL

## 2021-09-05 PROCEDURE — 97530 THERAPEUTIC ACTIVITIES: CPT

## 2021-09-05 PROCEDURE — 82962 GLUCOSE BLOOD TEST: CPT

## 2021-09-05 PROCEDURE — 74011250637 HC RX REV CODE- 250/637: Performed by: FAMILY MEDICINE

## 2021-09-05 PROCEDURE — 65270000029 HC RM PRIVATE

## 2021-09-05 PROCEDURE — 94760 N-INVAS EAR/PLS OXIMETRY 1: CPT

## 2021-09-05 PROCEDURE — 74011250636 HC RX REV CODE- 250/636: Performed by: INTERNAL MEDICINE

## 2021-09-05 RX ADMIN — HEPARIN SODIUM 5000 UNITS: 5000 INJECTION INTRAVENOUS; SUBCUTANEOUS at 13:09

## 2021-09-05 NOTE — PROGRESS NOTES
Problem: Mobility Impaired (Adult and Pediatric)  Goal: *Acute Goals and Plan of Care (Insert Text)  Description: FUNCTIONAL STATUS PRIOR TO ADMISSION: Patient was modified independent using a rolling walker for functional mobility. HOME SUPPORT PRIOR TO ADMISSION: The patient lived with daughter but did not require assist.    Physical Therapy Goals  Initiated 8/30/2021  1. Patient will move from supine to sit and sit to supine , scoot up and down, and roll side to side in bed with independence within 7 day(s). 2.  Patient will transfer from bed to chair and chair to bed with modified independence using the least restrictive device within 7 day(s). 3.  Patient will perform sit to stand with modified independence within 7 day(s). 4.  Patient will ambulate with modified independence for 200 feet with the least restrictive device within 7 day(s). Outcome: Progressing Towards Goal   PHYSICAL THERAPY TREATMENT  Patient: Mahsa Serrano (40 y.o. female)  Date: 9/5/2021  Diagnosis: [facility] ([facility] [A41.9, R65.20] <principal problem not specified>       Precautions:  Fall, bed/chair alarm  Chart, physical therapy assessment, plan of care and goals were reviewed. ASSESSMENT  Patient continues with skilled PT services and is progressing towards goals. Patient alert, confused and at times not participatory however with re-direction she is able to complete bed mobility and transfer to /CGA/min A. Additional time needed for mobility. Patient takes a few steps without device to recliner chair. Able to complete LE strengthening with constant direction. Patient continues to benefit from skilled PT and will benefit from SNF at discharge. Current Level of Function Impacting Discharge (mobility/balance): CGA/Sharon    Other factors to consider for discharge:          PLAN :  Patient continues to benefit from skilled intervention to address the above impairments.   Continue treatment per established plan of care. to address goals. Recommendation for discharge: (in order for the patient to meet his/her long term goals)  Therapy up to 5 days/week in SNF setting    This discharge recommendation:  Has not yet been discussed the attending provider and/or case management    IF patient discharges home will need the following DME: to be determined (TBD)       SUBJECTIVE:   Patient stated My mother is so sweet.     OBJECTIVE DATA SUMMARY:   Critical Behavior:  Neurologic State: Confused, Agitated  Orientation Level: Unable to verbalize  Cognition: Unable to assess (comment), Decreased attention/concentration, Memory loss, Poor safety awareness  Safety/Judgement: Decreased awareness of environment  Functional Mobility Training:  Bed Mobility:     Supine to Sit: Contact guard assistance; Additional time              Transfers:  Sit to Stand: Minimum assistance; Additional time  Stand to Sit: Minimum assistance; Additional time        Bed to Chair: Additional time;Minimum assistance;Assist x1                    Balance:  Sitting: Intact  Standing: With support  Standing - Static: Constant support  Ambulation/Gait Training:                                                        Stairs:NT              Therapeutic Exercises:   Long arc quads x 5 each LE    Pain Ratin/10    Activity Tolerance:   Fair    After treatment patient left in no apparent distress:   Sitting in chair, Call bell within reach, Bed / chair alarm activated, and Caregiver / family present    COMMUNICATION/COLLABORATION:   The patients plan of care was discussed with: Registered nurse.      Armin Waldron DPT   Time Calculation: 16 mins

## 2021-09-05 NOTE — PROGRESS NOTES
Bedside, Verbal and Written shift change report given to Leonela Candelario (oncoming nurse) by Rhonda Callaway RN (offgoing nurse). Report included the following information SBAR, Kardex, ED Summary, Procedure Summary, Intake/Output, MAR and Recent Results.

## 2021-09-05 NOTE — PROGRESS NOTES
Daily Progress Note: 9/5/2021  Pili Link, Jodi Busch MD    Assessment/Plan:   Severe sepsis / septic shock: 2/2 PNA. -Resolved  -CXR showed patchy bilateral airspace disease, repeat 8/31 was improving  -Check RVP/COVID-PCR. - Negative  -Completed cefepime and Doxy  -BC neg  -sputum culture couldn't be done, PNA workup (Legionella and Strep Pneumo urine ag, Mycoplasma IgM) all negative     Chronic obstructive pulmonary disease: no wheezing.   -Combivent PRN     Hypertension:   -Restarted Norvasc and metoprolol (8/28)  -Increased Norvasc to 10 q day (9/2)  -not taking medication reliably     Hypothyroid:   -check TSH. Continue LT4     CAD (coronary artery disease) / History of heart artery stent:   -cont DAPT, statin.      Diabetes: type 2, associated w/ CKD4.   -not great control, A1c 7.8, tighter control would be high risk, however  -On determir at home.   -Use NPH with SSI here     CKD (chronic kidney disease) stage 4, GFR 15-29 ml/min: stable. -Follow BMP     Acute metabolic encephalopathy 2/2 sepsis.   -Has dementia and is at risk for delirium  -Avoid benzos, opioids, anticholinergics. -Verbally re-direct whenever possible.   -Avoid chemical restraints unless pt is a danger to self or others.  -Haldol as needed  -Wrist restraints as needed when family not present  -Home Seroquel restarted    Dementia  -she has had significant cognitive decline with this illness and hospitalization  -currently maintaining hydration with IV fluids, not taking in much of anything by mouth  -suspect she will do poorly in rehab  -encouraged daughter to consider hospice, will consult Palliative Care Tuesday  -home with hospice is an option  -no longer on IV fluids, not taking much in by mouth, likely will not do well at rehab      Thrombocytopenia: chronic, stable.    -Follow PLT    Consider Palliative Care consult now or in the future to help family plan for the long term     Code Status: DNR     Problem List:  Problem List as of 9/5/2021 Date Reviewed: 8/26/2021        Codes Class Noted - Resolved    Thrombocytopenia ([facility] ICD-10-CM: D69.6  ICD-9-CM: 287.5  8/27/2021 - Present        Chronic obstructive pulmonary disease (HCC) ICD-10-CM: J44.9  ICD-9-CM: 80  Unknown - Present        Hypertension ICD-10-CM: I10  ICD-9-CM: 401.9  Unknown - Present        Hypothyroid ICD-10-CM: E03.9  ICD-9-CM: 244.9  Unknown - Present        CAD (coronary artery disease) ICD-10-CM: I25.10  ICD-9-CM: 414.00  Unknown - Present        History of heart artery stent ICD-10-CM: Z95.5  ICD-9-CM: V45.82  Unknown - Present        Diabetes ([facility] ICD-10-CM: E11.9  ICD-9-CM: 250.00  Unknown - Present        CKD (chronic kidney disease) stage 4, GFR 15-29 ml/min ([facility] ICD-10-CM: N18.4  ICD-9-CM: 014. 4  Unknown - Present        Severe sepsis (HCC) ICD-10-CM: A41.9, R65.20  ICD-9-CM: 038.9, 995.92  Unknown - Present        Pneumonia ICD-10-CM: J18.9  ICD-9-CM: 529  Unknown - Present        Dementia ([facility] ICD-10-CM: F03.90  ICD-9-CM: 294.20  8/26/2021 - Present        Acute metabolic encephalopathy VWL-53-MT: G93.41  ICD-9-CM: 348.31  8/26/2021 - Present              Subjective:    79 y.o. female w/ hx of CAD, HTN, DM, CKD4, dementia presenting w/ fever, weakness. Started 1 day PTA, febrile at home, associated with AMS and lethargy per family. Pt also c/o chest pain and cough. ED workup showed 3/4 SIRS with bilateral PNA evident on CXR. Ms. Getachew Robins is admitted for further evaluation and management. (Dr Norma Ambriz)    8/27: She is confused this am. Not able to provide any history and family not present. Off pressors. BP stable. 8/28: Confused. Has been more agitated during the night requiring restraints. Change antibiotic to Doxy so that we could use Haldol. BP is higher so will restart meds. Ask case management to see.     8/29: Daughter at bedside. She is less confused this am. A little more talkative.  Family willing to look at rehab as she needs 24 hour care. Discussed with daughter that she should not be left alone. Her daughter lives out of town and works so her niece has been caring for her. 8/30:Sleeping but arouses. Nurses report she had a good night. WBC and Cr have improved. Afebrile. Case management following.     8/31: Pleasantly confused. Appetite has improved. Case management following for placement. Will continue IV antibiotics. Recheck CXR.     9/1: Family looking for SNF/Long term placement. Case management following. WBC has improved. CXR improving. Will continue IV antibiotics for now. 9/2: CM waiting to hear back from [facility] SNF/LTC for bed availability. Pt seen by PT/OT yesterday, did not tolerate well, max assist from laying to sit. Total care for ADL's. Patient on cefepime and doxy. WBC 11.9 (9/1). Patient refused labs this morning as well as her cefepime. Will try to get labs drawn this afternoon and administer cefepime if patient is more cooperative. Increased Norvasc due to HTN    9/3: More agitated and refusing blood draws. Poor appetite. IVF resumed. Family looking for placement. 9/4: Daughter at the bedside all night. Patient very confused overnight, refusing vitals and labs (only fingersticks for the past 72+ hours). Actually did really well with PT yesterday. 9/5: Lost IV acces overnight, not taking in much of anything by mouth, suspect she will continue to decline, will plan to consult Palliative on Tuesday. Daughter at bedside and agrees with this plan, she seems reasonable about her mother's decline; home with hospice is a definite option given her overall decline.     Review of Systems:   Review of systems not obtained due to patient factors.     Objective:   Physical Exam:     Visit Vitals  BP (!) 162/79 (BP 1 Location: Left upper arm, BP Patient Position: At rest)   Pulse 64   Temp 97.6 °F (36.4 °C)   Resp 17   Ht 5' 2.01\" (1.575 m)   Wt 64.3 kg (141 lb 12.1 oz)   SpO2 93%   BMI 25.92 kg/m²    O2 Flow Rate (L/min): 2 l/min O2 Device: None (Room air)  Temp (24hrs), Av.1 °F (36.2 °C), Min:96.5 °F (35.8 °C), Max:97.6 °F (36.4 °C)    No intake/output data recorded. No intake/output data recorded. General:  Confused,  no distress, frail appearing. Head:  Normocephalic, without obvious abnormality, atraumatic. Eyes:  Conjunctivae/corneas clear   Nose: Nares normal. Septum midline. Mucosa normal. No drainage or sinus tenderness. Throat: Lips, mucosa, and tongue moist..   Neck: Supple, symmetrical, trachea midline, no adenopathy, thyroid: no enlargement/tenderness/nodules, no carotid bruit and no JVD. Back:   Symmetric, no curvature. ROM normal. No CVA tenderness. Lungs:   Clear to auscultation bilaterally. Chest wall:  No tenderness or deformity. Heart:  Regular rate and rhythm, S1, S2 normal, no murmur, click, rub or gallop. Abdomen:   Soft, non-tender. Bowel sounds normal. No masses,  No organomegaly. Extremities: no cyanosis or edema. No calf tenderness or cords. Pulses: 2+ and symmetric all extremities. Skin: Skin color, texture, turgor normal. No rashes or lesions   Neurologic: CNII-XII intact. Alert and oriented X 1. Fine motor of hands and fingers normal.   equal.  No cogwheeling or rigidity. Gait not tested at this time. Sensation grossly normal to touch. Gross motor of extremities normal.       Data Review:    Port CXR 21   INDICATION: fever, cough  EXAM:  AP CHEST RADIOGRAPH  COMPARISON: 2018  FINDINGS:  AP portable view of the chest demonstrates right subclavian pacemaker. Heart  size is normal. Patchy airspace disease throughout the right lung and left lung  base. No pneumothorax. The osseous structures are unremarkable. IMPRESSION  Patchy bilateral airspace disease. Portable CXR 21  IMPRESSION  Improved aeration the right midlung and lower lung zones with  residual patchy airspace opacity. Small left pleural effusion.     CXR 8/31/21  IMPRESSION  Improved aeration the right lung with minimal residual patchy  airspace opacity in the right midlung zone. .    Interpretation Summary- ECHO 8/31/21    · LV: Estimated LVEF is 55 - 60%. Normal cavity size and systolic function (ejection fraction normal). Upper normal wall thickness. Wall motion: normal. Mild (grade 1) left ventricular diastolic dysfunction. · Image quality for this study was technically difficult. · MV: Moderate mitral annular calcification. Mild mitral valve regurgitation is present. · LA: Mildly dilated left atrium. · AV: Mild aortic valve stenosis is present. · RV: Pacing wire present       Recent Days:  No results for input(s): WBC, HGB, HCT, PLT, HGBEXT, HCTEXT, PLTEXT, HGBEXT, HCTEXT, PLTEXT in the last 72 hours. No results for input(s): NA, K, CL, CO2, GLU, BUN, CREA, CA, MG, PHOS, ALB, TBIL, TBILI, ALT, INR, INREXT, INREXT in the last 72 hours. No lab exists for component: SGOT  No results for input(s): PH, PCO2, PO2, HCO3, FIO2 in the last 72 hours.     24 Hour Results:  Recent Results (from the past 24 hour(s))   GLUCOSE, POC    Collection Time: 09/04/21 12:34 PM   Result Value Ref Range    Glucose (POC) 134 (H) 65 - 117 mg/dL    Performed by Oleg Hodges    GLUCOSE, POC    Collection Time: 09/04/21  4:14 PM   Result Value Ref Range    Glucose (POC) 116 65 - 117 mg/dL    Performed by Oleg Hodges        Medications reviewed  Current Facility-Administered Medications   Medication Dose Route Frequency    insulin NPH (NOVOLIN N, HUMULIN N) injection 10 Units  10 Units SubCUTAneous ACB&D    amLODIPine (NORVASC) tablet 10 mg  10 mg Oral DAILY    0.9% sodium chloride infusion  75 mL/hr IntraVENous CONTINUOUS    hydrALAZINE (APRESOLINE) 20 mg/mL injection 10 mg  10 mg IntraVENous Q6H PRN    ondansetron (ZOFRAN) injection 4 mg  4 mg IntraVENous Q6H PRN    aspirin chewable tablet 81 mg  81 mg Oral DAILY    clopidogreL (PLAVIX) tablet 75 mg  75 mg Oral DAILY  levothyroxine (SYNTHROID) tablet 50 mcg  50 mcg Oral ACB    montelukast (SINGULAIR) tablet 10 mg  10 mg Oral DAILY    QUEtiapine (SEROquel) tablet 100 mg  100 mg Oral BID    escitalopram oxalate (LEXAPRO) tablet 20 mg  20 mg Oral DAILY    metoprolol succinate (TOPROL-XL) XL tablet 25 mg  25 mg Oral DAILY    famotidine (PEPCID) tablet 20 mg  20 mg Oral QHS    insulin lispro (HUMALOG) injection   SubCUTAneous AC&HS    glucose chewable tablet 16 g  4 Tablet Oral PRN    dextrose (D50W) injection syrg 12.5-25 g  12.5-25 g IntraVENous PRN    glucagon (GLUCAGEN) injection 1 mg  1 mg IntraMUSCular PRN    ipratropium-albuterol (COMBIVENT RESPIMAT) 20 mcg-100 mcg inhalation spray  1 Puff Inhalation Q6H PRN    heparin (porcine) injection 5,000 Units  5,000 Units SubCUTAneous Q8H    haloperidol lactate (HALDOL) injection 1 mg  1 mg IntraMUSCular Q6H PRN    sodium chloride (NS) flush 5-10 mL  5-10 mL IntraVENous PRN    sodium chloride (NS) flush 5-40 mL  5-40 mL IntraVENous Q8H    sodium chloride (NS) flush 5-40 mL  5-40 mL IntraVENous PRN    acetaminophen (TYLENOL) tablet 650 mg  650 mg Oral Q6H PRN    Or    acetaminophen (TYLENOL) suppository 650 mg  650 mg Rectal Q6H PRN    polyethylene glycol (MIRALAX) packet 17 g  17 g Oral DAILY PRN       Care Plan discussed with: Patient/Family and Nurse    Total time spent with patient: 40 minutes.     Tara Blake MD

## 2021-09-05 NOTE — PROGRESS NOTES
Continues to refuse meds, accuchecks at times and comfort interventions such as oral care.  Accepted a few sips of ginger ale this am.

## 2021-09-06 PROCEDURE — 65270000029 HC RM PRIVATE

## 2021-09-06 PROCEDURE — 74011250637 HC RX REV CODE- 250/637: Performed by: INTERNAL MEDICINE

## 2021-09-06 PROCEDURE — 99223 1ST HOSP IP/OBS HIGH 75: CPT | Performed by: INTERNAL MEDICINE

## 2021-09-06 PROCEDURE — 94760 N-INVAS EAR/PLS OXIMETRY 1: CPT

## 2021-09-06 RX ORDER — QUETIAPINE FUMARATE 100 MG/1
50 TABLET, FILM COATED ORAL 2 TIMES DAILY
Status: DISCONTINUED | OUTPATIENT
Start: 2021-09-06 | End: 2021-09-11 | Stop reason: HOSPADM

## 2021-09-06 RX ADMIN — QUETIAPINE FUMARATE 50 MG: 100 TABLET ORAL at 17:40

## 2021-09-06 NOTE — PROGRESS NOTES
Bedside and Verbal shift change report given to Ru Boo RN (oncoming nurse) by Marin Lancaster RN (offgoing nurse). Report included the following information SBAR, Kardex, Intake/Output, MAR and Recent Results.

## 2021-09-06 NOTE — PROGRESS NOTES
Bedside shift change report given to Kimberly (oncoming nurse) by [id number] (offgoing nurse). Report included the following information SBAR, Kardex, MAR and Recent Results.

## 2021-09-06 NOTE — PROGRESS NOTES
Bedside, Verbal and Written shift change report given to [address] (oncoming nurse) by Gordy Willingham RN (offgoing nurse). Report included the following information SBAR, Kardex, ED Summary, OR Summary, Procedure Summary, Intake/Output, MAR, Recent Results and Med Rec Status.

## 2021-09-06 NOTE — PROGRESS NOTES
Daily Progress Note: 9/6/2021  Sandi Link, Raimundo Bhatia MD    Assessment/Plan:   Severe sepsis / septic shock: 2/2 PNA. -Resolved  -CXR showed patchy bilateral airspace disease, repeat 8/31 was improving  -Check RVP/COVID-PCR. - Negative  -Completed cefepime and Doxy  -BC neg  -sputum culture couldn't be done, PNA workup (Legionella and Strep Pneumo urine ag, Mycoplasma IgM) all negative     Chronic obstructive pulmonary disease: no wheezing.   -Combivent PRN     Hypertension:   -Restarted Norvasc and metoprolol (8/28)  -Increased Norvasc to 10 q day (9/2)  -not taking medication reliably, BP running up     Hypothyroid:   -check TSH. Continue LT4     CAD (coronary artery disease) / History of heart artery stent:   -cont DAPT, statin.      Diabetes: type 2, associated w/ CKD4.   -not great control, A1c 7.8, tighter control would be high risk, however  -On determir at home.   -Use NPH with SSI here     CKD (chronic kidney disease) stage 4, GFR 15-29 ml/min: stable. -Follow BMP     Acute metabolic encephalopathy 2/2 sepsis.   -Has dementia and is at risk for delirium  -Avoid benzos, opioids, anticholinergics. -Verbally re-direct whenever possible.   -Avoid chemical restraints unless pt is a danger to self or others.  -Haldol as needed  -Wrist restraints as needed when family not present  -Home Seroquel restarted    Dementia  -she has had significant cognitive decline with this illness and hospitalization  -suspect she would do poorly in rehab, family aware  -encouraged daughter to consider hospice, will consult Palliative Care Tuesday  -home with hospice is an option  -no longer on IV fluids, not taking much in by mouth       Thrombocytopenia: chronic, stable.    -Follow PLT    Consult Palliative Care consult along with hospice in AM     Code Status: DNR     Problem List:  Problem List as of 9/6/2021 Date Reviewed: 8/26/2021        Codes Class Noted - Resolved    Thrombocytopenia ([facility] ICD-10-CM: D69.6  ICD-9-CM: 287.5  8/27/2021 - Present        Chronic obstructive pulmonary disease (HCC) ICD-10-CM: J44.9  ICD-9-CM: 80  Unknown - Present        Hypertension ICD-10-CM: I10  ICD-9-CM: 401.9  Unknown - Present        Hypothyroid ICD-10-CM: E03.9  ICD-9-CM: 244.9  Unknown - Present        CAD (coronary artery disease) ICD-10-CM: I25.10  ICD-9-CM: 414.00  Unknown - Present        History of heart artery stent ICD-10-CM: Z95.5  ICD-9-CM: V45.82  Unknown - Present        Diabetes ([facility] ICD-10-CM: E11.9  ICD-9-CM: 250.00  Unknown - Present        CKD (chronic kidney disease) stage 4, GFR 15-29 ml/min ([facility] ICD-10-CM: N18.4  ICD-9-CM: 024. 4  Unknown - Present        Severe sepsis (HCC) ICD-10-CM: A41.9, R65.20  ICD-9-CM: 038.9, 995.92  Unknown - Present        Pneumonia ICD-10-CM: J18.9  ICD-9-CM: 231  Unknown - Present        Dementia ([facility] ICD-10-CM: F03.90  ICD-9-CM: 294.20  8/26/2021 - Present        Acute metabolic encephalopathy OXX-62-BY: G93.41  ICD-9-CM: 348.31  8/26/2021 - Present              Subjective:    79 y.o. female w/ hx of CAD, HTN, DM, CKD4, dementia presenting w/ fever, weakness. Started 1 day PTA, febrile at home, associated with AMS and lethargy per family. Pt also c/o chest pain and cough. ED workup showed 3/4 SIRS with bilateral PNA evident on CXR. Ms. Oneta Hammans is admitted for further evaluation and management. (Dr Ivette Rios)    8/27: She is confused this am. Not able to provide any history and family not present. Off pressors. BP stable. 8/28: Confused. Has been more agitated during the night requiring restraints. Change antibiotic to Doxy so that we could use Haldol. BP is higher so will restart meds. Ask case management to see.     8/29: Daughter at bedside. She is less confused this am. A little more talkative. Family willing to look at rehab as she needs 24 hour care. Discussed with daughter that she should not be left alone.   Her daughter lives out of town and works so her niece has been caring for her. 8/30:Sleeping but arouses. Nurses report she had a good night. WBC and Cr have improved. Afebrile. Case management following.     8/31: Pleasantly confused. Appetite has improved. Case management following for placement. Will continue IV antibiotics. Recheck CXR.     9/1: Family looking for SNF/Long term placement. Case management following. WBC has improved. CXR improving. Will continue IV antibiotics for now. 9/2: CM waiting to hear back from [facility] SNF/LTC for bed availability. Pt seen by PT/OT yesterday, did not tolerate well, max assist from laying to sit. Total care for ADL's. Patient on cefepime and doxy. WBC 11.9 (9/1). Patient refused labs this morning as well as her cefepime. Will try to get labs drawn this afternoon and administer cefepime if patient is more cooperative. Increased Norvasc due to HTN    9/3: More agitated and refusing blood draws. Poor appetite. IVF resumed. Family looking for placement. 9/4: Daughter at the bedside all night. Patient very confused overnight, refusing vitals and labs (only fingersticks for the past 72+ hours). Actually did really well with PT yesterday. 9/5: Lost IV acces overnight, not taking in much of anything by mouth, suspect she will continue to decline, will plan to consult Palliative on Tuesday. Daughter at bedside and agrees with this plan, she seems reasonable about her mother's decline; home with hospice is a definite option given her overall decline. 9/6: Continues to take in minimal PO, did take some ginger ale overnight. Does not have an IV. Patient is minimally verbal, family not at the bedside this AM. She denies any complaints.     Review of Systems:   Review of systems not obtained due to patient factors.     Objective:   Physical Exam:     Visit Vitals  BP (!) 169/78 (BP 1 Location: Left upper arm, BP Patient Position: At rest)   Pulse 80   Temp 97.5 °F (36.4 °C)   Resp 18   Ht 5' 2.01\" (1.575 m)   Wt 65 kg (143 lb 4.8 oz)   SpO2 100%   BMI 26.20 kg/m²    O2 Flow Rate (L/min): 2 l/min O2 Device: None (Room air)  Temp (24hrs), Av.5 °F (36.4 °C), Min:97.5 °F (36.4 °C), Max:97.5 °F (36.4 °C)    No intake/output data recorded. No intake/output data recorded. General:  Confused,  no distress, frail appearing. Head:  Normocephalic, without obvious abnormality, atraumatic. Eyes:  Conjunctivae/corneas clear   Nose: Nares normal. Septum midline. Mucosa normal. No drainage or sinus tenderness. Throat: Lips, mucosa, and tongue moist..   Neck: Supple, symmetrical, trachea midline, no adenopathy, thyroid: no enlargement/tenderness/nodules, no carotid bruit and no JVD. Back:   Symmetric, no curvature. ROM normal. No CVA tenderness. Lungs:   Clear to auscultation bilaterally. Chest wall:  No tenderness or deformity. Heart:  Regular rate and rhythm, S1, S2 normal, no murmur, click, rub or gallop. Abdomen:   Soft, non-tender. Bowel sounds normal. No masses,  No organomegaly. Extremities: no cyanosis or edema. No calf tenderness or cords. Pulses: 2+ and symmetric all extremities. Skin: Skin color, texture, turgor normal. No rashes or lesions   Neurologic: CNII-XII intact. Alert and oriented X 1. Fine motor of hands and fingers normal.   equal.  No cogwheeling or rigidity. Gait not tested at this time. Sensation grossly normal to touch. Gross motor of extremities normal.       Data Review:    Port CXR 21   INDICATION: fever, cough  EXAM:  AP CHEST RADIOGRAPH  COMPARISON: 2018  FINDINGS:  AP portable view of the chest demonstrates right subclavian pacemaker. Heart  size is normal. Patchy airspace disease throughout the right lung and left lung  base. No pneumothorax. The osseous structures are unremarkable. IMPRESSION  Patchy bilateral airspace disease.     Portable CXR 21  IMPRESSION  Improved aeration the right midlung and lower lung zones with  residual patchy airspace opacity. Small left pleural effusion. CXR 8/31/21  IMPRESSION  Improved aeration the right lung with minimal residual patchy  airspace opacity in the right midlung zone. .    Interpretation Summary- ECHO 8/31/21    · LV: Estimated LVEF is 55 - 60%. Normal cavity size and systolic function (ejection fraction normal). Upper normal wall thickness. Wall motion: normal. Mild (grade 1) left ventricular diastolic dysfunction. · Image quality for this study was technically difficult. · MV: Moderate mitral annular calcification. Mild mitral valve regurgitation is present. · LA: Mildly dilated left atrium. · AV: Mild aortic valve stenosis is present. · RV: Pacing wire present       Recent Days:  No results for input(s): WBC, HGB, HCT, PLT, HGBEXT, HCTEXT, PLTEXT, HGBEXT, HCTEXT, PLTEXT in the last 72 hours. No results for input(s): NA, K, CL, CO2, GLU, BUN, CREA, CA, MG, PHOS, ALB, TBIL, TBILI, ALT, INR, INREXT, INREXT in the last 72 hours. No lab exists for component: SGOT  No results for input(s): PH, PCO2, PO2, HCO3, FIO2 in the last 72 hours.     24 Hour Results:  Recent Results (from the past 24 hour(s))   GLUCOSE, POC    Collection Time: 09/05/21 11:51 AM   Result Value Ref Range    Glucose (POC) 136 (H) 65 - 117 mg/dL    Performed by Yosvany Enamorado    GLUCOSE, POC    Collection Time: 09/05/21  9:04 PM   Result Value Ref Range    Glucose (POC) 94 65 - 117 mg/dL    Performed by Concepcion Riggs (PCT)        Medications reviewed  Current Facility-Administered Medications   Medication Dose Route Frequency    insulin NPH (NOVOLIN N, HUMULIN N) injection 10 Units  10 Units SubCUTAneous ACB&D    amLODIPine (NORVASC) tablet 10 mg  10 mg Oral DAILY    0.9% sodium chloride infusion  75 mL/hr IntraVENous CONTINUOUS    hydrALAZINE (APRESOLINE) 20 mg/mL injection 10 mg  10 mg IntraVENous Q6H PRN    ondansetron (ZOFRAN) injection 4 mg  4 mg IntraVENous Q6H PRN    aspirin chewable tablet 81 mg  81 mg Oral DAILY    clopidogreL (PLAVIX) tablet 75 mg  75 mg Oral DAILY    levothyroxine (SYNTHROID) tablet 50 mcg  50 mcg Oral ACB    montelukast (SINGULAIR) tablet 10 mg  10 mg Oral DAILY    QUEtiapine (SEROquel) tablet 100 mg  100 mg Oral BID    escitalopram oxalate (LEXAPRO) tablet 20 mg  20 mg Oral DAILY    metoprolol succinate (TOPROL-XL) XL tablet 25 mg  25 mg Oral DAILY    famotidine (PEPCID) tablet 20 mg  20 mg Oral QHS    insulin lispro (HUMALOG) injection   SubCUTAneous AC&HS    glucose chewable tablet 16 g  4 Tablet Oral PRN    dextrose (D50W) injection syrg 12.5-25 g  12.5-25 g IntraVENous PRN    glucagon (GLUCAGEN) injection 1 mg  1 mg IntraMUSCular PRN    ipratropium-albuterol (COMBIVENT RESPIMAT) 20 mcg-100 mcg inhalation spray  1 Puff Inhalation Q6H PRN    heparin (porcine) injection 5,000 Units  5,000 Units SubCUTAneous Q8H    haloperidol lactate (HALDOL) injection 1 mg  1 mg IntraMUSCular Q6H PRN    sodium chloride (NS) flush 5-10 mL  5-10 mL IntraVENous PRN    sodium chloride (NS) flush 5-40 mL  5-40 mL IntraVENous Q8H    sodium chloride (NS) flush 5-40 mL  5-40 mL IntraVENous PRN    acetaminophen (TYLENOL) tablet 650 mg  650 mg Oral Q6H PRN    Or    acetaminophen (TYLENOL) suppository 650 mg  650 mg Rectal Q6H PRN    polyethylene glycol (MIRALAX) packet 17 g  17 g Oral DAILY PRN       Care Plan discussed with: Patient/Family and Nurse    Total time spent with patient: 30 minutes.     Amita Gonzales MD

## 2021-09-06 NOTE — CONSULTS
Palliative Medicine Consult  Johnie: [phone] ([phone])    Patient Name: Shira Carlos  YOB: 1940    Date of Initial Consult: 9/6/21  Reason for Consult: Care decisions  Requesting Provider: Dr. Zuhair Nazario  Primary Care Physician: Stuart Cooper MD     SUMMARY:   Shira Carlos is a 79 y.o. lady with a medical history of CVA, dementia, COPD CKD4 and CAD who was admitted on 8/26/2021 to [facility] with a diagnosis of severe sepsis secondary to PNA. Admission CXR showed patchy bilateral airspace disease. Current medical issues leading to Palliative Medicine involvement include: progressive dementia. PTA patient has been residing at home with her niece and niece' daughter. She remained ambulatory but confused at baseline, often did not recognize family. She was able to eat and drink and in fact food would often calm her down when agitated. She was on Seroquel 100 mg BID at home due to agitation. She had no reported dysphagia and would accept all pills at home prior to this admission. Psychosocial Hx- . She has one daughter. She has been cared for at home by her niece and Festus Stark and [address] daughter [address] [place] [address] for the past 7 yrs. She does have a daughter who is not involved in decision making or care, but does visit. PALLIATIVE DIAGNOSES:   1. Goals of care discussion   2. Advanced dementia, suspect vascular type  3. Severe sepsis- resolved  4. Pneumonia- resolved, on RA, completed IV abx course in house       PLAN:   1. Pt seen on 5th floor, no family at bedside. She is calm and smiling, in no distress. 2. Review of EMR reveals she has not taken any pills by mouth for at least 3 days and no longer has an IV. She was also refusing lab draws repeatedly. She has however already completed a course of IV abx and is not requiring any acute care, she is hemodynamically stable.    3. Last CM note on 9/3 indicates family is seeking placement for her but as SNF?  LTC? Heather Chisholm An information session was already conducted on day 1 of this hospital stay with [facility]. 4. I spoke to [employer] by phone who confirmed that she and her daughter can no longer care for patient at home. They are caring for their mother (patient's sister) and have several other family members they are supporting. Jennifer has a very good understanding about the nature of dementia and states that her Sergio Rhonda has dramatically declined this admission, as she was previously eating, drinking and accepting her pills. 5. We talked about the plan and Jennifer does remain hopeful her Aunt can receive some rehab at the [address] [place] [address]. She just received a call from the [address] [place] [address] stating they have a bed for her, I assume for SNF/rehab placement ? Ivon wasn't sure but does not have resources for LTC at this juncture. 6. Talked about Hospice support as likely next step if ongoing refusal of PO is demonstrated. Jennifer understands this and will accept Hospice support for her aunt as she wants her to die comfortably when the time comes. This of course will need to be done at a facility, not at home. 7. Reviewed medications- I recommend cessation of Lexapro, the sc heparin, IVF order, insulin and diagnostics as patient has not been accepting and has no IV. Her blood glucose checks are all < 200. Doesn't make sense to take Lexapro sporadically. Will leave Norvasc, ASA and Plavix orders because we are currently in pursuit of rehab, not comfort focused care in a long term care bed. Family wants to try offering these meds to her later today. I did lower her Seroquel dose to 50 mg BID as again, she is not taking on a consistent basis. 8. Ideally would complete a POST form with family in her case, but this will be challenging as Jennifer is not at bedside today and may not be soon.   I did talk to her about completing a DDNR, will leave a copy in her chart for her to sign when she is available. This is more straightforward and we reviewed it on the phone, compared to a POST form. 9. Code status- DNR, as above, will have Matthias Perez sign the [facility] when she is in.    10. Thank you for the opportunity to participate in the care of [facility] [address]  11. Communicated plan of care with: Palliative IDT, [address] Team.  Please call me with questions. GOALS OF CARE / TREATMENT PREFERENCES:     GOALS OF CARE:  Patient/Health Care Proxy Stated Goals: Comfort    TREATMENT PREFERENCES:   Code Status: DNR    Patient's personal goals include: n/a- mPOA/naldo reports goal is for her to be comfortable, respect her wishes of refusal of interventions    Important upcoming milestones or family events: none reported    The patient identifies the following as important for living well: n/a      Advance Care Planning:  [x] The [facility] Interdisciplinary Team has updated the ACP Navigator with [address] and Patient Capacity      Primary Decision Maker (Active): Meenakshi Whitfield (Naldo, Irene) - [phone]     Advance Care Planning 8/31/2021   Confirm Advance Directive Yes, not on file       Medical Interventions: Limited additional interventions       Other:    As far as possible, the palliative care team has discussed with patient / health care proxy about goals of care / treatment preferences for patient. HISTORY:     History obtained from: naldo Edgarroger Andrew: n/a    HPI/SUBJECTIVE:    The patient is:   [] Verbal and participatory  [x] Non-participatory due to:  Dementia    Pt is alert and communicative, but very confused. She appears calm and comfortable. States she is \"ok\" and denies any discomfort. She is unable to provide any history.        Clinical Pain Assessment (nonverbal scale for severity on nonverbal patients):   Clinical Pain Assessment  Severity: 0     Activity (Movement): Lying quietly, normal position    Duration: for how long has pt been experiencing pain (e.g., 2 days, 1 month, years)  Frequency: how often pain is an issue (e.g., several times per day, once every few days, constant)     FUNCTIONAL ASSESSMENT:     Palliative Performance Scale (PPS):  PPS: 20       PSYCHOSOCIAL/SPIRITUAL SCREENING:     Palliative IDT has assessed this patient for cultural preferences / practices and a referral made as appropriate to needs (Cultural Services, Patient Advocacy, Ethics, etc.)    Any spiritual / Jain concerns:  [] Yes /  [x] No    Caregiver Burnout:  [] Yes /  [] No /  [x] No Caregiver Present      Anticipatory grief assessment:   [x] Normal  / [] Maladaptive       ESAS Anxiety: Anxiety: 0    ESAS Depression:          REVIEW OF SYSTEMS:     Positive and pertinent negative findings in ROS are noted above in HPI. The following systems were [x] reviewed / [] unable to be reviewed as noted in HPI  Other findings are noted below. Systems: constitutional, ears/nose/mouth/throat, respiratory, gastrointestinal, genitourinary, musculoskeletal, integumentary, neurologic, psychiatric, endocrine. Positive findings noted below. Modified ESAS Completed by: provider           Pain: 0   Anxiety: 0 Nausea: 0   Anorexia: 10 Dyspnea: 0           Stool Occurrence(s): 0        PHYSICAL EXAM:     From RN flowsheet:  Wt Readings from Last 3 Encounters:   09/05/21 143 lb 4.8 oz (65 kg)   12/03/18 171 lb (77.6 kg)   02/09/18 171 lb (77.6 kg)     Blood pressure (!) 153/77, pulse (!) 104, temperature 97.5 °F (36.4 °C), resp. rate 18, height 5' 2.01\" (1.575 m), weight 143 lb 4.8 oz (65 kg), SpO2 97 %. Pain Scale 1: Numeric (0 - 10)  Pain Intensity 1: 0                 Last bowel movement, if known:     Frail elderly woman lying in bed; appears pleasant and comfortable, NAD. Listening to care channel. Sclerae anicteric  CVS:  Reg tachycardia  Respirations unlabored on RA.    Abdomen:  Soft, NTND, soft BS  Alert but not oriented at all, confused, unable to provide ROS, very limited command following. No rigidity or tremor. Calm, not agitated. HISTORY:     Active Problems:    Chronic obstructive pulmonary disease (HCC) ()      Hypertension ()      Hypothyroid ()      CAD (coronary artery disease) ()      History of heart artery stent ()      Diabetes (HCC) ()      CKD (chronic kidney disease) stage 4, GFR 15-29 ml/min (HCC) ()      Severe sepsis (HCC) ()      Pneumonia ()      Dementia ([facility] (8/26/2021)      Acute metabolic encephalopathy (6/17/0463)      Thrombocytopenia ([facility] (8/27/2021)      Past Medical History:   Diagnosis Date    Chronic obstructive pulmonary disease (HCC)     Diabetes ([facility]     Diverticulitis     D[facility]deep venous thrombosis) [facility])     History of heart artery stent     Hypertension     Hypothyroid     Renal failure       Past Surgical History:   Procedure Laterality Date    HX APPENDECTOMY      HX CYST REMOVAL      HX HYSTERECTOMY      HX IMPLANTABLE CARDIOVERTER DEFIBRILLATOR      HX PACEMAKER        History reviewed. No pertinent family history. History reviewed, no pertinent family history.   Social History     Tobacco Use    Smoking status: Never Smoker    Smokeless tobacco: Never Used   Substance Use Topics    Alcohol use: No     Allergies   Allergen Reactions    Codeine Nausea and Vomiting      Current Facility-Administered Medications   Medication Dose Route Frequency    QUEtiapine (SEROquel) tablet 50 mg  50 mg Oral BID    amLODIPine (NORVASC) tablet 10 mg  10 mg Oral DAILY    hydrALAZINE (APRESOLINE) 20 mg/mL injection 10 mg  10 mg IntraVENous Q6H PRN    ondansetron (ZOFRAN) injection 4 mg  4 mg IntraVENous Q6H PRN    aspirin chewable tablet 81 mg  81 mg Oral DAILY    clopidogreL (PLAVIX) tablet 75 mg  75 mg Oral DAILY    metoprolol succinate (TOPROL-XL) XL tablet 25 mg  25 mg Oral DAILY    famotidine (PEPCID) tablet 20 mg  20 mg Oral QHS    ipratropium-albuterol (COMBIVENT RESPIMAT) 20 mcg-100 mcg inhalation spray  1 Puff Inhalation Q6H PRN    haloperidol lactate (HALDOL) injection 1 mg  1 mg IntraMUSCular Q6H PRN    sodium chloride (NS) flush 5-10 mL  5-10 mL IntraVENous PRN    sodium chloride (NS) flush 5-40 mL  5-40 mL IntraVENous Q8H    sodium chloride (NS) flush 5-40 mL  5-40 mL IntraVENous PRN    acetaminophen (TYLENOL) tablet 650 mg  650 mg Oral Q6H PRN    Or    acetaminophen (TYLENOL) suppository 650 mg  650 mg Rectal Q6H PRN    polyethylene glycol (MIRALAX) packet 17 g  17 g Oral DAILY PRN          LAB AND IMAGING FINDINGS:     Lab Results   Component Value Date/Time    WBC 11.9 (H) 09/01/2021 05:01 AM    HGB 11.5 09/01/2021 05:01 AM    PLATELET 532 68/38/1294 05:01 AM     Lab Results   Component Value Date/Time    Sodium 145 09/01/2021 05:01 AM    Potassium 3.4 (L) 09/01/2021 05:01 AM    Chloride 116 (H) 09/01/2021 05:01 AM    CO2 22 09/01/2021 05:01 AM    BUN 33 (H) 09/01/2021 05:01 AM    Creatinine 1.27 (H) 09/01/2021 05:01 AM    Calcium 9.3 09/01/2021 05:01 AM    Magnesium 1.6 08/27/2021 01:44 AM    Phosphorus 3.1 08/27/2021 01:44 AM      Lab Results   Component Value Date/Time    Alk. phosphatase 56 09/01/2021 05:01 AM    Protein, total 6.4 09/01/2021 05:01 AM    Albumin 2.9 (L) 09/01/2021 05:01 AM    Globulin 3.5 09/01/2021 05:01 AM     Lab Results   Component Value Date/Time    INR 1.1 12/20/2015 12:45 PM    Prothrombin time 10.4 12/20/2015 12:45 PM    aPTT 29.8 12/20/2015 12:45 PM      No results found for: IRON, FE, TIBC, IBCT, PSAT, FERR   No results found for: PH, PCO2, PO2  No components found for: [place]   Lab Results   Component Value Date/Time    CK 84 09/16/2015 11:25 AM    CK - MB 1.4 09/16/2015 11:25 AM                Total time: 70m  Counseling / coordination time, spent as noted above: 45m  > 50% counseling / coordination?: y    Prolonged service was provided for  []30 min   []75 min in face to face time in the presence of the patient, spent as noted above.   Time Start:   Time End: Note: this can only be billed with 64179 (initial) or 95035 (follow up). If multiple start / stop times, list each separately.

## 2021-09-07 LAB
GLUCOSE BLD STRIP.AUTO-MCNC: 209 MG/DL (ref 65–117)
GLUCOSE BLD STRIP.AUTO-MCNC: 262 MG/DL (ref 65–117)
SERVICE CMNT-IMP: ABNORMAL
SERVICE CMNT-IMP: ABNORMAL

## 2021-09-07 PROCEDURE — 74011250637 HC RX REV CODE- 250/637: Performed by: INTERNAL MEDICINE

## 2021-09-07 PROCEDURE — 97535 SELF CARE MNGMENT TRAINING: CPT

## 2021-09-07 PROCEDURE — 74011250637 HC RX REV CODE- 250/637: Performed by: FAMILY MEDICINE

## 2021-09-07 PROCEDURE — 82962 GLUCOSE BLOOD TEST: CPT

## 2021-09-07 PROCEDURE — 97116 GAIT TRAINING THERAPY: CPT

## 2021-09-07 PROCEDURE — 97530 THERAPEUTIC ACTIVITIES: CPT

## 2021-09-07 PROCEDURE — 94760 N-INVAS EAR/PLS OXIMETRY 1: CPT

## 2021-09-07 PROCEDURE — 65270000029 HC RM PRIVATE

## 2021-09-07 RX ORDER — METOPROLOL TARTRATE 25 MG/1
12.5 TABLET, FILM COATED ORAL EVERY 12 HOURS
Status: DISCONTINUED | OUTPATIENT
Start: 2021-09-07 | End: 2021-09-11 | Stop reason: HOSPADM

## 2021-09-07 RX ADMIN — AMLODIPINE BESYLATE 10 MG: 5 TABLET ORAL at 09:51

## 2021-09-07 RX ADMIN — QUETIAPINE FUMARATE 50 MG: 100 TABLET ORAL at 09:51

## 2021-09-07 RX ADMIN — CLOPIDOGREL BISULFATE 75 MG: 75 TABLET, FILM COATED ORAL at 09:51

## 2021-09-07 RX ADMIN — QUETIAPINE FUMARATE 50 MG: 100 TABLET ORAL at 18:09

## 2021-09-07 RX ADMIN — ASPIRIN 81 MG: 81 TABLET, CHEWABLE ORAL at 09:51

## 2021-09-07 NOTE — PROGRESS NOTES
Problem: Self Care Deficits Care Plan (Adult)  Goal: *Acute Goals and Plan of Care (Insert Text)  Description:   FUNCTIONAL STATUS PRIOR TO ADMISSION: Patient not able to give history and no family present. Per chart patient lives with niece, but was ambulatory without assistance. HOME SUPPORT: The patient lived with family. Occupational Therapy Goals  Weekly re assessment - 9/7/2021- goals remain appropriate for patient. Will continue x 1 week. Initiated 8/30/2021  1. Patient will perform self-feeding with supervision/set-up within 7 day(s). 2.  Patient will perform grooming with supervision/set-up within 7 day(s). 3.  Patient will perform toilet transfers with supervision/set-up within 7 day(s). 4.  Patient will perform all aspects of toileting with supervision/set-up within 7 day(s). Outcome: Progressing Towards Goal   OCCUPATIONAL THERAPY TREATMENT/WEEKLY RE-EVALUATION  Patient: Lázaro Mera (1 y.o. female)  Date: 9/7/2021  Diagnosis: Severe sepsis ([facility] [A41.9, R65.20] <principal problem not specified>       Precautions:    Chart, occupational therapy assessment, plan of care, and goals were reviewed. ASSESSMENT  Based on the objective data described below, Patient received supine in bed. Patient alert but confused. She follows therapist commands with encouragement. Min assist to EOB. Patient agreeable to light grooming tasks. Min assist for combing hair and washing face. Patient able to  stand with assist x 1 and move to EOB. Difficulty with side steps but patient able to turn and walk to [facility]. Patient returned to bed at end of session today. Current Level of Function Impacting Discharge (ADLs): requires assistance and verbal cues for safety and attention to task    Other factors to consider for discharge:          PLAN :  Goals have been updated based on progression since last assessment.  Patient continues to benefit from skilled intervention to address the above impairments. Continue to follow patient 3 times a week to address goals. Recommend with staff: ADLs as able    Recommend next OT session: continue set goals     Recommendation for discharge: (in order for the patient to meet his/her long term goals)  Therapy up to 5 days/week in SNF setting    This discharge recommendation:  Has been made in collaboration with the attending provider and/or case management    Equipment recommendations for successful discharge (if) home: TBD       SUBJECTIVE:   Patient stated yes.     OBJECTIVE DATA SUMMARY:   Cognitive/Behavioral Status:  Neurologic State: Confused  Orientation Level: Disoriented X4  Cognition: Decreased attention/concentration;Memory loss             Functional Mobility and Transfers for ADLs:  Bed Mobility:  Supine to Sit: Minimum assistance  Sit to Supine: Minimum assistance    Transfers:  Sit to Stand: Minimum assistance          Balance:  Sitting: Intact  Standing: With support  Standing - Static: Constant support  Standing - Dynamic : Fair;Constant support    ADL Intervention:       Grooming  Position Performed: Seated edge of bed  Washing Face: Minimum assistance (to initiate task)  Brushing/Combing Hair: Minimum assistance  Cues: Physical assistance;Verbal cues provided              Upper Body [address]:  Moderate assistance  Cues: Physical assistance;Verbal cues provided                   Pain:      Activity Tolerance:   Fair    After treatment patient left in no apparent distress:   Supine in bed, Call bell within reach, Bed / chair alarm activated, and Side rails x 3    COMMUNICATION/COLLABORATION:   The patients plan of care was discussed with: Physical Therapy Assistant and Registered Nurse    Rickie Baxter OTR/L  Time Calculation: 23 mins

## 2021-09-07 NOTE — PROGRESS NOTES
Bedside shift change report given to [address] [place] [address] (oncoming nurse) by Rosalind Larsen RN (offgoing nurse). Report included the following information SBAR, Kardex, Intake/Output, Recent Results and Med Rec Status.

## 2021-09-07 NOTE — PROGRESS NOTES
9/7/2021  Case Management Progress Note    2:01 PM  Spoke with Sierrakenton Mackenzienamita @ the [address] [place] [address] Elias Gupta [facility] [address]. They have received auth for the patient to go over there and have a bed available today. I then spoke with Celestine Rushing, the patient's great niece, who has been the main contact throughout the patient's hospital stay--per her, everyone was in agreement with the patient going to the [address] including patient's daughter (who is not the mPOA). However after speaking with MD, patient cannot go to rehab due to the fact that she is not taking much PO--he states that she needs to discharge with hospice, whether home or to a facility. We started a Medicaid application, however it will not kick in for about a month, and patient will need more support at home than what she has. Per MD patient's daughter was here all weekend and is on board with hospice, however she is not the power of  and legally cannot make this decision. Patient has had an info session with [facility] closer to admission. Will continue to follow and update. SURJIT Yap    11:42 AM  Patient is 79year old female admitted 8/26 with severe sepsis. Patient's RUR is 18% green/low risk for readmission  Chart reviewed--patient discussed at IDR rounds this morning. Per rounds patient's family is undecided about hospice. Please note they have had an information session with [facility] already earlier in patient's hospitalization. Palliative to see today. Will touch base with patient's family this afternoon to determine plan for discharge--auth was started by the [facility] last week so this may also be an option. Will continue to follow. Transition of Care Plan  1. Continue medical management/treatment  2. Palliative to see today   3. Auth started w/ the [facility]  4. However home with hospice may also be an option  5. Info session w/ [facility] earlier in hospitalization  6.  CM will follow    SURJIT Yap

## 2021-09-07 NOTE — PROGRESS NOTES
Problem: Mobility Impaired (Adult and Pediatric)  Goal: *Acute Goals and Plan of Care (Insert Text)  Description: FUNCTIONAL STATUS PRIOR TO ADMISSION: Patient was modified independent using a rolling walker for functional mobility. HOME SUPPORT PRIOR TO ADMISSION: The patient lived with daughter but did not require assist.    Physical Therapy Goals  Initiated 8/30/2021  1. Patient will move from supine to sit and sit to supine , scoot up and down, and roll side to side in bed with independence within 7 day(s). 2.  Patient will transfer from bed to chair and chair to bed with modified independence using the least restrictive device within 7 day(s). 3.  Patient will perform sit to stand with modified independence within 7 day(s). 4.  Patient will ambulate with modified independence for 200 feet with the least restrictive device within 7 day(s). Note:   PHYSICAL THERAPY TREATMENT  Patient: José Luis Maria (66 y.o. female)  Date: 9/7/2021  Diagnosis: Severe sepsis ([facility] [A41.9, R65.20] <principal problem not specified>       Precautions:    Chart, physical therapy assessment, plan of care and goals were reviewed. ASSESSMENT  Patient continues with skilled PT services. Pt confused but is agreeable to PT. Pt comes to sit with mod assist.Pt sit to stand with min to mod assist.Pt ambulated 12ft around bed mod assist hand held of 1 using free hand on bed rail for support. Pt is very unsteady and is a high fall risk. Pt min to mod assist back to bed. Pt progressing slowly. Continue goals. PLAN :  Patient continues to benefit from skilled intervention to address the above impairments. Continue treatment per established plan of care. to address goals.     Recommendation for discharge: (in order for the patient to meet his/her long term goals)  Therapy up to 5 days/week in SNF setting    This discharge recommendation:  Has been made in collaboration with the attending provider and/or case management    IF patient discharges home will need the following DME: to be determined (TBD)       SUBJECTIVE:       OBJECTIVE DATA SUMMARY:   Critical Behavior:  Neurologic State: Confused  Orientation Level: Disoriented X4  Cognition: Decreased attention/concentration, Memory loss  Safety/Judgement: Decreased awareness of environment  Functional Mobility Training:  Bed Mobility:     Supine to Sit: Moderate assistance  Sit to Supine: Minimum assistance; Moderate assistance           Transfers:  Sit to Stand: Minimum assistance; Moderate assistance  Stand to Sit: Minimum assistance                             Balance:  Sitting: Intact  Standing: With support  Standing - Static: Constant support  Standing - Dynamic : Fair;Constant support  Ambulation/Gait Training:  Distance (ft): 12 Feet (ft)  Assistive Device: Hand held assist  Ambulation - Level of Assistance:  Moderate assistance                 Base of Support: Narrowed     Speed/Rosa Maria: Pace decreased (<100 feet/min)  Step Length: Right shortened;Left shortened             Activity Tolerance:   Fair    After treatment patient left in no apparent distress:   Supine in bed    COMMUNICATION/COLLABORATION:   The patients plan of care was discussed with: Physical therapist.     Nga Rasmussen PTA   Time Calculation: 23 mins

## 2021-09-07 NOTE — ACP (ADVANCE CARE PLANNING)
Advance Care Planning     Advance Care Planning (ACP) Physician/NP/PA Conversation      Date of Conversation: 8/26/2021  Conducted with: Healthcare Decision Maker: Named in Advance Directive or Healthcare Power of  Chapel Mikel:     Primary Decision Maker (Active): Ivon Rodriguez (Niece, mPOA) - [phone]    Today we documented Decision Maker(s) consistent with ACP documents on file. Care Preferences:    Hospitalization: \"If your health worsens and it becomes clear that your chance of recovery is unlikely, what would be your preference regarding hospitalization? \"  The patient would prefer comfort-focused treatment without hospitalization. Ventilation: \"If you were unable to breathe on your own and your chance of recovery was unlikely, what would be your preference about the use of a ventilator (breathing machine) if it was available to you? \"   The patient would NOT desire the use of a ventilator. Resuscitation: \"In the event your heart stopped as a result of an underlying serious health condition, would you want attempts to be made to restart your heart, or would you prefer a natural death? \"   No, do NOT attempt to resuscitate. Additional topics discussed: treatment goals, end of life care preferences (vegetative state/imminent death) and hospice care     Please see palliative care note from yesterday. Daughter is not patient's medical decision maker; it is her niece and primary caregiver KIMBERLEE SNOW. Jennifer is open to a Hospice care plan however patient currently lacks resources for long term care. Jennifer can no longer care for patient at home and daughter is not involved in caregiving. Patient needs to apply for Medicaid. Also note [facility] was already sent a consult on 8/28, day of admission by  Maribel Bass.      Conversation Outcomes / Follow-Up Plan:   ACP complete - no further action today  Reviewed DNR/DNI and patient confirms current DNR status - completed forms on file (place new order if needed)       Katerina Middleton MD

## 2021-09-07 NOTE — PROGRESS NOTES
Bedside shift change report given to Pili (oncoming nurse) by Ed Langston (offgoing nurse). Report included the following information SBAR, Kardex, Intake/Output, MAR and Recent Results.

## 2021-09-07 NOTE — PROGRESS NOTES
Daily Progress Note: 9/7/2021  María Das MD    Assessment/Plan:   Severe sepsis / septic shock: 2/2 PNA. -Resolved  -CXR showed patchy bilateral airspace disease, repeat 8/31 was improving  -Check RVP/COVID-PCR. - Negative  -Completed cefepime and Doxy  -BC neg  -sputum culture couldn't be done, PNA workup (Legionella and Strep Pneumo urine ag, Mycoplasma IgM) all negative     Chronic obstructive pulmonary disease: no wheezing.   -Combivent PRN     Hypertension:   -Restarted Norvasc and metoprolol (8/28)  -Increased Norvasc to 10 q day (9/2)  -not taking medication reliably, BP running up     Hypothyroid:   -check TSH. Continue LT4     CAD (coronary artery disease) / History of heart artery stent:   -cont DAPT, statin.      Diabetes: type 2, associated w/ CKD4.   -not great control, A1c 7.8, tighter control would be high risk, however  -On determir at home.   -Use NPH with SSI here     CKD (chronic kidney disease) stage 4, GFR 15-29 ml/min: stable. -Follow BMP     Acute metabolic encephalopathy 2/2 sepsis.   -Has dementia and is at risk for delirium  -Avoid benzos, opioids, anticholinergics. -Verbally re-direct whenever possible.   -Avoid chemical restraints unless pt is a danger to self or others.  -Haldol as needed  -Wrist restraints as needed when family not present  -Home Seroquel restarted    Dementia  -she has had significant cognitive decline with this illness and hospitalization  -suspect she would do poorly in rehab, family aware  -daughter open to hospice, Palliative Care saw yesterday but did not talk with daughter  -consult CM to consult hospcie  -no longer on IV fluids, not taking much in by mouth       Thrombocytopenia: chronic, stable.    -Follow PLT     Code Status: DNR     Problem List:  Problem List as of 9/7/2021 Date Reviewed: 8/26/2021        Codes Class Noted - Resolved    Thrombocytopenia ([facility] ICD-10-CM: D69.6  ICD-9-CM: 287.5  8/27/2021 - Present Chronic obstructive pulmonary disease (HCC) ICD-10-CM: J44.9  ICD-9-CM: 80  Unknown - Present        Hypertension ICD-10-CM: I10  ICD-9-CM: 401.9  Unknown - Present        Hypothyroid ICD-10-CM: E03.9  ICD-9-CM: 244.9  Unknown - Present        CAD (coronary artery disease) ICD-10-CM: I25.10  ICD-9-CM: 414.00  Unknown - Present        History of heart artery stent ICD-10-CM: Z95.5  ICD-9-CM: V45.82  Unknown - Present        Diabetes ([facility] ICD-10-CM: E11.9  ICD-9-CM: 250.00  Unknown - Present        CKD (chronic kidney disease) stage 4, GFR 15-29 ml/min ([facility] ICD-10-CM: N18.4  ICD-9-CM: 885. 4  Unknown - Present        Severe sepsis (HCC) ICD-10-CM: A41.9, R65.20  ICD-9-CM: 038.9, 995.92  Unknown - Present        Pneumonia ICD-10-CM: J18.9  ICD-9-CM: 704  Unknown - Present        Dementia ([facility] ICD-10-CM: F03.90  ICD-9-CM: 294.20  8/26/2021 - Present        Acute metabolic encephalopathy [facility]-UC: G93.41  ICD-9-CM: 348.31  8/26/2021 - Present              Subjective:    79 y.o. female w/ hx of CAD, HTN, DM, CKD4, dementia presenting w/ fever, weakness. Started 1 day PTA, febrile at home, associated with AMS and lethargy per family. Pt also c/o chest pain and cough. ED workup showed 3/4 SIRS with bilateral PNA evident on CXR. Ms. Shaggy Lilly is admitted for further evaluation and management. (Dr Elinor Canchola)    8/27: She is confused this am. Not able to provide any history and family not present. Off pressors. BP stable. 8/28: Confused. Has been more agitated during the night requiring restraints. Change antibiotic to Doxy so that we could use Haldol. BP is higher so will restart meds. Ask case management to see.     8/29: Daughter at bedside. She is less confused this am. A little more talkative. Family willing to look at rehab as she needs 24 hour care. Discussed with daughter that she should not be left alone. Her daughter lives out of town and works so her niece has been caring for her.      8/30:Sleeping but arouses. Nurses report she had a good night. WBC and Cr have improved. Afebrile. Case management following.     8/31: Pleasantly confused. Appetite has improved. Case management following for placement. Will continue IV antibiotics. Recheck CXR.     9/1: Family looking for SNF/Long term placement. Case management following. WBC has improved. CXR improving. Will continue IV antibiotics for now. 9/2: CM waiting to hear back from [facility] SNF/LTC for bed availability. Pt seen by PT/OT yesterday, did not tolerate well, max assist from laying to sit. Total care for ADL's. Patient on cefepime and doxy. WBC 11.9 (9/1). Patient refused labs this morning as well as her cefepime. Will try to get labs drawn this afternoon and administer cefepime if patient is more cooperative. Increased Norvasc due to HTN    9/3: More agitated and refusing blood draws. Poor appetite. IVF resumed. Family looking for placement. 9/4: Daughter at the bedside all night. Patient very confused overnight, refusing vitals and labs (only fingersticks for the past 72+ hours). Actually did really well with PT yesterday. 9/5: Lost IV acces overnight, not taking in much of anything by mouth, suspect she will continue to decline, will plan to consult Palliative on Tuesday. Daughter at bedside and agrees with this plan, she seems reasonable about her mother's decline; home with hospice is a definite option given her overall decline. 9/6: Continues to take in minimal PO, did take some ginger ale overnight. Does not have an IV. Patient is minimally verbal, family not at the bedside this AM. She denies any complaints. 9/7:  Lethargic this AM, makes minimal verbal response, oriented to name only, not voicing any complaints     Review of Systems:   Review of systems not obtained due to patient factors.     Objective:   Physical Exam:     Visit Vitals  BP (!) 196/91 (BP 1 Location: Left upper arm, BP Patient Position: At rest) Comment: patient moving   Pulse 84   Temp 97.5 °F (36.4 °C)   Resp 20   Ht 5' 2.01\" (1.575 m)   Wt 65 kg (143 lb 4.8 oz)   SpO2 94%   BMI 26.20 kg/m²    O2 Flow Rate (L/min): 2 l/min O2 Device: None (Room air)  Temp (24hrs), Av.5 °F (36.4 °C), Min:97.4 °F (36.3 °C), Max:97.5 °F (36.4 °C)    No intake/output data recorded. No intake/output data recorded. General:  Confused,  no distress, frail appearing. Head:  Normocephalic, without obvious abnormality, atraumatic. Eyes:  Conjunctivae/corneas clear   Nose: Nares normal. Septum midline. Mucosa normal. No drainage or sinus tenderness. Throat: Lips, mucosa, and tongue moist..   Neck: Supple, symmetrical, trachea midline, no adenopathy, thyroid: no enlargement/tenderness/nodules, no carotid bruit and no JVD. Back:   Symmetric, no curvature. ROM normal. No CVA tenderness. Lungs:   Clear to auscultation bilaterally. Chest wall:  No tenderness or deformity. Heart:  Regular rate and rhythm, S1, S2 normal, no murmur, click, rub or gallop. Abdomen:   Soft, non-tender. Bowel sounds normal. No masses,  No organomegaly. Extremities: no cyanosis or edema. No calf tenderness or cords. Pulses: 2+ and symmetric all extremities. Skin: Skin color, texture, turgor normal. No rashes or lesions   Neurologic: CNII-XII intact. Alert and oriented X 1. Fine motor of hands and fingers normal.   equal.  No cogwheeling or rigidity. Gait not tested at this time. Sensation grossly normal to touch. Gross motor of extremities normal.       Data Review:    Port CXR 21   INDICATION: fever, cough  EXAM:  AP CHEST RADIOGRAPH  COMPARISON: 2018  FINDINGS:  AP portable view of the chest demonstrates right subclavian pacemaker. Heart  size is normal. Patchy airspace disease throughout the right lung and left lung  base. No pneumothorax. The osseous structures are unremarkable. IMPRESSION  Patchy bilateral airspace disease.     Portable CXR 8/28/21  IMPRESSION  Improved aeration the right midlung and lower lung zones with  residual patchy airspace opacity. Small left pleural effusion. CXR 8/31/21  IMPRESSION  Improved aeration the right lung with minimal residual patchy  airspace opacity in the right midlung zone. .    Interpretation Summary- ECHO 8/31/21    · LV: Estimated LVEF is 55 - 60%. Normal cavity size and systolic function (ejection fraction normal). Upper normal wall thickness. Wall motion: normal. Mild (grade 1) left ventricular diastolic dysfunction. · Image quality for this study was technically difficult. · MV: Moderate mitral annular calcification. Mild mitral valve regurgitation is present. · LA: Mildly dilated left atrium. · AV: Mild aortic valve stenosis is present. · RV: Pacing wire present       Recent Days:  No results for input(s): WBC, HGB, HCT, PLT, HGBEXT, HCTEXT, PLTEXT, HGBEXT, HCTEXT, PLTEXT in the last 72 hours. No results for input(s): NA, K, CL, CO2, GLU, BUN, CREA, CA, MG, PHOS, ALB, TBIL, TBILI, ALT, INR, INREXT, INREXT in the last 72 hours. No lab exists for component: SGOT  No results for input(s): PH, PCO2, PO2, HCO3, FIO2 in the last 72 hours. 24 Hour Results:  No results found for this or any previous visit (from the past 24 hour(s)).     Medications reviewed  Current Facility-Administered Medications   Medication Dose Route Frequency    QUEtiapine (SEROquel) tablet 50 mg  50 mg Oral BID    amLODIPine (NORVASC) tablet 10 mg  10 mg Oral DAILY    hydrALAZINE (APRESOLINE) 20 mg/mL injection 10 mg  10 mg IntraVENous Q6H PRN    ondansetron (ZOFRAN) injection 4 mg  4 mg IntraVENous Q6H PRN    aspirin chewable tablet 81 mg  81 mg Oral DAILY    clopidogreL (PLAVIX) tablet 75 mg  75 mg Oral DAILY    metoprolol succinate (TOPROL-XL) XL tablet 25 mg  25 mg Oral DAILY    famotidine (PEPCID) tablet 20 mg  20 mg Oral QHS    ipratropium-albuterol (COMBIVENT RESPIMAT) 20 mcg-100 mcg inhalation spray  1 Puff Inhalation Q6H PRN    haloperidol lactate (HALDOL) injection 1 mg  1 mg IntraMUSCular Q6H PRN    sodium chloride (NS) flush 5-10 mL  5-10 mL IntraVENous PRN    sodium chloride (NS) flush 5-40 mL  5-40 mL IntraVENous Q8H    sodium chloride (NS) flush 5-40 mL  5-40 mL IntraVENous PRN    acetaminophen (TYLENOL) tablet 650 mg  650 mg Oral Q6H PRN    Or    acetaminophen (TYLENOL) suppository 650 mg  650 mg Rectal Q6H PRN    polyethylene glycol (MIRALAX) packet 17 g  17 g Oral DAILY PRN       Care Plan discussed with: Patient/Family and Nurse    Total time spent with patient: 30 minutes.     Antione Noble MD

## 2021-09-08 PROCEDURE — 97116 GAIT TRAINING THERAPY: CPT

## 2021-09-08 PROCEDURE — 97530 THERAPEUTIC ACTIVITIES: CPT

## 2021-09-08 PROCEDURE — 74011250637 HC RX REV CODE- 250/637: Performed by: INTERNAL MEDICINE

## 2021-09-08 PROCEDURE — 74011250637 HC RX REV CODE- 250/637: Performed by: FAMILY MEDICINE

## 2021-09-08 PROCEDURE — 97535 SELF CARE MNGMENT TRAINING: CPT

## 2021-09-08 PROCEDURE — 99233 SBSQ HOSP IP/OBS HIGH 50: CPT | Performed by: PHYSICAL MEDICINE & REHABILITATION

## 2021-09-08 PROCEDURE — 93005 ELECTROCARDIOGRAM TRACING: CPT

## 2021-09-08 PROCEDURE — 74011000250 HC RX REV CODE- 250: Performed by: FAMILY MEDICINE

## 2021-09-08 PROCEDURE — 65270000029 HC RM PRIVATE

## 2021-09-08 RX ADMIN — METOPROLOL TARTRATE 12.5 MG: 25 TABLET, FILM COATED ORAL at 22:46

## 2021-09-08 RX ADMIN — METOPROLOL TARTRATE 12.5 MG: 25 TABLET, FILM COATED ORAL at 09:04

## 2021-09-08 RX ADMIN — ASPIRIN 81 MG: 81 TABLET, CHEWABLE ORAL at 09:03

## 2021-09-08 RX ADMIN — CLOPIDOGREL BISULFATE 75 MG: 75 TABLET, FILM COATED ORAL at 09:04

## 2021-09-08 RX ADMIN — QUETIAPINE FUMARATE 50 MG: 100 TABLET ORAL at 09:04

## 2021-09-08 RX ADMIN — AMLODIPINE BESYLATE 10 MG: 5 TABLET ORAL at 09:04

## 2021-09-08 RX ADMIN — FAMOTIDINE 20 MG: 20 TABLET ORAL at 22:47

## 2021-09-08 NOTE — PROGRESS NOTES
Problem: Mobility Impaired (Adult and Pediatric)  Goal: *Acute Goals and Plan of Care (Insert Text)  Description: FUNCTIONAL STATUS PRIOR TO ADMISSION: Patient was modified independent using a rolling walker for functional mobility. HOME SUPPORT PRIOR TO ADMISSION: The patient lived with daughter but did not require assist.    Physical Therapy Goals  Initiated 8/30/2021  1. Patient will move from supine to sit and sit to supine , scoot up and down, and roll side to side in bed with independence within 7 day(s). 2.  Patient will transfer from bed to chair and chair to bed with modified independence using the least restrictive device within 7 day(s). 3.  Patient will perform sit to stand with modified independence within 7 day(s). 4.  Patient will ambulate with modified independence for 200 feet with the least restrictive device within 7 day(s). Note:   PHYSICAL THERAPY TREATMENT  Patient: Shira Carlos (4 y.o. female)  Date: 9/8/2021  Diagnosis: [facility] [A41.9, R65.20] <principal problem not specified>       Precautions:    Chart, physical therapy assessment, plan of care and goals were reviewed. ASSESSMENT  Patient continues with skilled PT services. Pt is pleasantly confused. Pt supine to sit with min assist.Pt sit to stand with min assist.Pt ambulated 24ft with min to mod assist of 2 hand held. Pt was assisted to restroom before  ambulating back to bed. Pt is unsteady. Pt is unable to manage RW at this time. Pt min assist sit to supine. Pt progressing slowly. Continue goals. PLAN :  Patient continues to benefit from skilled intervention to address the above impairments. Continue treatment per established plan of care. to address goals.     Recommendation for discharge: (in order for the patient to meet his/her long term goals)  Therapy up to 5 days/week in SNF setting    This discharge recommendation:  Has been made in collaboration with the attending provider and/or case management    IF patient discharges home will need the following DME: to be determined (TBD)       SUBJECTIVE:       OBJECTIVE DATA SUMMARY:   Critical Behavior:  Neurologic State: Eyes open spontaneously, Confused  Orientation Level: Disoriented X4  Cognition: Impaired decision making, Decreased attention/concentration  Safety/Judgement: Decreased awareness of environment  Functional Mobility Training:  Bed Mobility:     Supine to Sit: Minimum assistance  Sit to Supine: Minimum assistance           Transfers:  Sit to Stand: Minimum assistance  Stand to Sit: Contact guard assistance;Minimum assistance                 Balance:  Sitting: High guard  Standing: With support  Standing - Static: Constant support  Ambulation/Gait Training:  Distance (ft): 24 Feet (ft)  Assistive Device: Gait belt  hand held of 2  Ambulation - Level of Assistance: Minimal assistance; Moderate assistance;Assist x2        Gait Abnormalities: Decreased step clearance; Path deviations        Base of Support: Narrowed     Speed/Rosa Maria: Pace decreased (<100 feet/min)  Step Length: Right shortened;Left shortened            Activity Tolerance:   Fair    After treatment patient left in no apparent distress:   Supine in bed    COMMUNICATION/COLLABORATION:   The patients plan of care was discussed with: Physical therapist.     Katie Monet PTA   Time Calculation: 38 mins

## 2021-09-08 NOTE — PROGRESS NOTES
Palliative Medicine      Code Status: DNR    Advance Care Planning:  Advance Care Planning 9/8/2021   Patient's Healthcare Decision Maker is: Named in scanned ACP document   Confirm Advance Directive Yes, on file       Patient / Family Encounter Documentation    Participants (names):  Naldo Costa [address] by phone, Palliative Medicine (Dr. Berkley Combs, [address])    Narrative: SW attempted supportive visit. Pt was resting quietly in bed, no family present. SW allowed pt to rest, did observe pt working with PT/OT earlier, pt walked to bathroom with assistance, was able to reposition self in bed. Pt has AMD on file dated 8/31/2016 which appoints naldo Schreiber as sole Medical POA. Palliative team spoke with Poornima Hernandez dtr [address] by phone. Family shared that pt was eating three meals a day in addition to snacks up until 2 days prior to admission. Pt's intake is now limited, family expressed that a peg tube would not be in line with pt's wishes, expressed concern that pt would pull out NG tube or IV lines for temporary nutrition. Family expressed that pt is in her final stage of life, wish for that time to be comfortable. Family is no longer able to care for pt at home but are not willing to pursue nursing home placement, stated they agree with Attending Physician that pt should remain in the hospital.     Pt currently has inpt DNR order in place. Palliative Medicine Physician Dr. Nigel Grimaldo left DDNR for naldo to sign earlier in week; document was not located on chart. Naldo agreed to sign another form today; DDNR has been scanned to naldo for signature, will be placed on chart once complete. Psychosocial Issues Identified/ Resilience Factors: Caregiver stress, naldo and great-niece have reportedly been caring for another elderly family member in the home in addition to pt. Pt has a dtr who has been visiting during this hospitalization but is not POA.   No spiritual concerns identified at this time. Goals of Care / Plan:  Discussed with Care Manager. Pt does not meet in hospice criteria at this time, does not meet criteria for [facility]. PT and OT recommend SNF. SW will follow for support. Thank you for including Palliative Medicine in the care of Ms. Jordan.      Jean Pierre Burnett LCSW, [facility] Hospital[facility]-SW  [phone])

## 2021-09-08 NOTE — PROGRESS NOTES
Palliative Medicine Consult  Johnie: [phone]    Patient Name: Kodi Viveros  YOB: 1940    Date of Initial Consult: 9/6/21  Reason for Consult: Care decisions  Requesting Provider: Dr. Major Duverney  Primary Care Physician: Austin Harris MD     SUMMARY:   Kodi Viveros is a 79 y.o. lady with a medical history of CVA, dementia, COPD CKD4 and CAD who was admitted on 8/26/2021 to [facility] with a diagnosis of severe sepsis secondary to PNA. Admission CXR showed patchy bilateral airspace disease. Current medical issues leading to Palliative Medicine involvement include: progressive dementia. PTA patient has been residing at home with her niece and niece' daughter. She remained ambulatory but confused at baseline, often did not recognize family. She was able to eat and drink and in fact food would often calm her down when agitated. She was on Seroquel 100 mg BID at home due to agitation. She had no reported dysphagia and would accept all pills at home prior to this admission. Psychosocial Hx- . She has one daughter. She has been cared for at home by her niece and Jennifer and [address] daughter Phillip Romero for the past 7 yrs. She does have a daughter who is not involved in decision making or care, but does visit. PALLIATIVE DIAGNOSES:   1. Goals of care discussion   2. Advanced dementia, suspect vascular type  3. Severe sepsis- resolved  4. Pneumonia- resolved, on RA, completed IV abx course in house       PLAN:   1. Pt w/ advanced dementia but able to make eye contact, follow very simple commands, working w/ PT/OT and smiles. Taking po medications but very minimal po intake (took bites of yogurt w/ dtr today, meds w/ ice cream w/ RN, I see her taking sips of diet coke). 2. Appreciate care management's assistance- niece/Irene Del Valle cannot care for pt in her home any longer and is going to start Medicaid application for LTC placement.  In the meantime pt has completed abx course, remains hemodynamically stable, taking po medication. Is a high risk for readmission if she leaves without hospice but given the fact she is able to work w/ therapies in my opinion makes SNF for rehab appropriate. Per therapy, is progressing towards rehab goals. Increasing her mobility after her prolonged hospital stay here for sepsis/PNA benefits her overall well being, decreasing the risk of acute infections associated w/ debility. 3. Pt does not meet criteria for inpatient hospice. 4. If pt has another acute event, will talk to family about complete comfort measures approach at that time again - if this occurs, could discuss GIP. 5. Speak w/ dtr Luane Goltz who is at bedside and in communication w/ Valentina Louise- they just spoke this AM. There were family dynamics/family health conditions that made it so that she could not care for pt- so was very grateful ~ 5 years ago when Valentina Dani could take pt into her home. She has concerns about pt's poor eating. 6. Leave message for [employer]. When my colleague spoke w/ her earlier this week the plan was for SNF w/ eventual hospice once placement can be found. 7. When I speak w/ Valentina Louise and her dttr Randa Tarango along w/ Mathieu Lema LCSW they share dtr's concern about poor po intake and that pt will come back to the hospital quickly if d/c to SNF- they say that they are in agreement w/ attending physician on this point. Pt would not want a NGT (would pull it out) and certainly not a PEG- and they are comfortable with the idea of comfort measures and hospice. 8. Do defer to primary team with regard to d/c planning - will relay family concerns to care management. 9. Cont current measures- at this point pt essentially comfort measures. Dispo TBD. 10. DDNR to be signed by naldo. 6.   12. Dtr knows about place for rehab and hospice. Is concerned that all the transitions for hospice will be hard on patient.    13. Following w/ you. Naldo to sign DDNR. 14. Thank you for the opportunity to participate in the care of Kimber Jordan  15. Communicated plan of care with: Palliative IDT, [facility] Team incl OT, Sami Sample care management and RN. Please call me with questions. GOALS OF CARE / TREATMENT PREFERENCES:     GOALS OF CARE:  Patient/Health Care Proxy Stated Goals: Comfort    TREATMENT PREFERENCES:   Code Status: DNR    Patient's personal goals include: n/a- mPOA/niece reports goal is for her to be comfortable, respect her wishes of refusal of interventions    Important upcoming milestones or family events: none reported    The patient identifies the following as important for living well: n/a      Advance Care Planning:  [x] [facility] Interdisciplinary Team has updated the ACP Navigator with Mendel and Patient Capacity      Primary Decision Maker (Active): Torey Umana (Niece, mPOA) - [phone]     Advance Care Planning 9/8/2021   Patient's Healthcare Decision Maker is: Named in scanned ACP document   Confirm Advance Directive Yes, on file       Medical Interventions: Limited additional interventions       Other:    As far as possible, the palliative care team has discussed with patient / health care proxy about goals of care / treatment preferences for patient. HISTORY:     History obtained from: naldo Ledezma Cope: \"Thank you\"    HPI/SUBJECTIVE:    The patient is:   [] Verbal and participatory  [x] Non-participatory due to:  Dementia    Pt is alert and communicative, but very confused. Working w/ therapy, walking w/ 2 person assist from bathroom to bed. When I compliment her leanna smile she makes very good eye contact and says \"thank you. \"    Clinical Pain Assessment (nonverbal scale for severity on nonverbal patients):   Clinical Pain Assessment  Severity: 0     Activity (Movement): Lying quietly, normal position    Duration: for how long has pt been experiencing pain (e.g., 2 days, 1 month, years)  Frequency: how often pain is an issue (e.g., several times per day, once every few days, constant)     FUNCTIONAL ASSESSMENT:     Palliative Performance Scale (PPS):  PPS: 20       PSYCHOSOCIAL/SPIRITUAL SCREENING:     Palliative IDT has assessed this patient for cultural preferences / practices and a referral made as appropriate to needs (Cultural Services, Patient Advocacy, Ethics, etc.)    Any spiritual / Cheondoism concerns:  [] Yes /  [x] No    Caregiver Burnout:  [] Yes /  [] No /  [x] No Caregiver Present      Anticipatory grief assessment:   [x] Normal  / [] Maladaptive       ESAS Anxiety: Anxiety: 0    ESAS Depression:       Cannot obtain due to patient factors     REVIEW OF SYSTEMS:     Positive and pertinent negative findings in ROS are noted above in HPI. The following systems were [x] reviewed / [] unable to be reviewed as noted in HPI  Other findings are noted below. Systems: constitutional, ears/nose/mouth/throat, respiratory, gastrointestinal, genitourinary, musculoskeletal, integumentary, neurologic, psychiatric, endocrine. Positive findings noted below. Modified ESAS Completed by: provider           Pain: 0   Anxiety: 0 Nausea: 0   Anorexia: 10 Dyspnea: 0           Stool Occurrence(s): 0        PHYSICAL EXAM:     From RN flowsheet:  Wt Readings from Last 3 Encounters:   09/05/21 143 lb 4.8 oz (65 kg)   12/03/18 171 lb (77.6 kg)   02/09/18 171 lb (77.6 kg)     Blood pressure 138/80, pulse 76, temperature 97 °F (36.1 °C), resp. rate 20, height 5' 2.01\" (1.575 m), weight 143 lb 4.8 oz (65 kg), SpO2 94 %. Pain Scale 1: Numeric (0 - 10)  Pain Intensity 1: 0                 Last bowel movement, if known: Working w/ OT, following simple commands intermittently when therapy team asks verbally and w/ demonstrations   Sclerae anicteric  CVS:  Reg   Respirations unlabored on RA.    Abdomen:  Soft, NTND, soft BS  Alert but not oriented at all, confused, unable to provide ROS, very limited command following. No rigidity or tremor. Calm, not agitated. HISTORY:     Active Problems:    Chronic obstructive pulmonary disease (HCC) ()      Hypertension ()      Hypothyroid ()      CAD (coronary artery disease) ()      History of heart artery stent ()      Diabetes (HCC) ()      CKD (chronic kidney disease) stage 4, GFR 15-29 ml/min ([facility]) ()      Severe sepsis (HCC) ()      Pneumonia ()      Dementia ([facility] (8/26/2021)      Acute metabolic encephalopathy (9/29/6604)      Thrombocytopenia ([facility] (8/27/2021)      Past Medical History:   Diagnosis Date    Chronic obstructive pulmonary disease (HCC)     [facility]     Diverticulitis     DVT (deep venous thrombosis) ([facility])     History of heart artery stent     Hypertension     Hypothyroid     Renal failure       Past Surgical History:   Procedure Laterality Date    HX APPENDECTOMY      HX CYST REMOVAL      HX HYSTERECTOMY      HX IMPLANTABLE CARDIOVERTER DEFIBRILLATOR      HX PACEMAKER        History reviewed. No pertinent family history. History reviewed, no pertinent family history.   Social History     Tobacco Use    Smoking status: Never Smoker    Smokeless tobacco: Never Used   Substance Use Topics    Alcohol use: No     Allergies   Allergen Reactions    Codeine Nausea and Vomiting      Current Facility-Administered Medications   Medication Dose Route Frequency    metoprolol tartrate (LOPRESSOR) tablet 12.5 mg  12.5 mg Oral Q12H    QUEtiapine (SEROquel) tablet 50 mg  50 mg Oral BID    amLODIPine (NORVASC) tablet 10 mg  10 mg Oral DAILY    hydrALAZINE (APRESOLINE) 20 mg/mL injection 10 mg  10 mg IntraVENous Q6H PRN    ondansetron (ZOFRAN) injection 4 mg  4 mg IntraVENous Q6H PRN    aspirin chewable tablet 81 mg  81 mg Oral DAILY    clopidogreL (PLAVIX) tablet 75 mg  75 mg Oral DAILY    famotidine (PEPCID) tablet 20 mg  20 mg Oral QHS    ipratropium-albuterol (COMBIVENT RESPIMAT) 20 mcg-100 mcg inhalation spray  1 Puff Inhalation Q6H PRN    haloperidol lactate (HALDOL) injection 1 mg  1 mg IntraMUSCular Q6H PRN    sodium chloride (NS) flush 5-10 mL  5-10 mL IntraVENous PRN    sodium chloride (NS) flush 5-40 mL  5-40 mL IntraVENous Q8H    sodium chloride (NS) flush 5-40 mL  5-40 mL IntraVENous PRN    acetaminophen (TYLENOL) tablet 650 mg  650 mg Oral Q6H PRN    Or    acetaminophen (TYLENOL) suppository 650 mg  650 mg Rectal Q6H PRN    polyethylene glycol (MIRALAX) packet 17 g  17 g Oral DAILY PRN          LAB AND IMAGING FINDINGS:     Lab Results   Component Value Date/Time    WBC 11.9 (H) 09/01/2021 05:01 AM    HGB 11.5 09/01/2021 05:01 AM    PLATELET 421 05/35/3363 05:01 AM     Lab Results   Component Value Date/Time    Sodium 145 09/01/2021 05:01 AM    Potassium 3.4 (L) 09/01/2021 05:01 AM    Chloride 116 (H) 09/01/2021 05:01 AM    CO2 22 09/01/2021 05:01 AM    BUN 33 (H) 09/01/2021 05:01 AM    Creatinine 1.27 (H) 09/01/2021 05:01 AM    Calcium 9.3 09/01/2021 05:01 AM    Magnesium 1.6 08/27/2021 01:44 AM    Phosphorus 3.1 08/27/2021 01:44 AM      Lab Results   Component Value Date/Time    Alk.  phosphatase 56 09/01/2021 05:01 AM    Protein, total 6.4 09/01/2021 05:01 AM    Albumin 2.9 (L) 09/01/2021 05:01 AM    Globulin 3.5 09/01/2021 05:01 AM     Lab Results   Component Value Date/Time    INR 1.1 12/20/2015 12:45 PM    Prothrombin time 10.4 12/20/2015 12:45 PM    aPTT 29.8 12/20/2015 12:45 PM      No results found for: IRON, FE, TIBC, IBCT, PSAT, FERR   No results found for: PH, PCO2, PO2  No components found for: [place]   Lab Results   Component Value Date/Time    CK 84 09/16/2015 11:25 AM    CK - MB 1.4 09/16/2015 11:25 AM                Total time: 35 min   Counseling / coordination time, spent as noted above: 30 min   > 50% counseling / coordination?: y    Prolonged service was provided for  []30 min   []75 min in face to face time in the presence of the patient, spent as noted above.  Time Start:   Time End:   Note: this can only be billed with 06856 (initial) or 29091 (follow up). If multiple start / stop times, list each separately.

## 2021-09-08 NOTE — PROGRESS NOTES
Problem: Self Care Deficits Care Plan (Adult)  Goal: *Acute Goals and Plan of Care (Insert Text)  Description:   FUNCTIONAL STATUS PRIOR TO ADMISSION: Patient not able to give history and no family present. Per chart patient lives with niece, but was ambulatory without assistance. HOME SUPPORT: The patient lived with family. Occupational Therapy Goals  Weekly re assessment - 9/7/2021- goals remain appropriate for patient. Will continue x 1 week. Initiated 8/30/2021  1. Patient will perform self-feeding with supervision/set-up within 7 day(s). 2.  Patient will perform grooming with supervision/set-up within 7 day(s). 3.  Patient will perform toilet transfers with supervision/set-up within 7 day(s). 4.  Patient will perform all aspects of toileting with supervision/set-up within 7 day(s). Outcome: Progressing Towards Goal   OCCUPATIONAL THERAPY TREATMENT  Patient: Wale Ovalles (1 y.o. female)  Date: 9/8/2021  Diagnosis: Severe sepsis ([facility] [A41.9, R65.20] <principal problem not specified>       Precautions:    Chart, occupational therapy assessment, plan of care, and goals were reviewed. ASSESSMENT  Patient continues with skilled OT services and is progressing towards goals. Pt pleasantly confused. Sits edge of bed and assisted to doff/don clean gown. Pt ambulated to restroom and 2 assist to transfer to commode, minimal assist for cloth mgt and hygiene, confusion as to what to do with toilet paper. She needed minimal assist to wash/rinse hands, tactile and verbal cues to task. Pt fatigued following activities and returned to bed. Current Level of Function Impacting Discharge (ADLs): min assist to doff/don gown, min assist toileting task    Other factors to consider for discharge:          PLAN :  Patient continues to benefit from skilled intervention to address the above impairments. Continue treatment per established plan of care to address goals.     Recommend with staff: 2 assist to restroom, min assist ADL's    Recommend next OT session: cont towards goals    Recommendation for discharge: (in order for the patient to meet his/her long term goals)  Therapy up to 5 days/week in SNF setting    This discharge recommendation:  Has not yet been discussed the attending provider and/or case management    IF patient discharges home will need the following DME:        SUBJECTIVE:   Patient stated     OBJECTIVE DATA SUMMARY:   Cognitive/Behavioral Status:  Neurologic State: Eyes open spontaneously;Confused  Orientation Level: Disoriented X4  Cognition: Impaired decision making;Decreased attention/concentration             Functional Mobility and Transfers for ADLs:  Bed Mobility:   Min assist    Transfers:  Sit to Stand: Minimum assistance;Assist x2  Functional Transfers  Toilet Transfer : Minimum assistance       Balance: Impaired standing balance       ADL Intervention:       Grooming  Grooming Assistance: Minimum assistance; Moderate assistance  Position Performed: Standing  Washing Hands: Minimum assistance; Moderate assistance  Cues: Physical assistance; Tactile cues provided;Verbal cues provided              Upper Body Dressing Assistance  Dressing Assistance: Moderate assistance  Hospital Gown: Moderate assistance         Toileting  Toileting Assistance: Minimum assistance  Bladder Hygiene: Minimum assistance  Clothing Management: Moderate assistance         Activity Tolerance:   Fair    After treatment patient left in no apparent distress:   Supine in bed and Call bell within reach    COMMUNICATION/COLLABORATION:   The patients plan of care was discussed with: Physical therapy assistant, Occupational therapist, and Registered nurse.      FLORA Oliveira  Time Calculation: 15 mins

## 2021-09-08 NOTE — PROGRESS NOTES
Daily Progress Note: 9/8/2021  Hamzah Lamar MD    Assessment/Plan:   Severe sepsis / septic shock: 2/2 PNA. -Resolved  -CXR showed patchy bilateral airspace disease, repeat 8/31 was improving  -Check RVP/COVID-PCR. - Negative  -Completed cefepime and Doxy  -BC neg  -sputum culture couldn't be done, PNA workup (Legionella and Strep Pneumo urine ag, Mycoplasma IgM) all negative     Chronic obstructive pulmonary disease: no wheezing.   -Combivent PRN     Hypertension:   -Restarted Norvasc and metoprolol (8/28)  -Increased Norvasc to 10 q day (9/2)  -not taking medication reliably, did take PO meds yesterday for the first time in 6 days  -BP better after taking meds     Hypothyroid:   -check TSH. Continue LT4     CAD (coronary artery disease) / History of heart artery stent:   -cont DAPT, statin, taking intermittently     Diabetes: type 2, associated w/ CKD4.   -not great control, A1c 7.8, tighter control would be high risk, however  -On determir at home.   -Use NPH with SSI here     CKD (chronic kidney disease) stage 4, GFR 15-29 ml/min: stable. -recheck BMP in AM if she will cooperate     Acute metabolic encephalopathy 2/2 sepsis.   -Has dementia and is at risk for delirium  -Avoid benzos, opioids, anticholinergics.    -Verbally re-direct whenever possible.   -Avoid chemical restraints unless pt is a danger to self or others.  -Haldol as needed  -Wrist restraints as needed when family not present  -Home Seroquel restarted    Dementia  -she has had significant cognitive decline with this illness and hospitalization  -daughter was here at bedside all weekend, she was apprised of patient's condition including ongoing decline making her inappropriate for rehab  -Palliative Care following, niece (mPOA) reportedly unable to take care of patient in her home  -if she isn't eating or taking meds, then rehab is not an option  -CM, hospice and Palliative Care will need to work on alternate plans for discharge      Thrombocytopenia: chronic, stable. -Follow PLT     Code Status: DNR     Problem List:  Problem List as of 9/8/2021 Date Reviewed: 8/26/2021        Codes Class Noted - Resolved    Thrombocytopenia ([facility] ICD-10-CM: D69.6  ICD-9-CM: 287.5  8/27/2021 - Present        Chronic obstructive pulmonary disease (HCC) ICD-10-CM: J44.9  ICD-9-CM: 80  Unknown - Present        Hypertension ICD-10-CM: I10  ICD-9-CM: 401.9  Unknown - Present        Hypothyroid ICD-10-CM: E03.9  ICD-9-CM: 244.9  Unknown - Present        CAD (coronary artery disease) ICD-10-CM: I25.10  ICD-9-CM: 414.00  Unknown - Present        History of heart artery stent ICD-10-CM: Z95.5  ICD-9-CM: V45.82  Unknown - Present        Diabetes ([facility] ICD-10-CM: E11.9  ICD-9-CM: 250.00  Unknown - Present        CKD (chronic kidney disease) stage 4, GFR 15-29 ml/min ([facility] ICD-10-CM: N18.4  ICD-9-CM: 632. 4  Unknown - Present        Severe sepsis (HCC) ICD-10-CM: A41.9, R65.20  ICD-9-CM: 038.9, 995.92  Unknown - Present        Pneumonia ICD-10-CM: J18.9  ICD-9-CM: 641  Unknown - Present        Dementia ([facility] ICD-10-CM: F03.90  ICD-9-CM: 294.20  8/26/2021 - Present        Acute metabolic encephalopathy TTY-59-PT: G93.41  ICD-9-CM: 348.31  8/26/2021 - Present              Subjective:    79 y.o. female w/ hx of CAD, HTN, DM, CKD4, dementia presenting w/ fever, weakness. Started 1 day PTA, febrile at home, associated with AMS and lethargy per family. Pt also c/o chest pain and cough. ED workup showed 3/4 SIRS with bilateral PNA evident on CXR. Ms. Benoit Alfonso is admitted for further evaluation and management. (Dr Leona Waters)    8/27: She is confused this am. Not able to provide any history and family not present. Off pressors. BP stable. 8/28: Confused. Has been more agitated during the night requiring restraints. Change antibiotic to Doxy so that we could use Haldol. BP is higher so will restart meds.  Ask case management to see.     8/29: Daughter at bedside. She is less confused this am. A little more talkative. Family willing to look at rehab as she needs 24 hour care. Discussed with daughter that she should not be left alone. Her daughter lives out of town and works so her niece has been caring for her. 8/30:Sleeping but arouses. Nurses report she had a good night. WBC and Cr have improved. Afebrile. Case management following.     8/31: Pleasantly confused. Appetite has improved. Case management following for placement. Will continue IV antibiotics. Recheck CXR.     9/1: Family looking for SNF/Long term placement. Case management following. WBC has improved. CXR improving. Will continue IV antibiotics for now. 9/2: CM waiting to hear back from [facility] SNF/LTC for bed availability. Pt seen by PT/OT yesterday, did not tolerate well, max assist from laying to sit. Total care for ADL's. Patient on cefepime and doxy. WBC 11.9 (9/1). Patient refused labs this morning as well as her cefepime. Will try to get labs drawn this afternoon and administer cefepime if patient is more cooperative. Increased Norvasc due to HTN    9/3: More agitated and refusing blood draws. Poor appetite. IVF resumed. Family looking for placement. 9/4: Daughter at the bedside all night. Patient very confused overnight, refusing vitals and labs (only fingersticks for the past 72+ hours). Actually did really well with PT yesterday. 9/5: Lost IV acces overnight, not taking in much of anything by mouth, suspect she will continue to decline, will plan to consult Palliative on Tuesday. Daughter at bedside and agrees with this plan, she seems reasonable about her mother's decline; home with hospice is a definite option given her overall decline. 9/6: Continues to take in minimal PO, did take some ginger ale overnight. Does not have an IV. Patient is minimally verbal, family not at the bedside this AM. She denies any complaints.     9/7:  Lethargic this AM, makes minimal verbal response, oriented to name only, not voicing any complaints    :  Daughter at bedside again this AM, patient did take pills yesterday, not drinking or eating any better. Patient isn't really making any verbal responses but not voicing any complaints.       Review of Systems:   Review of systems not obtained due to patient factors. Objective:   Physical Exam:     Visit Vitals  /76 (BP 1 Location: Left upper arm, BP Patient Position: At rest)   Pulse 74   Temp 97.6 °F (36.4 °C)   Resp 20   Ht 5' 2.01\" (1.575 m)   Wt 65 kg (143 lb 4.8 oz)   SpO2 91%   BMI 26.20 kg/m²    O2 Flow Rate (L/min): 2 l/min O2 Device: None (Room air)  Temp (24hrs), Av.8 °F (36.6 °C), Min:97.6 °F (36.4 °C), Max:97.9 °F (36.6 °C)    No intake/output data recorded.  07 -  1900  In: 300 [P.O.:300]  Out: 0   General:  Confused,  no distress, frail appearing. Head:  Normocephalic, without obvious abnormality, atraumatic. Eyes:  Conjunctivae/corneas clear   Nose: Nares normal. Septum midline. Mucosa normal. No drainage or sinus tenderness. Throat: Lips, mucosa, and tongue moist..   Neck: Supple, symmetrical, trachea midline, no adenopathy, thyroid: no enlargement/tenderness/nodules, no carotid bruit and no JVD. Back:   Symmetric, no curvature. ROM normal. No CVA tenderness. Lungs:   Clear to auscultation bilaterally. Chest wall:  No tenderness or deformity. Heart:  Regular rate and rhythm, S1, S2 normal, no murmur, click, rub or gallop. Abdomen:   Soft, non-tender. Bowel sounds normal. No masses,  No organomegaly. Extremities: no cyanosis or edema. No calf tenderness or cords. Pulses: 2+ and symmetric all extremities. Skin: Skin color, texture, turgor normal. No rashes or lesions   Neurologic: CNII-XII intact. Alert and oriented X 1. Fine motor of hands and fingers normal.   equal.  No cogwheeling or rigidity. Gait not tested at this time. Sensation grossly normal to touch. Gross motor of extremities normal.       Data Review:    Port CXR 8/27/21   INDICATION: fever, cough  EXAM:  AP CHEST RADIOGRAPH  COMPARISON: December 4, 2018  FINDINGS:  AP portable view of the chest demonstrates right subclavian pacemaker. Heart  size is normal. Patchy airspace disease throughout the right lung and left lung  base. No pneumothorax. The osseous structures are unremarkable. IMPRESSION  Patchy bilateral airspace disease. Portable CXR 8/28/21  IMPRESSION  Improved aeration the right midlung and lower lung zones with  residual patchy airspace opacity. Small left pleural effusion. CXR 8/31/21  IMPRESSION  Improved aeration the right lung with minimal residual patchy  airspace opacity in the right midlung zone. .    Interpretation Summary- ECHO 8/31/21    · LV: Estimated LVEF is 55 - 60%. Normal cavity size and systolic function (ejection fraction normal). Upper normal wall thickness. Wall motion: normal. Mild (grade 1) left ventricular diastolic dysfunction. · Image quality for this study was technically difficult. · MV: Moderate mitral annular calcification. Mild mitral valve regurgitation is present. · LA: Mildly dilated left atrium. · AV: Mild aortic valve stenosis is present. · RV: Pacing wire present       Recent Days:  No results for input(s): WBC, HGB, HCT, PLT, HGBEXT, HCTEXT, PLTEXT, HGBEXT, HCTEXT, PLTEXT in the last 72 hours. No results for input(s): NA, K, CL, CO2, GLU, BUN, CREA, CA, MG, PHOS, ALB, TBIL, TBILI, ALT, INR, INREXT, INREXT in the last 72 hours. No lab exists for component: SGOT  No results for input(s): PH, PCO2, PO2, HCO3, FIO2 in the last 72 hours.     24 Hour Results:  Recent Results (from the past 24 hour(s))   GLUCOSE, POC    Collection Time: 09/07/21 11:25 AM   Result Value Ref Range    Glucose (POC) 262 (H) 65 - 117 mg/dL    Performed by Luz Maria 51, POC    Collection Time: 09/07/21  4:00 PM   Result Value Ref Range    Glucose (POC) 209 (H) 65 - 117 mg/dL    Performed by Nando Johnson        Medications reviewed  Current Facility-Administered Medications   Medication Dose Route Frequency    metoprolol tartrate (LOPRESSOR) tablet 12.5 mg  12.5 mg Oral Q12H    QUEtiapine (SEROquel) tablet 50 mg  50 mg Oral BID    amLODIPine (NORVASC) tablet 10 mg  10 mg Oral DAILY    hydrALAZINE (APRESOLINE) 20 mg/mL injection 10 mg  10 mg IntraVENous Q6H PRN    ondansetron (ZOFRAN) injection 4 mg  4 mg IntraVENous Q6H PRN    aspirin chewable tablet 81 mg  81 mg Oral DAILY    clopidogreL (PLAVIX) tablet 75 mg  75 mg Oral DAILY    famotidine (PEPCID) tablet 20 mg  20 mg Oral QHS    ipratropium-albuterol (COMBIVENT RESPIMAT) 20 mcg-100 mcg inhalation spray  1 Puff Inhalation Q6H PRN    haloperidol lactate (HALDOL) injection 1 mg  1 mg IntraMUSCular Q6H PRN    sodium chloride (NS) flush 5-10 mL  5-10 mL IntraVENous PRN    sodium chloride (NS) flush 5-40 mL  5-40 mL IntraVENous Q8H    sodium chloride (NS) flush 5-40 mL  5-40 mL IntraVENous PRN    acetaminophen (TYLENOL) tablet 650 mg  650 mg Oral Q6H PRN    Or    acetaminophen (TYLENOL) suppository 650 mg  650 mg Rectal Q6H PRN    polyethylene glycol (MIRALAX) packet 17 g  17 g Oral DAILY PRN       Care Plan discussed with: Patient/Family and Nurse    Total time spent with patient: 30 minutes.     Gely Mishra MD

## 2021-09-08 NOTE — PROGRESS NOTES
Bedside shift change report given to Pili (oncoming nurse) by Cynthia Kimbrough (offgoing nurse). Report included the following information SBAR, Kardex, Intake/Output, MAR and Recent Results.

## 2021-09-08 NOTE — PROGRESS NOTES
visited with Mrs. Jordan for a palliative care follow up visit on the [facility]. Mrs. Usman Moura was awake, alert but pleasantly confused, and was lying in bed. Her daughter was present at the bedside.  introduced herself and Mrs. Jordan greeted the  with a bright smile.  engaged in active listening as Mrs. Jordan shared various thoughts with the . She was able to recognize her daughter at the bedside and focused her attention on her daughter most of the time. Mrs. Yazan Andre daughter shared how thankful she was for her the care that her mother has received and spoke of her mother loving and caring nature, engaging in storytelling regarding her mother. 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 Mrs. Usman Moura shared that her salena is very important to her, she is [facility], but seems to have explored other [facility] denominations as well. Mrs. Usman Moura and her daughter thanked the  for stopping by and is aware of the 's availability. 's are available for follow up upon referral  Alejandra Orellana.      Paging Service: [phone])

## 2021-09-08 NOTE — PROGRESS NOTES
9/8/2021  Case Management Progress Note    11:53 AM  Patient is 79year old female admitted 8/26 with severe sepsis. Patient's RUR is 18% green/low risk for readmission  Chart reviewed--patient discussed at IDR rounds this morning. Per MD note and conversations with myself and Moe Neal, MD does not want PT to discharge to rehab given that she is not taking PO. However per palliative patient has been taking her pills by mouth, and though she is not eating much, per Pili BAE she does eat some and is at least asking for ice cream every day. CM  Balta Jones working on discharge planning with myself; per our conversation, MD will not discharge patient without another plan, but there is not much more CM can do for this patient at this time. Palliative also met with patient and family at bedside today (patient's daughter, and they are aware she is not POA). Will continue to follow and assist with discharge planning. Transition of Care Plan   1. Continue medical management  2. Patient has [employer] for the [employer]  3. Patient will be staying for the moment  4. Palliative is seeing   5.  CM will follow    SURJIT Roberts

## 2021-09-08 NOTE — PROGRESS NOTES
Comprehensive Nutrition Assessment    Type and Reason for Visit: Reassess    Nutrition Recommendations/Plan:   1. Modify current diet - RD to liberalize in attempt to increase PO intake. 2. Consider initiation of appetite stimulant. 3. Continue Glucerna BID to aid in meeting kcal/protein needs (440 kcal, 52 g carbs, 20 g protein)   4. Please provide meal assistance to increase intake. 5. If it aligns with family goals, consider gaining nasogastric assess and placing NG tube for enteral nutrition. Please find suggested formula and rate below. Diabetic (Glucerna 1.5 Pankaj) at 45 mL/hour. Begin at 20 mL/hour, advancing by 10 mL/hour q 4 hours until goal rate fo45 mL/hour is reached. FWF 50 mL q 4 hours. At goal, TF provides 1500 kcal (100% needs); 82 g P (100% needs); 133 g carbs. Nutrition Assessment:    9/8: Follow up. Poor PO intake continues. Daughter at bedside at time of visit. Voices that patient will request ice cream but refuses all other foods. Daughter attempted feeding patient, who adamantly refused and pushed food away. At this point, also refusing beverages (water, coke zero, orange juice were offerred). It is unclear whether patient has pain while eating, no appetite, or other barriers to PO intake. If it aligns with family goals of care, enteral nutrition may be an option for providing calories and protein for patient.         Documented Meal intake:  Patient Vitals for the past 168 hrs:   % Diet Eaten   09/07/21 1830 1 - 25%   09/07/21 1340 0%   09/07/21 0859 1 - 25%   09/07/21 0535 0%   09/05/21 0901 0%   09/04/21 1824 0%   09/04/21 1225 0%   09/04/21 0940 0%   09/03/21 1230 0%   09/03/21 0830 0%   09/03/21 0711 0%   09/02/21 1948 0%   09/02/21 1813 0%   09/02/21 1255 1 - 25%   09/02/21 0844 1 - 25%   09/02/21 0358 0%   09/01/21 2003 0%       Documentation of supplement intake:  Patient Vitals for the past 168 hrs:   Supplement intake %   09/07/21 0535 0   09/03/21 0711 0   09/02/21 1948 0 09/02/21 1255 1 - 25%   09/02/21 0358 0   09/01/21 2003 0         Last 3 Recorded Weights in this Encounter    08/31/21 1545 09/04/21 1838 09/05/21 1930   Weight: 73 kg (161 lb) 64.3 kg (141 lb 12.1 oz) 65 kg (143 lb 4.8 oz)         9/3: Follow up. PO intake remains poor, with all meals and supplements. Patient very distracted at meal time with RD today. C/o feeling \"stomach sick\", refusing PO except for ginger ale. 9/1: Pt is an 79year old female admitted with Severe sepsis ([facility] [A41.9, R65.20]. She  has a past medical history of Chronic obstructive pulmonary disease, Diabetes, Diverticulitis, DVT, History of heart artery stent, Hypertension, Hypothyroid, and Renal failure. Patient unable to provide meaningful information at time of visit. Unable to reach listed emergency contacts. Patient had not consumed lunch, showed no interest in food with RD. Wt Readings from Last 10 Encounters:   09/05/21 65 kg (143 lb 4.8 oz)   12/03/18 77.6 kg (171 lb)   02/09/18 77.6 kg (171 lb)   12/20/15 75 kg (165 lb 6 oz)   09/16/15 74.8 kg (165 lb)       Malnutrition Assessment:  Malnutrition Status:  Mild malnutrition    Context:  Acute illness     Findings of the 6 clinical characteristics of malnutrition:   Energy Intake:  Unable to assess  Weight Loss:  No significant weight loass     Body Fat Loss:  1 - Mild body fat loss, Orbital, Buccal region   Muscle Mass Loss:  7 - Severe muscle mass loss, Clavicles (pectoralis &deltoids), Calf (gastrocnemius), Scapula (trapezius), Temples (temporalis)  Fluid Accumulation:  No significant fluid accumulation,     Strength:  Not performed       Estimated Daily Nutrient Needs:  Energy (kcal): 1641-5783 (25-30); Weight Used for Energy Requirements: Current  Protein (g): 65-78;  Weight Used for Protein Requirements: Current  Fluid (ml/day): 5313-5294; Method Used for Fluid Requirements: 1 ml/kcal    Nutrition Related Findings:       ABD: WNL, poor appetite with active bowel sounds 9/5  Last BM: 9/5  Edema: None noted 9/8    Nutr. Labs: All labs from 9/1, no updated information since  Na 145 (WNL)  K 3.4 (L)  BUN 33 (H)  Cl 116 (H)  Lab Results   Component Value Date/Time    Glucose 64 (L) 09/01/2021 05:01 AM    Glucose (POC) 209 (H) 09/07/2021 04:00 PM     Lab Results   Component Value Date/Time    Hemoglobin A1c 7.8 (H) 08/28/2021 06:06 AM       Nutr. Meds:  Norvasc  Plavix  Pepcid  Humalog  Humulin N  Synthroid  Singulair  Metoprolol Succinate  Miralax PRN    Wounds:    None noted 9/3     Current Nutrition Therapies:  ADULT ORAL NUTRITION SUPPLEMENT AM Snack, PM Snack; Diabetic Supplement  ADULT DIET Regular; 4 carb choices (60 gm/meal); No Salt Added (3-4 gm)    Anthropometric Measures:  · Height:  5' 2.01\" (157.5 cm)  · Current Body Wt:  65 kg (143 lb 4.8 oz)   · Ideal Body Wt:  110 lbs:  130.3 %   · BMI:  29.44  · BMI Category: Overweight (BMI 25.0-29. 9)       Nutrition Diagnosis:   · Mild malnutrition related to cognitive or neurological impairment, inadequate protein-energy intake as evidenced by intake 0-25%, weight loss    Nutrition Interventions:   Food and/or Nutrient Delivery: Modify current diet, Continue oral nutrition supplement  Nutrition Education and Counseling: Education not appropriate  Coordination of Nutrition Care: Continue to monitor while inpatient, Interdisciplinary rounds    Goals:  PO intake >/= 50% estimated protein needs within 2-3 days vs NG tube placed       Nutrition Monitoring and Evaluation:   Behavioral-Environmental Outcomes: None identified  Food/Nutrient Intake Outcomes: Food and nutrient intake, Supplement intake  Physical Signs/Symptoms Outcomes: Biochemical data, Weight    Discharge Planning:     Too soon to determine     Electronically signed by Dara Rodrigez RD on 9/9/6051  Contact: [phone] or via [employer]

## 2021-09-08 NOTE — PROGRESS NOTES
This RN contacted Mathew Parker MD on behalf of case management regarding patient's dispo. Patient has received auth for rehab. POA is agreeable. However, MD states that patient is high risk of readmission if discharge to rehab facility.

## 2021-09-08 NOTE — PROGRESS NOTES
Patient reports relief from chest pain and verbalized \"It's gone\"    Refused blood draw and GI cocktail and PM meds despite encouragements by me and family    No pain. No distress. Respirations even and unlabored. Dr. Rubén Baker notified.

## 2021-09-08 NOTE — PROGRESS NOTES
Patient c/o chest pain    No distress. Respirations even and unlabored. VS and EKG done    Spoke with Dr. Chloé Albarado with order of Troponin x1 and GI cocktail.  Read back and verified,

## 2021-09-09 LAB
ANION GAP SERPL CALC-SCNC: 5 MMOL/L (ref 5–15)
BASOPHILS # BLD: 0 K/UL (ref 0–0.1)
BASOPHILS NFR BLD: 0 % (ref 0–1)
BUN SERPL-MCNC: 56 MG/DL (ref 6–20)
BUN/CREAT SERPL: 36 (ref 12–20)
CALCIUM SERPL-MCNC: 11.5 MG/DL (ref 8.5–10.1)
CHLORIDE SERPL-SCNC: 110 MMOL/L (ref 97–108)
CO2 SERPL-SCNC: 25 MMOL/L (ref 21–32)
CREAT SERPL-MCNC: 1.55 MG/DL (ref 0.55–1.02)
DIFFERENTIAL METHOD BLD: ABNORMAL
EOSINOPHIL # BLD: 0.2 K/UL (ref 0–0.4)
EOSINOPHIL NFR BLD: 2 % (ref 0–7)
ERYTHROCYTE [DISTWIDTH] IN BLOOD BY AUTOMATED COUNT: 14.3 % (ref 11.5–14.5)
GLUCOSE SERPL-MCNC: 181 MG/DL (ref 65–100)
HCT VFR BLD AUTO: 42.1 % (ref 35–47)
HGB BLD-MCNC: 13.6 G/DL (ref 11.5–16)
IMM GRANULOCYTES # BLD AUTO: 0 K/UL (ref 0–0.04)
IMM GRANULOCYTES NFR BLD AUTO: 0 % (ref 0–0.5)
LYMPHOCYTES # BLD: 1.4 K/UL (ref 0.8–3.5)
LYMPHOCYTES NFR BLD: 15 % (ref 12–49)
MCH RBC QN AUTO: 29.1 PG (ref 26–34)
MCHC RBC AUTO-ENTMCNC: 32.3 G/DL (ref 30–36.5)
MCV RBC AUTO: 90 FL (ref 80–99)
MONOCYTES # BLD: 0.6 K/UL (ref 0–1)
MONOCYTES NFR BLD: 7 % (ref 5–13)
NEUTS SEG # BLD: 7.1 K/UL (ref 1.8–8)
NEUTS SEG NFR BLD: 76 % (ref 32–75)
NRBC # BLD: 0 K/UL (ref 0–0.01)
NRBC BLD-RTO: 0 PER 100 WBC
PLATELET # BLD AUTO: 109 K/UL (ref 150–400)
POTASSIUM SERPL-SCNC: 3.9 MMOL/L (ref 3.5–5.1)
RBC # BLD AUTO: 4.68 M/UL (ref 3.8–5.2)
SODIUM SERPL-SCNC: 140 MMOL/L (ref 136–145)
WBC # BLD AUTO: 9.4 K/UL (ref 3.6–11)

## 2021-09-09 PROCEDURE — 74011250637 HC RX REV CODE- 250/637: Performed by: INTERNAL MEDICINE

## 2021-09-09 PROCEDURE — 97530 THERAPEUTIC ACTIVITIES: CPT

## 2021-09-09 PROCEDURE — 85025 COMPLETE CBC W/AUTO DIFF WBC: CPT

## 2021-09-09 PROCEDURE — 74011250637 HC RX REV CODE- 250/637: Performed by: FAMILY MEDICINE

## 2021-09-09 PROCEDURE — 80048 BASIC METABOLIC PNL TOTAL CA: CPT

## 2021-09-09 PROCEDURE — 94760 N-INVAS EAR/PLS OXIMETRY 1: CPT

## 2021-09-09 PROCEDURE — 65270000029 HC RM PRIVATE

## 2021-09-09 PROCEDURE — 36415 COLL VENOUS BLD VENIPUNCTURE: CPT

## 2021-09-09 RX ADMIN — AMLODIPINE BESYLATE 10 MG: 5 TABLET ORAL at 10:00

## 2021-09-09 RX ADMIN — CLOPIDOGREL BISULFATE 75 MG: 75 TABLET, FILM COATED ORAL at 10:00

## 2021-09-09 RX ADMIN — METOPROLOL TARTRATE 12.5 MG: 25 TABLET, FILM COATED ORAL at 10:00

## 2021-09-09 RX ADMIN — METOPROLOL TARTRATE 12.5 MG: 25 TABLET, FILM COATED ORAL at 21:41

## 2021-09-09 RX ADMIN — QUETIAPINE FUMARATE 50 MG: 100 TABLET ORAL at 10:00

## 2021-09-09 RX ADMIN — QUETIAPINE FUMARATE 50 MG: 100 TABLET ORAL at 17:14

## 2021-09-09 RX ADMIN — Medication 10 ML: at 07:02

## 2021-09-09 RX ADMIN — FAMOTIDINE 20 MG: 20 TABLET ORAL at 21:41

## 2021-09-09 RX ADMIN — ASPIRIN 81 MG: 81 TABLET, CHEWABLE ORAL at 10:00

## 2021-09-09 RX ADMIN — Medication 10 ML: at 21:48

## 2021-09-09 NOTE — ROUTINE PROCESS
Bedside and Verbal shift change report given to KATHIA Nunez (oncoming nurse) by Ijeoma Calles (offgoing nurse). Report included the following information SBAR and Kardex.

## 2021-09-09 NOTE — PROGRESS NOTES
Problem: Falls - Risk of  Goal: *Absence of Falls  Outcome: Progressing Towards Goal  Note: Fall Risk Interventions:  Mobility Interventions: Bed/chair exit alarm, Communicate number of staff needed for ambulation/transfer    Mentation Interventions: Adequate sleep, hydration, pain control, Bed/chair exit alarm, Door open when patient unattended    Medication Interventions: Bed/chair exit alarm    Elimination Interventions: Bed/chair exit alarm, Call light in reach              Problem: Pressure Injury - Risk of  Goal: *Prevention of pressure injury  Outcome: Progressing Towards Goal  Note: Pressure Injury Interventions:  Sensory Interventions: Assess changes in LOC    Moisture Interventions: Absorbent underpads, Maintain skin hydration (lotion/cream), Minimize layers, Moisture barrier    Activity Interventions: Pressure redistribution bed/mattress(bed type)    Mobility Interventions: HOB 30 degrees or less, Pressure redistribution bed/mattress (bed type)    Nutrition Interventions: Document food/fluid/supplement intake    Friction and Shear Interventions: HOB 30 degrees or less, Minimize layers                Problem: Patient Education: Go to Patient Education Activity  Goal: Patient/Family Education  Outcome: Progressing Towards Goal     Problem: Patient Education: Go to Patient Education Activity  Goal: Patient/Family Education  Outcome: Progressing Towards Goal     Problem: Pressure Injury - Risk of  Goal: *Prevention of pressure injury  Outcome: Progressing Towards Goal  Note: Pressure Injury Interventions:  Sensory Interventions: Assess changes in LOC    Moisture Interventions: Absorbent underpads, Maintain skin hydration (lotion/cream), Minimize layers, Moisture barrier    Activity Interventions: Pressure redistribution bed/mattress(bed type)    Mobility Interventions: HOB 30 degrees or less, Pressure redistribution bed/mattress (bed type)    Nutrition Interventions: Document food/fluid/supplement intake    Friction and Shear Interventions: HOB 30 degrees or less, Minimize layers                Problem: Pressure Injury - Risk of  Goal: *Prevention of pressure injury  Outcome: Progressing Towards Goal  Note: Pressure Injury Interventions:  Sensory Interventions: Assess changes in LOC    Moisture Interventions: Absorbent underpads, Maintain skin hydration (lotion/cream), Minimize layers, Moisture barrier    Activity Interventions: Pressure redistribution bed/mattress(bed type)    Mobility Interventions: HOB 30 degrees or less, Pressure redistribution bed/mattress (bed type)    Nutrition Interventions: Document food/fluid/supplement intake    Friction and Shear Interventions: HOB 30 degrees or less, Minimize layers                Problem: Patient Education: Go to Patient Education Activity  Goal: Patient/Family Education  Outcome: Progressing Towards Goal

## 2021-09-09 NOTE — PROGRESS NOTES
Problem: Self Care Deficits Care Plan (Adult)  Goal: *Acute Goals and Plan of Care (Insert Text)  Description:   FUNCTIONAL STATUS PRIOR TO ADMISSION: Patient not able to give history and no family present. Per chart patient lives with niece, but was ambulatory without assistance. HOME SUPPORT: The patient lived with family. Occupational Therapy Goals  Weekly re assessment - 9/7/2021- goals remain appropriate for patient. Will continue x 1 week. Initiated 8/30/2021  1. Patient will perform self-feeding with supervision/set-up within 7 day(s). 2.  Patient will perform grooming with supervision/set-up within 7 day(s). 3.  Patient will perform toilet transfers with supervision/set-up within 7 day(s). 4.  Patient will perform all aspects of toileting with supervision/set-up within 7 day(s). Outcome: Progressing Towards Goal   OCCUPATIONAL THERAPY TREATMENT  Patient: Kati Finney (37 y.o. female)  Date: 9/9/2021  Diagnosis: Severe sepsis ([facility] [A41.9, R65.20] <principal problem not specified>       Precautions:    Chart, occupational therapy assessment, plan of care, and goals were reviewed. ASSESSMENT  Patient continues with skilled OT services and is progressing towards goals. Patient received supine in bed. Patient sleeping. Patient arouses to touch and voice but only briefly. She does grimace at bright light with therapist attempt to awaken however she quickly returns to sleeping. Patient noted low in bed and LEs positioned poorly. Patient does not object to rolling to allow management of linens and repositioning. Patient left in NAD, sleeping. Patient offered minimal participation today secondary to current state of drowsiness. Current Level of Function Impacting Discharge (ADLs):     Other factors to consider for discharge:          PLAN :  Patient continues to benefit from skilled intervention to address the above impairments.   Continue treatment per established plan of care to address goals. Recommend with staff: ADLs as able    Recommend next OT session: continue goals     Recommendation for discharge: (in order for the patient to meet his/her long term goals)  Therapy up to 5 days/week in SNF setting    This discharge recommendation:  Has been made in collaboration with the attending provider and/or case management    IF patient discharges home will need the following DME: TBD       SUBJECTIVE:   Patient stated .    OBJECTIVE DATA SUMMARY:   Cognitive/Behavioral Status:  Neurologic State: Drowsy; Confused  Orientation Level: Oriented to person;Disoriented to time;Disoriented to situation;Disoriented to place  Cognition: Decreased attention/concentration             Functional Mobility and Transfers for ADLs:  Bed Mobility:  Rolling: Moderate assistance    Transfers:             Balance:       ADL Intervention:                                          Therapeutic Exercises:       Pain:      Activity Tolerance:   Poor    After treatment patient left in no apparent distress:   Supine in bed, Call bell within reach, Bed / chair alarm activated, and Side rails x 3    COMMUNICATION/COLLABORATION:   The patients plan of care was discussed with: Physical therapist and Registered nurse.      Shana Lee OTR/L  Time Calculation: 16 mins

## 2021-09-09 NOTE — PROGRESS NOTES
Problem: Mobility Impaired (Adult and Pediatric)  Goal: *Acute Goals and Plan of Care (Insert Text)  Description: FUNCTIONAL STATUS PRIOR TO ADMISSION: Patient was modified independent using a rolling walker for functional mobility. HOME SUPPORT PRIOR TO ADMISSION: The patient lived with daughter but did not require assist.    Physical Therapy Goals  Initiated 8/30/2021  1. Patient will move from supine to sit and sit to supine , scoot up and down, and roll side to side in bed with independence within 7 day(s). 2.  Patient will transfer from bed to chair and chair to bed with modified independence using the least restrictive device within 7 day(s). 3.  Patient will perform sit to stand with modified independence within 7 day(s). 4.  Patient will ambulate with modified independence for 200 feet with the least restrictive device within 7 day(s). Outcome: Progressing Towards Goal   PHYSICAL THERAPY TREATMENT  Patient: Soriada Suarez (60 y.o. female)  Date: 9/9/2021  Diagnosis: Severe sepsis ([facility] [A41.9, R65.20] <principal problem not specified>       Precautions:    Chart, physical therapy assessment, plan of care and goals were reviewed. ASSESSMENT  Patient continues with skilled PT services and is slowly progressing towards goals. Patient this afternoon received asleep in bed, difficult to arouse but agreeable to repositioning for comfort. She offers poor participation today secondary to drowsiness. She assists with rolling to L & R with modA for better positioning and lifts R leg with active assist from PT. Patient quickly returns to sleeping once better positioned in bed. She is not verbal during session but answers by nodding or shaking her head to yes or no questions.      Current Level of Function Impacting Discharge (mobility/balance): fatigue; modA rolling     Other factors to consider for discharge: family looking in to SNF/hospice/LTC per chart          PLAN :  Patient continues to benefit from skilled intervention to address the above impairments. Continue treatment per established plan of care. to address goals. Recommendation for discharge: (in order for the patient to meet his/her long term goals)  Therapy up to 5 days/week in SNF setting    This discharge recommendation:  Has not yet been discussed the attending provider and/or case management    IF patient discharges home will need the following DME: to be determined (TBD)       SUBJECTIVE:   Patient nodded when asked if therapists could help reposition her     OBJECTIVE DATA SUMMARY:   Patient received sidelying in bed asleep; cleared by RN to participate. Critical Behavior:  Neurologic State: Drowsy, Confused  Orientation Level: Oriented to person, Disoriented to time, Disoriented to situation, Disoriented to place  Cognition: Decreased attention/concentration  Safety/Judgement: Decreased awareness of environment  Functional Mobility Training:  Bed Mobility:  Rolling: Moderate assistance                 Transfers:  Pain Rating:  Patient without complaints of pain    Activity Tolerance:   Poor and fatigued    After treatment patient left in no apparent distress:   Call bell within reach, Bed / chair alarm activated, Side rails x 3, and sidelying on L    COMMUNICATION/COLLABORATION:   The patients plan of care was discussed with: Occupational therapist and Registered nurse.      Maryuri Stahl PT, DPT   Time Calculation: 8 mins

## 2021-09-09 NOTE — PROGRESS NOTES
Daily Progress Note: 9/9/2021  Mahsa Link, Kemp Leventhal, MD    Assessment/Plan:   Severe sepsis / septic shock: 2/2 PNA. -Resolved  -CXR showed patchy bilateral airspace disease, repeat 8/31 was improving  -Check RVP/COVID-PCR. - Negative  -Completed cefepime and Doxy  -BC neg  -sputum culture couldn't be done, PNA workup (Legionella and Strep Pneumo urine ag, Mycoplasma IgM) all negative     Chronic obstructive pulmonary disease: no wheezing.   -Combivent PRN     Hypertension:   -Restarted Norvasc and metoprolol (8/28)  -Increased Norvasc to 10 q day (9/2)  -not taking medication reliably, did take PO meds yesterday for the first time in 6 days  -BP better after taking meds     Hypothyroid:   -check TSH. Continue LT4     CAD (coronary artery disease) / History of heart artery stent:   -cont DAPT, statin, taking intermittently     Diabetes: type 2, associated w/ CKD4.   -not great control, A1c 7.8, tighter control would be high risk, however  -On determir at home.   -Use NPH with SSI here     CKD (chronic kidney disease) stage 4, GFR 15-29 ml/min: stable. -recheck BMP in AM if she will cooperate     Acute metabolic encephalopathy 2/2 sepsis.   -Has dementia and is at risk for delirium  -Avoid benzos, opioids, anticholinergics.    -Verbally re-direct whenever possible.   -Avoid chemical restraints unless pt is a danger to self or others.  -Haldol as needed  -Wrist restraints as needed when family not present  -Home Seroquel restarted    Dementia  -she has had significant cognitive decline with this illness and hospitalization  -daughter was here at bedside all weekend, she was apprised of patient's condition including ongoing decline making her inappropriate for rehab  -Palliative Care following, niece (mPOA) reportedly unable to take care of patient in her home  -has taken meds 2 out of the last 8 days; she is not eating much and is drinking only small amounts  -CM, hospice and Palliative Care will need to work on alternate plans for discharge      Thrombocytopenia: chronic, stable. -Follow PLT     Code Status: DNR     Problem List:  Problem List as of 9/9/2021 Date Reviewed: 8/26/2021        Codes Class Noted - Resolved    Thrombocytopenia ([facility] ICD-10-CM: D69.6  ICD-9-CM: 287.5  8/27/2021 - Present        Chronic obstructive pulmonary disease (HCC) ICD-10-CM: J44.9  ICD-9-CM: 80  Unknown - Present        Hypertension ICD-10-CM: I10  ICD-9-CM: 401.9  Unknown - Present        Hypothyroid ICD-10-CM: E03.9  ICD-9-CM: 244.9  Unknown - Present        CAD (coronary artery disease) ICD-10-CM: I25.10  ICD-9-CM: 414.00  Unknown - Present        History of heart artery stent ICD-10-CM: Z95.5  ICD-9-CM: V45.82  Unknown - Present        Diabetes ([facility] ICD-10-CM: E11.9  ICD-9-CM: 250.00  Unknown - Present        CKD (chronic kidney disease) stage 4, GFR 15-29 ml/min ([facility] ICD-10-CM: N18.4  ICD-9-CM: 323. 4  Unknown - Present        Severe sepsis (HCC) ICD-10-CM: A41.9, R65.20  ICD-9-CM: 038.9, 995.92  Unknown - Present        Pneumonia ICD-10-CM: J18.9  ICD-9-CM: 539  Unknown - Present        Dementia ([facility] ICD-10-CM: F03.90  ICD-9-CM: 294.20  8/26/2021 - Present        Acute metabolic encephalopathy DPH-42-FR: G93.41  ICD-9-CM: 348.31  8/26/2021 - Present              Subjective:    79 y.o. female w/ hx of CAD, HTN, DM, CKD4, dementia presenting w/ fever, weakness. Started 1 day PTA, febrile at home, associated with AMS and lethargy per family. Pt also c/o chest pain and cough. ED workup showed 3/4 SIRS with bilateral PNA evident on CXR. Ms. Jude Dickinson is admitted for further evaluation and management. (Dr Silvino Cole)    8/27: She is confused this am. Not able to provide any history and family not present. Off pressors. BP stable. 8/28: Confused. Has been more agitated during the night requiring restraints. Change antibiotic to Doxy so that we could use Haldol. BP is higher so will restart meds.  Ask case management to see.     8/29: Daughter at bedside. She is less confused this am. A little more talkative. Family willing to look at rehab as she needs 24 hour care. Discussed with daughter that she should not be left alone. Her daughter lives out of town and works so her niece has been caring for her. 8/30:Sleeping but arouses. Nurses report she had a good night. WBC and Cr have improved. Afebrile. Case management following.     8/31: Pleasantly confused. Appetite has improved. Case management following for placement. Will continue IV antibiotics. Recheck CXR.     9/1: Family looking for SNF/Long term placement. Case management following. WBC has improved. CXR improving. Will continue IV antibiotics for now. 9/2: CM waiting to hear back from [facility] SNF/LTC for bed availability. Pt seen by PT/OT yesterday, did not tolerate well, max assist from laying to sit. Total care for ADL's. Patient on cefepime and doxy. WBC 11.9 (9/1). Patient refused labs this morning as well as her cefepime. Will try to get labs drawn this afternoon and administer cefepime if patient is more cooperative. Increased Norvasc due to HTN    9/3: More agitated and refusing blood draws. Poor appetite. IVF resumed. Family looking for placement. 9/4: Daughter at the bedside all night. Patient very confused overnight, refusing vitals and labs (only fingersticks for the past 72+ hours). Actually did really well with PT yesterday. 9/5: Lost IV acces overnight, not taking in much of anything by mouth, suspect she will continue to decline, will plan to consult Palliative on Tuesday. Daughter at bedside and agrees with this plan, she seems reasonable about her mother's decline; home with hospice is a definite option given her overall decline. 9/6: Continues to take in minimal PO, did take some ginger ale overnight. Does not have an IV.   Patient is minimally verbal, family not at the bedside this AM. She denies any complaints. :  Lethargic this AM, makes minimal verbal response, oriented to name only, not voicing any complaints    :  Daughter at bedside again this AM, patient did take pills yesterday, not drinking or eating any better. Patient isn't really making any verbal responses but not voicing any complaints. :  Remains confused and minimally verbal this AM, no family at bedside. Only complaint is \"I am cold. \"  Not eating or drinking much, did not take Seroquel last night.       Review of Systems:   Review of systems not obtained due to patient factors. Objective:   Physical Exam:     Visit Vitals  BP (!) 157/81 (BP 1 Location: Left upper arm, BP Patient Position: At rest)   Pulse 66   Temp 97.4 °F (36.3 °C)   Resp 16   Ht 5' 2.01\" (1.575 m)   Wt 65.1 kg (143 lb 8 oz)   SpO2 96%   BMI 26.24 kg/m²    O2 Flow Rate (L/min): 2 l/min O2 Device: None (Room air)  Temp (24hrs), Av.5 °F (36.4 °C), Min:97 °F (36.1 °C), Max:98 °F (36.7 °C)    No intake/output data recorded.  07 -  1900  In: 700 [P.O.:700]  Out: -   General:  Confused,  no distress, frail appearing. Head:  Normocephalic, without obvious abnormality, atraumatic. Eyes:  Conjunctivae/corneas clear   Nose: Nares normal. Septum midline. Mucosa normal. No drainage or sinus tenderness. Throat: Lips, mucosa, and tongue moist..   Neck: Supple, symmetrical, trachea midline, no adenopathy, thyroid: no enlargement/tenderness/nodules, no carotid bruit and no JVD. Back:   Symmetric, no curvature. ROM normal. No CVA tenderness. Lungs:   Clear to auscultation bilaterally. Chest wall:  No tenderness or deformity. Heart:  Regular rate and rhythm, S1, S2 normal, no murmur, click, rub or gallop. Abdomen:   Soft, non-tender. Bowel sounds normal. No masses,  No organomegaly. Extremities: no cyanosis or edema. No calf tenderness or cords. Pulses: 2+ and symmetric all extremities.    Skin: Skin color, texture, turgor normal. No rashes or lesions   Neurologic: CNII-XII intact. Alert and oriented X 1. Fine motor of hands and fingers normal.   equal.  No cogwheeling or rigidity. Gait not tested at this time. Sensation grossly normal to touch. Gross motor of extremities normal.       Data Review:    Port CXR 8/27/21   INDICATION: fever, cough  EXAM:  AP CHEST RADIOGRAPH  COMPARISON: December 4, 2018  FINDINGS:  AP portable view of the chest demonstrates right subclavian pacemaker. Heart  size is normal. Patchy airspace disease throughout the right lung and left lung  base. No pneumothorax. The osseous structures are unremarkable. IMPRESSION  Patchy bilateral airspace disease. Portable CXR 8/28/21  IMPRESSION  Improved aeration the right midlung and lower lung zones with  residual patchy airspace opacity. Small left pleural effusion. CXR 8/31/21  IMPRESSION  Improved aeration the right lung with minimal residual patchy  airspace opacity in the right midlung zone. .    Interpretation Summary- ECHO 8/31/21    · LV: Estimated LVEF is 55 - 60%. Normal cavity size and systolic function (ejection fraction normal). Upper normal wall thickness. Wall motion: normal. Mild (grade 1) left ventricular diastolic dysfunction. · Image quality for this study was technically difficult. · MV: Moderate mitral annular calcification. Mild mitral valve regurgitation is present. · LA: Mildly dilated left atrium. · AV: Mild aortic valve stenosis is present. · RV: Pacing wire present       Recent Days:  No results for input(s): WBC, HGB, HCT, PLT, HGBEXT, HCTEXT, PLTEXT, HGBEXT, HCTEXT, PLTEXT in the last 72 hours. No results for input(s): NA, K, CL, CO2, GLU, BUN, CREA, CA, MG, PHOS, ALB, TBIL, TBILI, ALT, INR, INREXT, INREXT in the last 72 hours. No lab exists for component: SGOT  No results for input(s): PH, PCO2, PO2, HCO3, FIO2 in the last 72 hours.     24 Hour Results:  No results found for this or any previous visit (from the past 24 hour(s)). Medications reviewed  Current Facility-Administered Medications   Medication Dose Route Frequency    mylanta/viscous lidocaine (GI COCKTAIL)  40 mL Oral ONCE    metoprolol tartrate (LOPRESSOR) tablet 12.5 mg  12.5 mg Oral Q12H    QUEtiapine (SEROquel) tablet 50 mg  50 mg Oral BID    amLODIPine (NORVASC) tablet 10 mg  10 mg Oral DAILY    hydrALAZINE (APRESOLINE) 20 mg/mL injection 10 mg  10 mg IntraVENous Q6H PRN    ondansetron (ZOFRAN) injection 4 mg  4 mg IntraVENous Q6H PRN    aspirin chewable tablet 81 mg  81 mg Oral DAILY    clopidogreL (PLAVIX) tablet 75 mg  75 mg Oral DAILY    famotidine (PEPCID) tablet 20 mg  20 mg Oral QHS    ipratropium-albuterol (COMBIVENT RESPIMAT) 20 mcg-100 mcg inhalation spray  1 Puff Inhalation Q6H PRN    haloperidol lactate (HALDOL) injection 1 mg  1 mg IntraMUSCular Q6H PRN    sodium chloride (NS) flush 5-10 mL  5-10 mL IntraVENous PRN    sodium chloride (NS) flush 5-40 mL  5-40 mL IntraVENous Q8H    sodium chloride (NS) flush 5-40 mL  5-40 mL IntraVENous PRN    acetaminophen (TYLENOL) tablet 650 mg  650 mg Oral Q6H PRN    Or    acetaminophen (TYLENOL) suppository 650 mg  650 mg Rectal Q6H PRN    polyethylene glycol (MIRALAX) packet 17 g  17 g Oral DAILY PRN       Care Plan discussed with: Patient/Family and Nurse    Total time spent with patient: 30 minutes.     Poncho Pina MD

## 2021-09-09 NOTE — PROGRESS NOTES
9/9/2021  Case Management Progress Note    12:24 PM  Patient is 79year old female admitted 8/26 with severe sepsis. Patient's RUR is 22% moderate/high risk for readmission  Chart reviewed--patient discussed at IDR rounds this morning. Spoke with CM Manager DEVAUGHN Billings regarding this patient. Will double check financial resources of family today. Also spoke with Aniceto from the [address] [place], [place] [address]ismael Gupta [facility] where patient is accepted--she will look into a couple of things for me including cost of long term care/whether Medicaid may be able to reimburse family if they could pay for a month of long term care so that I can present this as an option to family during discussion. Will continue to follow and update. Transition of Care Plan   1. Continue medical management/treatment  2. Will discuss options for hospice with patient's family today   3. Await reply from [facility] for options to give the family  4. She will also renew insurance auth in case we need it   5. Will need [facility] transport, on will call.    6. CM will follow    Milissa Pallas, MSW

## 2021-09-10 LAB
ATRIAL RATE: 76 BPM
CALCULATED R AXIS, ECG10: -81 DEGREES
CALCULATED T AXIS, ECG11: 83 DEGREES
DIAGNOSIS, 93000: NORMAL
P-R INTERVAL, ECG05: 206 MS
Q-T INTERVAL, ECG07: 480 MS
QRS DURATION, ECG06: 168 MS
QTC CALCULATION (BEZET), ECG08: 540 MS
VENTRICULAR RATE, ECG03: 76 BPM

## 2021-09-10 PROCEDURE — 74011250637 HC RX REV CODE- 250/637: Performed by: INTERNAL MEDICINE

## 2021-09-10 PROCEDURE — 94760 N-INVAS EAR/PLS OXIMETRY 1: CPT

## 2021-09-10 PROCEDURE — 65270000029 HC RM PRIVATE

## 2021-09-10 PROCEDURE — 97530 THERAPEUTIC ACTIVITIES: CPT

## 2021-09-10 PROCEDURE — 97116 GAIT TRAINING THERAPY: CPT

## 2021-09-10 PROCEDURE — 74011250637 HC RX REV CODE- 250/637: Performed by: FAMILY MEDICINE

## 2021-09-10 RX ADMIN — METOPROLOL TARTRATE 12.5 MG: 25 TABLET, FILM COATED ORAL at 08:44

## 2021-09-10 RX ADMIN — CLOPIDOGREL BISULFATE 75 MG: 75 TABLET, FILM COATED ORAL at 08:46

## 2021-09-10 RX ADMIN — Medication 10 ML: at 21:43

## 2021-09-10 RX ADMIN — QUETIAPINE FUMARATE 50 MG: 100 TABLET ORAL at 08:41

## 2021-09-10 RX ADMIN — Medication 10 ML: at 06:51

## 2021-09-10 RX ADMIN — ASPIRIN 81 MG: 81 TABLET, CHEWABLE ORAL at 08:44

## 2021-09-10 RX ADMIN — Medication 10 ML: at 16:16

## 2021-09-10 RX ADMIN — AMLODIPINE BESYLATE 10 MG: 5 TABLET ORAL at 08:43

## 2021-09-10 RX ADMIN — QUETIAPINE FUMARATE 50 MG: 100 TABLET ORAL at 18:00

## 2021-09-10 NOTE — PROGRESS NOTES
Comprehensive Nutrition Assessment    Type and Reason for Visit: Reassess    Nutrition Recommendations/Plan:   1. Continue current diet order. 2. Please provide meal assistance to increase intake. Nutrition Assessment:    9/10: Follow up. Per rounds this morning, patient will be admitted to hospice in the next few days. No further nutrition intervention is indicated at this time. Please provide food PO for comfort measures as patient accepts. Patient has completely stopped drinking Glucernas - RD to d/c.     9/8: Follow up. Poor PO intake continues. Daughter at bedside at time of visit. Voices that patient will request ice cream but refuses all other foods. Daughter attempted feeding patient, who adamantly refused and pushed food away. At this point, also refusing beverages (water, coke zero, orange juice were offerred). It is unclear whether patient has pain while eating, no appetite, or other barriers to PO intake. If it aligns with family goals of care, enteral nutrition may be an option for providing calories and protein for patient. Documented Meal intake:  Patient Vitals for the past 168 hrs:   % Diet Eaten   09/09/21 1754 0%   09/09/21 1400 0%   09/07/21 1830 1 - 25%   09/07/21 1340 0%   09/07/21 0859 1 - 25%   09/07/21 0535 0%   09/05/21 0901 0%   09/04/21 1824 0%   09/04/21 1225 0%   09/04/21 0940 0%   09/03/21 1230 0%     Documentation of supplement intake:  Patient Vitals for the past 168 hrs:   Supplement intake %   09/07/21 0535 0       Last 3 Recorded Weights in this Encounter    09/05/21 1930 09/08/21 1813 09/09/21 1922   Weight: 65 kg (143 lb 4.8 oz) 65.1 kg (143 lb 8 oz) 62.1 kg (136 lb 14.5 oz)       9/3: Follow up. PO intake remains poor, with all meals and supplements. Patient very distracted at meal time with RD today. C/o feeling \"stomach sick\", refusing PO except for ginger ale. 9/1: Pt is an 79year old female admitted with Severe sepsis ([facility] [address] [A41.9, R65.20].  She  has a past medical history of Chronic obstructive pulmonary disease, Diabetes, Diverticulitis, DVT, History of heart artery stent, Hypertension, Hypothyroid, and Renal failure. Patient unable to provide meaningful information at time of visit. Unable to reach listed emergency contacts. Patient had not consumed lunch, showed no interest in food with RD. Wt Readings from Last 10 Encounters:   09/09/21 62.1 kg (136 lb 14.5 oz)   12/03/18 77.6 kg (171 lb)   02/09/18 77.6 kg (171 lb)   12/20/15 75 kg (165 lb 6 oz)   09/16/15 74.8 kg (165 lb)       Malnutrition Assessment:  Malnutrition Status:  Mild malnutrition    Context:  Acute illness     Findings of the 6 clinical characteristics of malnutrition:   Energy Intake:  Unable to assess  Weight Loss:  No significant weight loass     Body Fat Loss:  1 - Mild body fat loss, Orbital, Buccal region   Muscle Mass Loss:  7 - Severe muscle mass loss, Clavicles (pectoralis &deltoids), Calf (gastrocnemius), Scapula (trapezius), Temples (temporalis)  Fluid Accumulation:  No significant fluid accumulation,     Strength:  Not performed     Estimated Daily Nutrient Needs:  Energy (kcal): 8184-9266 (25-30); Weight Used for Energy Requirements: Current  Protein (g): 65-78; Weight Used for Protein Requirements: Current  Fluid (ml/day): 2115-6388; Method Used for Fluid Requirements: 1 ml/kcal    Nutrition Related Findings:       ABD: Yellow LRQ bruising, hypoactive bowel sounds 9/10  Last BM: 9/5  Edema: None noted 9/10    Nutr. Labs:   Cl 110 (H)  BUN 56 (H)  Creat 1.55 (H)    Lab Results   Component Value Date/Time    Glucose 181 (H) 09/09/2021 06:46 AM    Glucose (POC) 209 (H) 09/07/2021 04:00 PM       Nutr.  Meds:  Norvasc  Plavix  Pepcid  Lopressor  Miralax PRN    Wounds:    None noted 9/10    Current Nutrition Therapies:  ADULT ORAL NUTRITION SUPPLEMENT AM Snack, PM Snack; Diabetic Supplement  ADULT DIET Regular; 5 carb choices (75 gm/meal)    Anthropometric Measures:  · Height:  5' 2.01\" (157.5 cm)  · Current Body Wt:  62.1 kg (136 lb 14.5 oz)   · Ideal Body Wt:  110 lbs:  130.3 %   · BMI:  29.44  · BMI Category: Overweight (BMI 25.0-29. 9)       Nutrition Diagnosis:   · Mild malnutrition related to cognitive or neurological impairment, inadequate protein-energy intake as evidenced by intake 0-25%, weight loss    Nutrition Interventions:   Food and/or Nutrient Delivery: Continue current diet, Discontinue oral nutrition supplement  Nutrition Education and Counseling: No recommendations at this time  Coordination of Nutrition Care: Continue to monitor while inpatient    Goals:  Comfort care measures       Nutrition Monitoring and Evaluation:   Behavioral-Environmental Outcomes: None identified  Food/Nutrient Intake Outcomes: Food and nutrient intake  Physical Signs/Symptoms Outcomes: Weight    Discharge Planning:    No discharge needs at this time     Electronically signed by Freedom Gallegos RD on 0/11/1207  Contact: [phone] or via [employer]

## 2021-09-10 NOTE — PROGRESS NOTES
Physical Therapy note:    Chart reviewed. Visited patient's room at 12:01 with patient soundly asleep. In light of poor participation at yesterday's PT session due to fatigue, will defer and re-attempt this afternoon as able.     Thank you,  Geovanna Emerson, PT, DPT

## 2021-09-10 NOTE — PROGRESS NOTES
9/10/2021  Case Management Progress Note    11:49 AM  Patient is 79year old female admitted 8/26 with severe sepsis. Patient's RUR is 22% moderate/high risk for readmission  Chart reviewed--patient discussed at IDR rounds this morning.   Spoke with patient's family and Naeem Gonzalez with [facility] hospice. They have pulled together family supports and will be able to take the patient home with hospice. [facility] will deliver equipment this evening and patient will discharge home with hospice tomorrow morning. [facility] arranged for 9am. MD aware of discharge in the morning. Will continue to follow. Transition of Care Plan   1. Continue medical management  2. Discharge home with [facility] tomorrow   3. [facility] 9am  4.  CM will follow    SURJIT Avery

## 2021-09-10 NOTE — PROGRESS NOTES
Problem: Risk for Spread of Infection  Goal: Prevent transmission of infectious organism to others  Outcome: Progressing Towards Goal     Problem: Falls - Risk of  Goal: *Absence of Falls  Outcome: Progressing Towards Goal  Note: Fall Risk Interventions:  Mobility Interventions: Bed/chair exit alarm, Communicate number of staff needed for ambulation/transfer, Patient to call before getting OOB    Mentation Interventions: Adequate sleep, hydration, pain control, Bed/chair exit alarm, Door open when patient unattended, More frequent rounding, Reorient patient    Medication Interventions: Bed/chair exit alarm    Elimination Interventions: Bed/chair exit alarm, Call light in reach              Problem: Patient Education:  Go to Education Activity  Goal: Patient/Family Education  Outcome: Progressing Towards Goal     Problem: Pressure Injury - Risk of  Goal: *Prevention of pressure injury  Outcome: Progressing Towards Goal  Note: Pressure Injury Interventions:  Sensory Interventions: Assess changes in LOC, Float heels, Minimize linen layers    Moisture Interventions: Absorbent underpads, Maintain skin hydration (lotion/cream), Minimize layers, Moisture barrier, Offer toileting Q_hr    Activity Interventions: Pressure redistribution bed/mattress(bed type)    Mobility Interventions: HOB 30 degrees or less, Pressure redistribution bed/mattress (bed type)    Nutrition Interventions: Document food/fluid/supplement intake    Friction and Shear Interventions: HOB 30 degrees or less, Lift sheet, Minimize layers “You can access the Follow[employer] Patient Portal, offered by [facility], by registering with the following website: [email or web address]”

## 2021-09-10 NOTE — PROGRESS NOTES
Problem: Mobility Impaired (Adult and Pediatric)  Goal: *Acute Goals and Plan of Care (Insert Text)  Description: FUNCTIONAL STATUS PRIOR TO ADMISSION: Patient was modified independent using a rolling walker for functional mobility. HOME SUPPORT PRIOR TO ADMISSION: The patient lived with daughter but did not require assist.    Physical Therapy Goals  Initiated 8/30/2021  1. Patient will move from supine to sit and sit to supine , scoot up and down, and roll side to side in bed with independence within 7 day(s). 2.  Patient will transfer from bed to chair and chair to bed with modified independence using the least restrictive device within 7 day(s). 3.  Patient will perform sit to stand with modified independence within 7 day(s). 4.  Patient will ambulate with modified independence for 200 feet with the least restrictive device within 7 day(s). Outcome: Progressing Towards Goal   PHYSICAL THERAPY TREATMENT  Patient: Suraj Calderón (35 y.o. female)  Date: 9/10/2021  [facility]: [facility] [A41.9, R65.20] <principal problem not specified>       Precautions:    Chart, physical therapy assessment, plan of care and goals were reviewed. ASSESSMENT  Patient continues with skilled PT services and is slowly progressing towards goals. PT re attempting treatment this afternoon with patient awake and much more alert. Patient very verbal today, requesting coca cola repeatedly throughout session. Patient agreeable to sitting in bedside chair; however reports nausea and dizziness upon standing up MinAx2 with RW. Staff called to bring blood pressure monitor; PT instructs patient to perform ankle pumps, LAQ at bedside while awaiting monitor. Blood pressure monitor brought reveals high BP when sitting, 182/78 several minutes later. After 5 minutes, patient reports improvement in nausea and dizziness, willing to stand up and take a few steps to reposition in bed.  She ambulates 2 ft laterally with modAx2 before returning to supine minAx2. Pepsi provided with patient indicating satisfaction. Vitals:    09/10/21 1133 09/10/21 1500 09/10/21 1503   BP: 131/75 (!) 182/78 130/87   BP 1 Location: Right upper arm Right upper arm Right upper arm   BP Patient Position: At rest Sitting Supine   Pulse: 70     Temp: 97.8 °F (36.6 °C)     Resp: 16     Height:      Weight:      SpO2: 96%                Current Level of Function Impacting Discharge (mobility/balance): minAx2    Other factors to consider for discharge:          PLAN :  Patient continues to benefit from skilled intervention to address the above impairments. Continue treatment per established plan of care. to address goals. Recommendation for discharge: (in order for the patient to meet his/her long term goals)  Family pursuing hospice care at home    This discharge recommendation:  Has been made in collaboration with the attending provider and/or case management    IF patient discharges home will need the following DME: patient owns DME required for discharge       SUBJECTIVE:   Patient stated I want some coca. .. ...     OBJECTIVE DATA SUMMARY:   Patient received supine in bed and was agreeable to participate in PT session. Patient was cleared by nursing to participate in PT session. Critical Behavior:  Neurologic State: Confused, Drowsy, Eyes open to voice, Lethargic, Sleeping  Orientation Level: Disoriented X4  Cognition: Impaired decision making, Memory loss, Decreased attention/concentration, Decreased command following  Safety/Judgement: Decreased awareness of environment  Functional Mobility Training:  Bed Mobility:     Supine to Sit: Minimum assistance;Assist x1;Additional time  Sit to Supine: Minimum assistance;Assist x2           Transfers:  Sit to Stand: Minimum assistance;Contact guard assistance  Stand to Sit: Contact guard assistance                             Balance:  Sitting: Impaired; Without support  Sitting - Static: Fair (occasional)  Sitting - Dynamic: Fair (occasional)  Standing: Impaired; With support  Standing - Static: Fair;Constant support  Ambulation/Gait Training:  Distance (ft): 2 Feet (ft)  Assistive Device: Gait belt;Walker, rolling  Ambulation - Level of Assistance: Minimal assistance;Assist x2        Gait Abnormalities: Decreased step clearance        Base of Support: Narrowed     Speed/Rosa Maria: Pace decreased (<100 feet/min)  Step Length: Right shortened;Left shortened                      Therapeutic Exercises:   LAQ x 20  Ankle pumps x 20   Pain Rating:  Patient without complaints of pain    Activity Tolerance:   Fair, requires frequent rest breaks, and signs and symptoms of orthostatic hypotension    After treatment patient left in no apparent distress:   Supine in bed, Call bell within reach, Bed / chair alarm activated, and Side rails x 3    COMMUNICATION/COLLABORATION:   The patients plan of care was discussed with: Registered nurse, Case management, and Rehabilitation technician.      Nyle Ahumada PT, DPT   Time Calculation: 28 mins

## 2021-09-10 NOTE — PROGRESS NOTES
Daily Progress Note: 9/10/2021  Tulio Abraham MD    Assessment/Plan:   Severe sepsis / septic shock: 2/2 PNA. -Resolved  -CXR showed patchy bilateral airspace disease, repeat 8/31 was improving  -Check RVP/COVID-PCR. - Negative  -Completed cefepime and Doxy  -BC neg  -sputum culture couldn't be done, PNA workup (Legionella and Strep Pneumo urine ag, Mycoplasma IgM) all negative     Chronic obstructive pulmonary disease: no wheezing.   -Combivent PRN     Hypertension:   -Restarted Norvasc and metoprolol (8/28)  -Increased Norvasc to 10 q day (9/2)  -not taking medication reliably, did take PO meds yesterday for the first time in 6 days  -BP better after taking meds     Hypothyroid:   -check TSH. Continue LT4     CAD (coronary artery disease) / History of heart artery stent:   -cont DAPT, statin, taking intermittently     Diabetes: type 2, associated w/ CKD4.   -not great control, A1c 7.8, tighter control would be high risk, however  -On determir at home.   -Use NPH with SSI here     CKD (chronic kidney disease) stage 4, GFR 15-29 ml/min: stable. -recheck BMP in AM if she will cooperate     Acute metabolic encephalopathy 2/2 sepsis.   -Has dementia and is at risk for delirium  -Avoid benzos, opioids, anticholinergics.    -Verbally re-direct whenever possible.   -Avoid chemical restraints unless pt is a danger to self or others.  -Haldol as needed  -Wrist restraints as needed when family not present  -Home Seroquel restarted    Dementia  -she has had significant cognitive decline with this illness and hospitalization  -daughter was here at bedside all weekend, she was apprised of patient's condition including ongoing decline making her inappropriate for rehab  -Palliative Care following, niece (mPOA) reportedly unable to take care of patient in her home  -has taken meds 2 out of the last 8 days; she is not eating much and is drinking only small amounts  -CM, hospice and Palliative Care will need to work on alternate plans for discharge      Thrombocytopenia: chronic, stable. -Follow PLT     Code Status: DNR     Problem List:  Problem List as of 9/10/2021 Date Reviewed: 8/26/2021        Codes Class Noted - Resolved    Thrombocytopenia ([facility] ICD-10-CM: D69.6  ICD-9-CM: 287.5  8/27/2021 - Present        Chronic obstructive pulmonary disease (HCC) ICD-10-CM: J44.9  ICD-9-CM: 80  Unknown - Present        Hypertension ICD-10-CM: I10  ICD-9-CM: 401.9  Unknown - Present        Hypothyroid ICD-10-CM: E03.9  ICD-9-CM: 244.9  Unknown - Present        CAD (coronary artery disease) ICD-10-CM: I25.10  ICD-9-CM: 414.00  Unknown - Present        History of heart artery stent ICD-10-CM: Z95.5  ICD-9-CM: V45.82  Unknown - Present        Diabetes ([facility] ICD-10-CM: E11.9  ICD-9-CM: 250.00  Unknown - Present        CKD (chronic kidney disease) stage 4, GFR 15-29 ml/min ([facility] ICD-10-CM: N18.4  ICD-9-CM: 969. 4  Unknown - Present        Severe sepsis (HCC) ICD-10-CM: A41.9, R65.20  ICD-9-CM: 038.9, 995.92  Unknown - Present        Pneumonia ICD-10-CM: J18.9  ICD-9-CM: 668  Unknown - Present        Dementia ([facility] ICD-10-CM: F03.90  ICD-9-CM: 294.20  8/26/2021 - Present        Acute metabolic encephalopathy YGF-[id number]: G93.41  ICD-9-CM: 348.31  8/26/2021 - Present              Subjective:    79 y.o. female w/ hx of CAD, HTN, DM, CKD4, dementia presenting w/ fever, weakness. Started 1 day PTA, febrile at home, associated with AMS and lethargy per family. Pt also c/o chest pain and cough. ED workup showed 3/4 SIRS with bilateral PNA evident on CXR. Ms. Jennifer Zavala is admitted for further evaluation and management. (Dr Youngblood Silence)    8/27: She is confused this am. Not able to provide any history and family not present. Off pressors. BP stable. 8/28: Confused. Has been more agitated during the night requiring restraints. Change antibiotic to Doxy so that we could use Haldol. BP is higher so will restart meds.  Ask case management to see.     8/29: Daughter at bedside. She is less confused this am. A little more talkative. Family willing to look at rehab as she needs 24 hour care. Discussed with daughter that she should not be left alone. Her daughter lives out of town and works so her niece has been caring for her. 8/30:Sleeping but arouses. Nurses report she had a good night. WBC and Cr have improved. Afebrile. Case management following.     8/31: Pleasantly confused. Appetite has improved. Case management following for placement. Will continue IV antibiotics. Recheck CXR.     9/1: Family looking for SNF/Long term placement. Case management following. WBC has improved. CXR improving. Will continue IV antibiotics for now. 9/2: CM waiting to hear back from [facility] SNF/LTC for bed availability. Pt seen by PT/OT yesterday, did not tolerate well, max assist from laying to sit. Total care for ADL's. Patient on cefepime and doxy. WBC 11.9 (9/1). Patient refused labs this morning as well as her cefepime. Will try to get labs drawn this afternoon and administer cefepime if patient is more cooperative. Increased Norvasc due to HTN    9/3: More agitated and refusing blood draws. Poor appetite. IVF resumed. Family looking for placement. 9/4: Daughter at the bedside all night. Patient very confused overnight, refusing vitals and labs (only fingersticks for the past 72+ hours). Actually did really well with PT yesterday. 9/5: Lost IV acces overnight, not taking in much of anything by mouth, suspect she will continue to decline, will plan to consult Palliative on Tuesday. Daughter at bedside and agrees with this plan, she seems reasonable about her mother's decline; home with hospice is a definite option given her overall decline. 9/6: Continues to take in minimal PO, did take some ginger ale overnight. Does not have an IV.   Patient is minimally verbal, family not at the bedside this AM. She denies any complaints. :  Lethargic this AM, makes minimal verbal response, oriented to name only, not voicing any complaints    :  Daughter at bedside again this AM, patient did take pills yesterday, not drinking or eating any better. Patient isn't really making any verbal responses but not voicing any complaints. :  Remains confused and minimally verbal this AM, no family at bedside. Only complaint is \"I am cold. \"  Not eating or drinking much, did not take Seroquel last night. 9/10:  Too tired to participate with PT/OT yesterday, continues to take her pills, still not eating or drinking enough to sustain herself. She is more lethargic again today, she appears to be in the terminal phase of her life.       Review of Systems:   Review of systems not obtained due to patient factors. Objective:   Physical Exam:     Visit Vitals  /67 (BP 1 Location: Right arm, BP Patient Position: At rest)   Pulse 66   Temp 97.6 °F (36.4 °C)   Resp 17   Ht 5' 2.01\" (1.575 m)   Wt 62.1 kg (136 lb 14.5 oz)   SpO2 92%   BMI 25.03 kg/m²    O2 Flow Rate (L/min): 2 l/min O2 Device: None (Room air)  Temp (24hrs), Av.4 °F (36.3 °C), Min:97.1 °F (36.2 °C), Max:97.6 °F (36.4 °C)    1901 - 09/10 0700  In: 50 [P.O.:50]  Out: -    701 - 1900  In: 450 [P.O.:450]  Out: -   General:  Confused,  no distress, frail appearing. Head:  Normocephalic, without obvious abnormality, atraumatic. Eyes:  Conjunctivae/corneas clear   Nose: Nares normal. Septum midline. Mucosa normal. No drainage or sinus tenderness. Throat: Lips, mucosa, and tongue moist..   Neck: Supple, symmetrical, trachea midline, no adenopathy, thyroid: no enlargement/tenderness/nodules, no carotid bruit and no JVD. Back:   Symmetric, no curvature. ROM normal. No CVA tenderness. Lungs:   Clear to auscultation bilaterally. Chest wall:  No tenderness or deformity.    Heart:  Regular rate and rhythm, S1, S2 normal, no murmur, click, rub or gallop. Abdomen:   Soft, non-tender. Bowel sounds normal. No masses,  No organomegaly. Extremities: no cyanosis or edema. No calf tenderness or cords. Pulses: 2+ and symmetric all extremities. Skin: Skin color, texture, turgor normal. No rashes or lesions   Neurologic: CNII-XII intact. Alert and oriented X 1. Fine motor of hands and fingers normal.   equal.  No cogwheeling or rigidity. Gait not tested at this time. Sensation grossly normal to touch. Gross motor of extremities normal.       Data Review:    Port CXR 8/27/21   INDICATION: fever, cough  EXAM:  AP CHEST RADIOGRAPH  COMPARISON: December 4, 2018  FINDINGS:  AP portable view of the chest demonstrates right subclavian pacemaker. Heart  size is normal. Patchy airspace disease throughout the right lung and left lung  base. No pneumothorax. The osseous structures are unremarkable. IMPRESSION  Patchy bilateral airspace disease. Portable CXR 8/28/21  IMPRESSION  Improved aeration the right midlung and lower lung zones with  residual patchy airspace opacity. Small left pleural effusion. CXR 8/31/21  IMPRESSION  Improved aeration the right lung with minimal residual patchy  airspace opacity in the right midlung zone. .    Interpretation Summary- ECHO 8/31/21    · LV: Estimated LVEF is 55 - 60%. Normal cavity size and systolic function (ejection fraction normal). Upper normal wall thickness. Wall motion: normal. Mild (grade 1) left ventricular diastolic dysfunction. · Image quality for this study was technically difficult. · MV: Moderate mitral annular calcification. Mild mitral valve regurgitation is present. · LA: Mildly dilated left atrium. · AV: Mild aortic valve stenosis is present.   · RV: Pacing wire present       Recent Days:  Recent Labs     09/09/21  0646   WBC 9.4   HGB 13.6   HCT 42.1   *     Recent Labs     09/09/21  0646      K 3.9   *   CO2 25   *   BUN 56*   CREA 1.55*   CA 11.5*     No results for input(s): PH, PCO2, PO2, HCO3, FIO2 in the last 72 hours. 24 Hour Results:  Recent Results (from the past 24 hour(s))   CBC WITH AUTOMATED DIFF    Collection Time: 09/09/21  6:46 AM   Result Value Ref Range    WBC 9.4 3.6 - 11.0 K/uL    RBC 4.68 3.80 - 5.20 M/uL    HGB 13.6 11.5 - 16.0 g/dL    HCT 42.1 35.0 - 47.0 %    MCV 90.0 80.0 - 99.0 FL    MCH 29.1 26.0 - 34.0 PG    MCHC 32.3 30.0 - 36.5 g/dL    RDW 14.3 11.5 - 14.5 %    PLATELET 019 (L) 259 - 400 K/uL    NRBC 0.0 0  WBC    ABSOLUTE NRBC 0.00 0.00 - 0.01 K/uL    NEUTROPHILS 76 (H) 32 - 75 %    LYMPHOCYTES 15 12 - 49 %    MONOCYTES 7 5 - 13 %    EOSINOPHILS 2 0 - 7 %    BASOPHILS 0 0 - 1 %    IMMATURE GRANULOCYTES 0 0.0 - 0.5 %    ABS. NEUTROPHILS 7.1 1.8 - 8.0 K/UL    ABS. LYMPHOCYTES 1.4 0.8 - 3.5 K/UL    ABS. MONOCYTES 0.6 0.0 - 1.0 K/UL    ABS. EOSINOPHILS 0.2 0.0 - 0.4 K/UL    ABS. BASOPHILS 0.0 0.0 - 0.1 K/UL    ABS. IMM.  GRANS. 0.0 0.00 - 0.04 K/UL    DF AUTOMATED     METABOLIC PANEL, BASIC    Collection Time: 09/09/21  6:46 AM   Result Value Ref Range    Sodium 140 136 - 145 mmol/L    Potassium 3.9 3.5 - 5.1 mmol/L    Chloride 110 (H) 97 - 108 mmol/L    CO2 25 21 - 32 mmol/L    Anion gap 5 5 - 15 mmol/L    Glucose 181 (H) 65 - 100 mg/dL    BUN 56 (H) 6 - 20 MG/DL    Creatinine 1.55 (H) 0.55 - 1.02 MG/DL    BUN/Creatinine ratio 36 (H) 12 - 20      GFR est AA 39 (L) >60 ml/min/1.73m2    GFR est non-AA 32 (L) >60 ml/min/1.73m2    Calcium 11.5 (H) 8.5 - 10.1 MG/DL       Medications reviewed  Current Facility-Administered Medications   Medication Dose Route Frequency    metoprolol tartrate (LOPRESSOR) tablet 12.5 mg  12.5 mg Oral Q12H    QUEtiapine (SEROquel) tablet 50 mg  50 mg Oral BID    amLODIPine (NORVASC) tablet 10 mg  10 mg Oral DAILY    hydrALAZINE (APRESOLINE) 20 mg/mL injection 10 mg  10 mg IntraVENous Q6H PRN    ondansetron (ZOFRAN) injection 4 mg  4 mg IntraVENous Q6H PRN    aspirin chewable tablet 81 mg  81 mg Oral DAILY    clopidogreL (PLAVIX) tablet 75 mg  75 mg Oral DAILY    famotidine (PEPCID) tablet 20 mg  20 mg Oral QHS    ipratropium-albuterol (COMBIVENT RESPIMAT) 20 mcg-100 mcg inhalation spray  1 Puff Inhalation Q6H PRN    haloperidol lactate (HALDOL) injection 1 mg  1 mg IntraMUSCular Q6H PRN    sodium chloride (NS) flush 5-10 mL  5-10 mL IntraVENous PRN    sodium chloride (NS) flush 5-40 mL  5-40 mL IntraVENous Q8H    sodium chloride (NS) flush 5-40 mL  5-40 mL IntraVENous PRN    acetaminophen (TYLENOL) tablet 650 mg  650 mg Oral Q6H PRN    Or    acetaminophen (TYLENOL) suppository 650 mg  650 mg Rectal Q6H PRN    polyethylene glycol (MIRALAX) packet 17 g  17 g Oral DAILY PRN       Care Plan discussed with: Patient/Family and Nurse    Total time spent with patient: 30 minutes.     Sergio Hyde MD

## 2021-09-11 VITALS
BODY MASS INDEX: 25.19 KG/M2 | WEIGHT: 136.91 LBS | HEIGHT: 62 IN | TEMPERATURE: 98.1 F | DIASTOLIC BLOOD PRESSURE: 77 MMHG | OXYGEN SATURATION: 95 % | RESPIRATION RATE: 18 BRPM | HEART RATE: 81 BPM | SYSTOLIC BLOOD PRESSURE: 183 MMHG

## 2021-09-11 PROCEDURE — 74011250637 HC RX REV CODE- 250/637: Performed by: FAMILY MEDICINE

## 2021-09-11 PROCEDURE — 74011250637 HC RX REV CODE- 250/637: Performed by: INTERNAL MEDICINE

## 2021-09-11 PROCEDURE — 94760 N-INVAS EAR/PLS OXIMETRY 1: CPT

## 2021-09-11 RX ADMIN — Medication 10 ML: at 06:10

## 2021-09-11 RX ADMIN — QUETIAPINE FUMARATE 50 MG: 100 TABLET ORAL at 10:43

## 2021-09-11 RX ADMIN — ASPIRIN 81 MG: 81 TABLET, CHEWABLE ORAL at 10:43

## 2021-09-11 RX ADMIN — CLOPIDOGREL BISULFATE 75 MG: 75 TABLET, FILM COATED ORAL at 10:44

## 2021-09-11 RX ADMIN — METOPROLOL TARTRATE 12.5 MG: 25 TABLET, FILM COATED ORAL at 10:43

## 2021-09-11 RX ADMIN — AMLODIPINE BESYLATE 10 MG: 5 TABLET ORAL at 10:43

## 2021-09-11 NOTE — PROGRESS NOTES
Bedside and Verbal shift change report given to Patria Carr RN (oncoming nurse) by Hailee Chanel RN (offgoing nurse). Report included the following information SBAR, Kardex, Procedure Summary, Intake/Output, MAR, Accordion, Recent Results and Med Rec Status.

## 2021-09-11 NOTE — PROGRESS NOTES
I have reviewed discharge instructions with the patient. The patient not oriented and unable to verbalize understanding. IV removed. AVS sent with Pt who left via [facility].

## 2021-09-11 NOTE — PROGRESS NOTES
Daily Progress and Discharge Note: 9/11/2021  Deepa Phillip MD    Assessment/Plan:   Severe sepsis / septic shock: 2/2 PNA. -Resolved  -CXR showed patchy bilateral airspace disease, repeat 8/31 was improving  -Check RVP/COVID-PCR. - Negative  -Completed cefepime and Doxy  -BC neg  -sputum culture couldn't be done, PNA workup (Legionella and Strep Pneumo urine ag, Mycoplasma IgM) all negative     Chronic obstructive pulmonary disease: no wheezing.   -Combivent PRN     Hypertension: BPs labile  -Restarted Norvasc and metoprolol (8/28)  -Increased Norvasc to 10 q day (9/2)  -not taking medication reliably, did take PO meds yesterday for the first time in 6 days  -BP better after taking meds     Hypothyroid:   - Continue LT4     CAD (coronary artery disease) / History of heart artery stent:   -cont DAPT, statin, taking intermittently     Diabetes: type 2, associated w/ CKD4.   -not great control, A1c 7.8, tighter control would be high risk, however  -On determir at home.   -Use NPH with SSI here     CKD (chronic kidney disease) stage 4, GFR 15-29 ml/min: stable. -recheck BMP in AM if she will cooperate     Acute metabolic encephalopathy 2/2 sepsis.   -Has dementia and is at risk for delirium  -Avoid benzos, opioids, anticholinergics.    -Verbally re-direct whenever possible.   -Avoid chemical restraints unless pt is a danger to self or others.  -Haldol as needed  -Wrist restraints as needed when family not present  -Home Seroquel restarted    Dementia  -she has had significant cognitive decline with this illness and hospitalization  -daughter was here at bedside all weekend, she was apprised of patient's condition including ongoing decline making her inappropriate for rehab  -Palliative Care following, niece (mPOA) reportedly unable to take care of patient in her home  -has taken meds 2 out of the last 8 days; she is not eating much and is drinking only small amounts  -, hospice and Palliative Care will need to work on alternate plans for discharge      Thrombocytopenia: chronic, stable. -Follow PLT     Code Status: DNR  Dispo: home today with Hospice, all home meds to be discontinued     Problem List:  Problem List as of 9/11/2021 Date Reviewed: 8/26/2021        Codes Class Noted - Resolved    Thrombocytopenia ([facility] ICD-10-CM: D69.6  ICD-9-CM: 287.5  8/27/2021 - Present        Chronic obstructive pulmonary disease ([facility] ICD-10-CM: J44.9  ICD-9-CM: 80  Unknown - Present        Hypertension ICD-10-CM: I10  ICD-9-CM: 401.9  Unknown - Present        Hypothyroid ICD-10-CM: E03.9  ICD-9-CM: 244.9  Unknown - Present        CAD (coronary artery disease) ICD-10-CM: I25.10  ICD-9-CM: 414.00  Unknown - Present        History of heart artery stent ICD-10-CM: Z95.5  ICD-9-CM: V45.82  Unknown - Present        Diabetes ([facility] ICD-10-CM: E11.9  ICD-9-CM: 250.00  Unknown - Present        CKD (chronic kidney disease) stage 4, GFR 15-29 ml/min ([facility] ICD-10-CM: N18.4  ICD-9-CM: 433. 4  Unknown - Present        Severe sepsis (HCC) ICD-10-CM: A41.9, R65.20  ICD-9-CM: 038.9, 995.92  Unknown - Present        Pneumonia ICD-10-CM: J18.9  ICD-9-CM: 014  Unknown - Present        Dementia ([facility] ICD-10-CM: F03.90  ICD-9-CM: 294.20  8/26/2021 - Present        Acute metabolic encephalopathy NHY-59-LI: G93.41  ICD-9-CM: 348.31  8/26/2021 - Present              Subjective:    79 y.o. female w/ hx of CAD, HTN, DM, CKD4, dementia presenting w/ fever, weakness. Started 1 day PTA, febrile at home, associated with AMS and lethargy per family. Pt also c/o chest pain and cough. ED workup showed 3/4 SIRS with bilateral PNA evident on CXR. Ms. Corona Mitchell is admitted for further evaluation and management. (Dr Conrad Alarcon)    8/27: She is confused this am. Not able to provide any history and family not present. Off pressors. BP stable. 8/28: Confused. Has been more agitated during the night requiring restraints.  Change antibiotic to Doxy so that we could use Haldol. BP is higher so will restart meds. Ask case management to see.     8/29: Daughter at bedside. She is less confused this am. A little more talkative. Family willing to look at rehab as she needs 24 hour care. Discussed with daughter that she should not be left alone. Her daughter lives out of town and works so her niece has been caring for her. 8/30:Sleeping but arouses. Nurses report she had a good night. WBC and Cr have improved. Afebrile. Case management following.     8/31: Pleasantly confused. Appetite has improved. Case management following for placement. Will continue IV antibiotics. Recheck CXR.     9/1: Family looking for SNF/Long term placement. Case management following. WBC has improved. CXR improving. Will continue IV antibiotics for now. 9/2: CM waiting to hear back from [facility] SNF/[facility] for bed availability. Pt seen by PT/OT yesterday, did not tolerate well, max assist from laying to sit. Total care for ADL's. Patient on cefepime and doxy. WBC 11.9 (9/1). Patient refused labs this morning as well as her cefepime. Will try to get labs drawn this afternoon and administer cefepime if patient is more cooperative. Increased Norvasc due to HTN    9/3: More agitated and refusing blood draws. Poor appetite. IVF resumed. Family looking for placement. 9/4: Daughter at the bedside all night. Patient very confused overnight, refusing vitals and labs (only fingersticks for the past 72+ hours). Actually did really well with PT yesterday. 9/5: Lost IV acces overnight, not taking in much of anything by mouth, suspect she will continue to decline, will plan to consult Palliative on Tuesday. Daughter at bedside and agrees with this plan, she seems reasonable about her mother's decline; home with hospice is a definite option given her overall decline. 9/6: Continues to take in minimal PO, did take some ginger ale overnight. Does not have an IV.   Patient is minimally verbal, family not at the bedside this AM. She denies any complaints. :  Lethargic this AM, makes minimal verbal response, oriented to name only, not voicing any complaints    :  Daughter at bedside again this AM, patient did take pills yesterday, not drinking or eating any better. Patient isn't really making any verbal responses but not voicing any complaints. :  Remains confused and minimally verbal this AM, no family at bedside. Only complaint is \"I am cold. \"  Not eating or drinking much, did not take Seroquel last night. 9/10:  Too tired to participate with PT/OT yesterday, continues to take her pills, still not eating or drinking enough to sustain herself. She is more lethargic again today, she appears to be in the terminal phase of her life. : Pt too weak to participate with therapy yesterday. Not much PO intake. Plan is discharge home today to be admitted to [address] [place] [address]. Happy to be going home.         Review of Systems:   Review of systems not obtained due to patient factors. Objective:   Physical Exam:     Visit Vitals  BP (!) 148/66 (BP 1 Location: Left upper arm, BP Patient Position: At rest)   Pulse 81   Temp 98.1 °F (36.7 °C)   Resp 16   Ht 5' 2.01\" (1.575 m)   Wt 62.1 kg (136 lb 14.5 oz)   SpO2 95%   BMI 25.03 kg/m²    O2 Flow Rate (L/min): 2 l/min O2 Device: None (Room air)  Temp (24hrs), Av.9 °F (36.6 °C), Min:97.5 °F (36.4 °C), Max:98.1 °F (36.7 °C)    No intake/output data recorded.  1901 -  0700  In: 50 [P.O.:50]  Out: 0   General:  Pleasantly confused, no distress, frail appearing. Head:  Normocephalic, without obvious abnormality, atraumatic. Eyes:  Conjunctivae/corneas clear   Nose: Nares normal. Septum midline. Throat: Lips, mucosa, and tongue dry   Neck: Supple, symmetrical, trachea midline, no adenopathy, thyroid: no enlargement/tenderness/nodules, no carotid bruit and no JVD. Back:   Symmetric, no curvature.  ROM normal. No CVA tenderness. Lungs:   Clear to auscultation bilaterally. Chest wall:  No tenderness or deformity. Heart:  Regular rate and rhythm, S1, S2 normal, 4/6 holosystolic murmur, click, rub or gallop. Abdomen:   Soft, non-tender. Bowel sounds normal. No masses,  No organomegaly. Extremities: no cyanosis or edema. No calf tenderness or cords. Pulses: 2+ and symmetric all extremities. Skin: Skin color, texture, turgor normal. No rashes or lesions   Neurologic: CNII-XII intact. Alert and oriented X 1. Fine motor of hands and fingers normal.   equal. Gait not tested at this time. Sensation grossly normal to touch. Gross motor of extremities normal.       Data Review:    Port CXR 8/27/21   INDICATION: fever, cough  EXAM:  AP CHEST RADIOGRAPH  COMPARISON: December 4, 2018  FINDINGS:  AP portable view of the chest demonstrates right subclavian pacemaker. Heart  size is normal. Patchy airspace disease throughout the right lung and left lung  base. No pneumothorax. The osseous structures are unremarkable. IMPRESSION  Patchy bilateral airspace disease. Portable CXR 8/28/21  IMPRESSION  Improved aeration the right midlung and lower lung zones with  residual patchy airspace opacity. Small left pleural effusion. CXR 8/31/21  IMPRESSION  Improved aeration the right lung with minimal residual patchy  airspace opacity in the right midlung zone. .    Interpretation Summary- ECHO 8/31/21    · LV: Estimated LVEF is 55 - 60%. Normal cavity size and systolic function (ejection fraction normal). Upper normal wall thickness. Wall motion: normal. Mild (grade 1) left ventricular diastolic dysfunction. · Image quality for this study was technically difficult. · MV: Moderate mitral annular calcification. Mild mitral valve regurgitation is present. · LA: Mildly dilated left atrium. · AV: Mild aortic valve stenosis is present.   · RV: Pacing wire present       Recent Days:  Recent Labs     09/09/21  0670 WBC 9.4   HGB 13.6   HCT 42.1   *     Recent Labs     09/09/21  0646      K 3.9   *   CO2 25   *   BUN 56*   CREA 1.55*   CA 11.5*     No results for input(s): PH, PCO2, PO2, HCO3, FIO2 in the last 72 hours. 24 Hour Results:  No results found for this or any previous visit (from the past 24 hour(s)). Medications reviewed  Current Facility-Administered Medications   Medication Dose Route Frequency    metoprolol tartrate (LOPRESSOR) tablet 12.5 mg  12.5 mg Oral Q12H    QUEtiapine (SEROquel) tablet 50 mg  50 mg Oral BID    amLODIPine (NORVASC) tablet 10 mg  10 mg Oral DAILY    hydrALAZINE (APRESOLINE) 20 mg/mL injection 10 mg  10 mg IntraVENous Q6H PRN    ondansetron (ZOFRAN) injection 4 mg  4 mg IntraVENous Q6H PRN    aspirin chewable tablet 81 mg  81 mg Oral DAILY    clopidogreL (PLAVIX) tablet 75 mg  75 mg Oral DAILY    famotidine (PEPCID) tablet 20 mg  20 mg Oral QHS    ipratropium-albuterol (COMBIVENT RESPIMAT) 20 mcg-100 mcg inhalation spray  1 Puff Inhalation Q6H PRN    haloperidol lactate (HALDOL) injection 1 mg  1 mg IntraMUSCular Q6H PRN    sodium chloride (NS) flush 5-10 mL  5-10 mL IntraVENous PRN    sodium chloride (NS) flush 5-40 mL  5-40 mL IntraVENous Q8H    sodium chloride (NS) flush 5-40 mL  5-40 mL IntraVENous PRN    acetaminophen (TYLENOL) tablet 650 mg  650 mg Oral Q6H PRN    Or    acetaminophen (TYLENOL) suppository 650 mg  650 mg Rectal Q6H PRN    polyethylene glycol (MIRALAX) packet 17 g  17 g Oral DAILY PRN       Care Plan discussed with: Patient/Family and Nurse    Total time spent with patient: 40 minutes.     Patrick Javier MD

## 2021-09-11 NOTE — ROUTINE PROCESS
Bedside and Verbal shift change report given to KATHIA Lima (oncoming nurse) by Colonel Kennedy (offgoing nurse). Report included the following information SBAR and Kardex.

## 2021-09-11 NOTE — DISCHARGE INSTRUCTIONS
Patient Discharge Instructions    Isabella  / [id number] : 1940    Admitted 2021 Discharged: 2021 8:27 AM     ACUTE DIAGNOSES:  Severe sepsis ([facility] [A41.9, R65.20]    CHRONIC MEDICAL DIAGNOSES:  Problem List as of 2021 Date Reviewed: 2021        Codes Class Noted - Resolved    Thrombocytopenia ([facility] ICD-10-CM: D69.6  ICD-9-CM: 287.5  2021 - Present        Chronic obstructive pulmonary disease (HCC) ICD-10-CM: J44.9  ICD-9-CM: 80  Unknown - Present        Hypertension ICD-10-CM: I10  ICD-9-CM: 401.9  Unknown - Present        Hypothyroid ICD-10-CM: E03.9  ICD-9-CM: 244.9  Unknown - Present        CAD (coronary artery disease) ICD-10-CM: I25.10  ICD-9-CM: 414.00  Unknown - Present        History of heart artery stent ICD-10-CM: Z95.5  ICD-9-CM: V45.82  Unknown - Present        Diabetes ([facility] ICD-10-CM: E11.9  ICD-9-CM: 250.00  Unknown - Present        CKD (chronic kidney disease) stage 4, GFR 15-29 ml/min ([facility] ICD-10-CM: N18.4  ICD-9-CM: 017. 4  Unknown - Present        Severe sepsis (HCC) ICD-10-CM: A41.9, R65.20  ICD-9-CM: 038.9, 995.92  Unknown - Present        Pneumonia ICD-10-CM: J18.9  ICD-9-CM: 559  Unknown - Present        Dementia ([facility] ICD-10-CM: F03.90  ICD-9-CM: 294.20  2021 - Present        Acute metabolic encephalopathy MBP-50-AB: G93.41  ICD-9-CM: 348.31  2021 - Present              DISCHARGE MEDICATIONS:    · It is important that you take the medication exactly as they are prescribed. · Keep your medication in the bottles provided by the pharmacist and keep a list of the medication names, dosages, and times to be taken in your wallet. · Do not take other medications without consulting your doctor.        DIET:  Regular Diet    ACTIVITY: Activity as tolerated    ADDITIONAL INFORMATION: If you experience any of the following symptoms then please call your primary care physician or return to the emergency room if you cannot get hold of your doctor: Fever, chills, nausea, vomiting, diarrhea, change in mentation, falling, bleeding, shortness of breath. FOLLOW UP CARE:  Through the hospice company at home. Dr Inez Sotelo will continue to follow along with them. Information obtained by :  I understand that if any problems occur once I am at home I am to contact my physician. I understand and acknowledge receipt of the instructions indicated above.                                                                                                                                            Physician's or R.N.'s Signature                                                                  Date/Time                                                                                                                                              Patient or Representative Signature                                                          Date/Time

## 2021-09-11 NOTE — DISCHARGE SUMMARY
Physician Discharge Summary     Patient ID:    Giuseppe Nicole  [id number]  79 y.o.  1940  Qasim Link MD    Admit date: 8/26/2021    Discharge date and time: 9/11/2021    Admission Diagnoses: Severe sepsis ([facility] [A41.9, R65.20]    Chronic Diagnoses:    Problem List as of 9/11/2021 Date Reviewed: 8/26/2021        Codes Class Noted - Resolved    Thrombocytopenia ([facility] ICD-10-CM: D69.6  ICD-9-CM: 287.5  8/27/2021 - Present        Chronic obstructive pulmonary disease (HCC) ICD-10-CM: J44.9  ICD-9-CM: 496  Unknown - Present        Hypertension ICD-10-CM: I10  ICD-9-CM: 401.9  Unknown - Present        Hypothyroid ICD-10-CM: E03.9  ICD-9-CM: 244.9  Unknown - Present        CAD (coronary artery disease) ICD-10-CM: I25.10  ICD-9-CM: 414.00  Unknown - Present        History of heart artery stent ICD-10-CM: Z95.5  ICD-9-CM: V45.82  Unknown - Present        Diabetes ([facility] ICD-10-CM: E11.9  ICD-9-CM: 250.00  Unknown - Present        CKD (chronic kidney disease) stage 4, GFR 15-29 ml/min ([facility] ICD-10-CM: N18.4  ICD-9-CM: 462. 4  Unknown - Present        Severe sepsis (HCC) ICD-10-CM: A41.9, R65.20  ICD-9-CM: 038.9, 995.92  Unknown - Present        Pneumonia ICD-10-CM: J18.9  ICD-9-CM: 991  Unknown - Present        Dementia ([facility] ICD-10-CM: F03.90  ICD-9-CM: 294.20  8/26/2021 - Present        Acute metabolic encephalopathy WGM-[id number]: G93.41  ICD-9-CM: 348.31  8/26/2021 - Present              Discharge Medications:   Current Discharge Medication List      CONTINUE these medications which have NOT CHANGED    Details   !! gabapentin (NEURONTIN) 300 mg capsule Take 900 mg by mouth Every morning. !! gabapentin (NEURONTIN) 300 mg capsule Take 600 mg by mouth nightly. insulin detemir U-100 (LEVEMIR FLEXTOUCH) 100 unit/mL (3 mL) inpn 30 Units by SubCUTAneous route two (2) times a day.       insulin aspart U-100 (NovoLOG Flexpen U-100 Insulin) 100 unit/mL (3 mL) inpn by SubCUTAneous route Before breakfast, lunch, and dinner. Sliding scale      metoprolol succinate (TOPROL-XL) 25 mg XL tablet Take 25 mg by mouth daily. famotidine (PEPCID) 40 mg tablet Take 40 mg by mouth nightly. escitalopram oxalate (LEXAPRO) 20 mg tablet Take 20 mg by mouth daily. albuterol (ACCUNEB) 1.25 mg/3 mL nebu Take 1.25 mg by inhalation every eight (8) hours as needed for Wheezing or Shortness of Breath. amLODIPine (NORVASC) 5 mg tablet Take 5 mg by mouth daily. nitroglycerin (NITRODUR) 0.4 mg/hr 1 Patch by TransDERmal route daily. QUEtiapine (SEROquel) 100 mg tablet Take 100 mg by mouth two (2) times a day. levothyroxine (SYNTHROID) 50 mcg tablet Take 50 mcg by mouth Daily (before breakfast). Refills: 0      sodium bicarbonate 325 mg tablet Take 325 mg by mouth two (2) times a day. Refills: 0      montelukast (SINGULAIR) 10 mg tablet Take 10 mg by mouth daily. Refills: 1      clopidogrel (PLAVIX) 75 mg tablet Take 75 mg by mouth daily. Refills: 0      aspirin 81 mg chewable tablet Take 81 mg by mouth daily. Refills: 0      albuterol (PROVENTIL HFA, VENTOLIN HFA, PROAIR HFA) 90 mcg/actuation inhaler Take 2 Puffs by inhalation every four (4) hours as needed for Wheezing. !! - Potential duplicate medications found. Please discuss with provider. STOP taking these medications       ergocalciferol (Vitamin D2) 1,250 mcg (50,000 unit) capsule Comments:   Reason for Stopping:         cetirizine (ZYRTEC) 10 mg tablet Comments:   Reason for Stopping: Follow up Care:    1. [facility]    Diet:  Regular Diet    Disposition:  Home.     Advanced Directive:    Discharge Exam:  [See today's progress note.]    CONSULTATIONS: Palliative Care    Significant Diagnostic Studies:   Recent Labs     09/09/21  0646   WBC 9.4   HGB 13.6   HCT 42.1   *     Recent Labs     09/09/21  0646      K 3.9   *   CO2 25   BUN 56*   CREA 1.55*   *   CA 11.5*     No results for input(s): AP, TBIL, TP, ALB, GLOB, GGT, AML, LPSE in the last 72 hours. No lab exists for component: SGOT, GPT, AMYP, HLPSE  No results for input(s): INR, PTP, APTT, INREXT in the last 72 hours. No results for input(s): FE, TIBC, PSAT, FERR in the last 72 hours. No results for input(s): PH, PCO2, PO2 in the last 72 hours. No results for input(s): CPK, CKMB in the last 72 hours. No lab exists for component: TROPONINI  No components found for: [place]  Lab Results   Component Value Date/Time    TSH 2.91 08/28/2021 06:06 AM       HOSPITAL COURSE & TREATMENT RENDERED:   1. Assessment/Plan:   Severe sepsis / septic shock: 2/2 PNA. -Resolved  -CXR showed patchy bilateral airspace disease, repeat 8/31 was improving  -Check RVP/COVID-PCR. - Negative  -Completed cefepime and Doxy  -BC neg  -sputum culture couldn't be done, PNA workup (Legionella and Strep Pneumo urine ag, Mycoplasma IgM) all negative     Chronic obstructive pulmonary disease: no wheezing.   -Combivent PRN     Hypertension: BPs labile  -Restarted Norvasc and metoprolol (8/28)  -Increased Norvasc to 10 q day (9/2)  -not taking medication reliably, did take PO meds yesterday for the first time in 6 days  -BP better after taking meds     Hypothyroid:   - Continue LT4     CAD (coronary artery disease) / History of heart artery stent:   -cont DAPT, statin, taking intermittently     Diabetes: type 2, associated w/ CKD4.   -not great control, A1c 7.8, tighter control would be high risk, however  -On determir at home.   -Use NPH with SSI here     CKD (chronic kidney disease) stage 4, GFR 15-29 ml/min: stable. -recheck BMP in AM if she will cooperate     Acute metabolic encephalopathy 2/2 sepsis.   -Has dementia and is at risk for delirium  -Avoid benzos, opioids, anticholinergics.    -Verbally re-direct whenever possible.   -Avoid chemical restraints unless pt is a danger to self or others.  -Haldol as needed  -Wrist restraints as needed when family not present  -Home Seroquel restarted    Dementia  -she has had significant cognitive decline with this illness and hospitalization  -daughter was here at bedside all weekend, she was apprised of patient's condition including ongoing decline making her inappropriate for rehab  -Palliative Care following, niece (Irene) reportedly unable to take care of patient in her home  -has taken meds 2 out of the last 8 days; she is not eating much and is drinking only small amounts  -CM, hospice and Palliative Care will need to work on alternate plans for discharge      Thrombocytopenia: chronic, stable. -Follow PLT     Code Status: DNR  Dispo: home today with Hospice, all home meds to be discontinued     Problem List:  Problem List as of 9/11/2021 Date Reviewed: 8/26/2021        Codes Class Noted - Resolved    Thrombocytopenia ([facility] ICD-10-CM: D69.6  ICD-9-CM: 287.5  8/27/2021 - Present        Chronic obstructive pulmonary disease ([facility] ICD-10-CM: J44.9  ICD-9-CM: 80  Unknown - Present        Hypertension ICD-10-CM: I10  ICD-9-CM: 401.9  Unknown - Present        Hypothyroid ICD-10-CM: E03.9  ICD-9-CM: 244.9  Unknown - Present        CAD (coronary artery disease) ICD-10-CM: I25.10  ICD-9-CM: 414.00  Unknown - Present        History of heart artery stent ICD-10-CM: Z95.5  ICD-9-CM: V45.82  Unknown - Present        Diabetes ([facility] ICD-10-CM: E11.9  ICD-9-CM: 250.00  Unknown - Present        CKD (chronic kidney disease) stage 4, GFR 15-29 ml/min ([facility] ICD-10-CM: N18.4  ICD-9-CM: 462. 4  Unknown - Present        Severe sepsis (HCC) ICD-10-CM: A41.9, R65.20  ICD-9-CM: 038.9, 995.92  Unknown - Present        Pneumonia ICD-10-CM: J18.9  ICD-9-CM: 199  Unknown - Present        Dementia ([facility] ICD-10-CM: F03.90  ICD-9-CM: 294.20  8/26/2021 - Present        Acute metabolic encephalopathy FIJ-69-YE: G93.41  ICD-9-CM: 348.31  8/26/2021 - Present              Subjective:    79 y.o. female w/ hx of CAD, HTN, DM, CKD4, dementia presenting w/ fever, weakness. Started 1 day PTA, febrile at home, associated with AMS and lethargy per family. Pt also c/o chest pain and cough. ED workup showed 3/4 SIRS with bilateral PNA evident on CXR. Ms. Dwayne Castro is admitted for further evaluation and management. (Dr Hercules Section)    8/27: She is confused this am. Not able to provide any history and family not present. Off pressors. BP stable. 8/28: Confused. Has been more agitated during the night requiring restraints. Change antibiotic to Doxy so that we could use Haldol. BP is higher so will restart meds. Ask case management to see.     8/29: Daughter at bedside. She is less confused this am. A little more talkative. Family willing to look at rehab as she needs 24 hour care. Discussed with daughter that she should not be left alone. Her daughter lives out of town and works so her niece has been caring for her. 8/30:Sleeping but arouses. Nurses report she had a good night. WBC and Cr have improved. Afebrile. Case management following.     8/31: Pleasantly confused. Appetite has improved. Case management following for placement. Will continue IV antibiotics. Recheck CXR.     9/1: Family looking for SNF/Long term placement. Case management following. WBC has improved. CXR improving. Will continue IV antibiotics for now. 9/2: CM waiting to hear back from [facility] SNF/LTC for bed availability. Pt seen by PT/OT yesterday, did not tolerate well, max assist from laying to sit. Total care for ADL's. Patient on cefepime and doxy. WBC 11.9 (9/1). Patient refused labs this morning as well as her cefepime. Will try to get labs drawn this afternoon and administer cefepime if patient is more cooperative. Increased Norvasc due to HTN    9/3: More agitated and refusing blood draws. Poor appetite. IVF resumed. Family looking for placement. 9/4: Daughter at the bedside all night.   Patient very confused overnight, refusing vitals and labs (only fingersticks for the past 72+ hours). Actually did really well with PT yesterday. 9/5: Lost IV acces overnight, not taking in much of anything by mouth, suspect she will continue to decline, will plan to consult Palliative on Tuesday. Daughter at bedside and agrees with this plan, she seems reasonable about her mother's decline; home with hospice is a definite option given her overall decline. 9/6: Continues to take in minimal PO, did take some ginger ale overnight. Does not have an IV. Patient is minimally verbal, family not at the bedside this AM. She denies any complaints. 9/7:  Lethargic this AM, makes minimal verbal response, oriented to name only, not voicing any complaints    9/8:  Daughter at bedside again this AM, patient did take pills yesterday, not drinking or eating any better. Patient isn't really making any verbal responses but not voicing any complaints. 9/9:  Remains confused and minimally verbal this AM, no family at bedside. Only complaint is \"I am cold. \"  Not eating or drinking much, did not take Seroquel last night. 9/10:  Too tired to participate with PT/OT yesterday, continues to take her pills, still not eating or drinking enough to sustain herself. She is more lethargic again today, she appears to be in the terminal phase of her life. 9/11: Pt too weak to participate with therapy yesterday. Not much PO intake. Plan is discharge home today to be admitted to [address] [place] [address]. Happy to be going home.         Signed:  Jaelyn Potter MD  9/11/2021  8:28 AM .

## 2021-09-11 NOTE — PROGRESS NOTES
Problem: Falls - Risk of  Goal: *Absence of Falls  Outcome: Progressing Towards Goal  Note: Fall Risk Interventions:  Mobility Interventions: Bed/chair exit alarm, Patient to call before getting OOB    Mentation Interventions: Bed/chair exit alarm, Door open when patient unattended    Medication Interventions: Bed/chair exit alarm, Patient to call before getting OOB, Teach patient to arise slowly    Elimination Interventions: Bed/chair exit alarm, Call light in reach, Patient to call for help with toileting needs              Problem: Patient Education: Go to Patient Education Activity  Goal: Patient/Family Education  Outcome: Progressing Towards Goal     Problem: Pressure Injury - Risk of  Goal: *Prevention of pressure injury  Outcome: Progressing Towards Goal  Note: Pressure Injury Interventions:  Sensory Interventions: Assess changes in LOC, Float heels    Moisture Interventions: Absorbent underpads, Minimize layers, Moisture barrier, Offer toileting Q_hr    Activity Interventions: Pressure redistribution bed/mattress(bed type)    Mobility Interventions: HOB 30 degrees or less    Nutrition Interventions: Document food/fluid/supplement intake    Friction and Shear Interventions: HOB 30 degrees or less, Minimize layers                Problem: Pressure Injury - Risk of  Goal: *Prevention of pressure injury  Outcome: Progressing Towards Goal  Note: Pressure Injury Interventions:  Sensory Interventions: Assess changes in LOC, Float heels    Moisture Interventions: Absorbent underpads, Minimize layers, Moisture barrier, Offer toileting Q_hr    Activity Interventions: Pressure redistribution bed/mattress(bed type)    Mobility Interventions: HOB 30 degrees or less    Nutrition Interventions: Document food/fluid/supplement intake    Friction and Shear Interventions: HOB 30 degrees or less, Minimize layers